# Patient Record
Sex: FEMALE | Race: WHITE | Employment: PART TIME | URBAN - METROPOLITAN AREA
[De-identification: names, ages, dates, MRNs, and addresses within clinical notes are randomized per-mention and may not be internally consistent; named-entity substitution may affect disease eponyms.]

---

## 2017-01-05 ENCOUNTER — OFFICE VISIT (OUTPATIENT)
Dept: FAMILY MEDICINE CLINIC | Age: 62
End: 2017-01-05

## 2017-01-05 VITALS
RESPIRATION RATE: 18 BRPM | HEART RATE: 71 BPM | SYSTOLIC BLOOD PRESSURE: 136 MMHG | TEMPERATURE: 98.2 F | BODY MASS INDEX: 43.41 KG/M2 | WEIGHT: 245 LBS | DIASTOLIC BLOOD PRESSURE: 82 MMHG | OXYGEN SATURATION: 97 % | HEIGHT: 63 IN

## 2017-01-05 DIAGNOSIS — J01.00 ACUTE NON-RECURRENT MAXILLARY SINUSITIS: Primary | ICD-10-CM

## 2017-01-05 DIAGNOSIS — M77.12 LATERAL EPICONDYLITIS OF LEFT ELBOW: ICD-10-CM

## 2017-01-05 PROCEDURE — 20605 DRAIN/INJ JOINT/BURSA W/O US: CPT | Performed by: FAMILY MEDICINE

## 2017-01-05 PROCEDURE — 99213 OFFICE O/P EST LOW 20 MIN: CPT | Performed by: FAMILY MEDICINE

## 2017-01-05 RX ORDER — METHYLPREDNISOLONE ACETATE 40 MG/ML
40 INJECTION, SUSPENSION INTRA-ARTICULAR; INTRALESIONAL; INTRAMUSCULAR; SOFT TISSUE ONCE
Status: COMPLETED | OUTPATIENT
Start: 2017-01-05 | End: 2017-01-05

## 2017-01-05 RX ORDER — CEFDINIR 300 MG/1
300 CAPSULE ORAL 2 TIMES DAILY
Qty: 20 CAPSULE | Refills: 0 | Status: SHIPPED | OUTPATIENT
Start: 2017-01-05 | End: 2017-01-15

## 2017-01-05 RX ADMIN — METHYLPREDNISOLONE ACETATE 40 MG: 40 INJECTION, SUSPENSION INTRA-ARTICULAR; INTRALESIONAL; INTRAMUSCULAR; SOFT TISSUE at 16:15

## 2017-01-05 ASSESSMENT — ENCOUNTER SYMPTOMS
GASTROINTESTINAL NEGATIVE: 1
RESPIRATORY NEGATIVE: 1
CHEST TIGHTNESS: 0
RHINORRHEA: 0
EYES NEGATIVE: 1
COUGH: 0

## 2017-01-16 DIAGNOSIS — N32.81 OVERACTIVE BLADDER: ICD-10-CM

## 2017-01-16 RX ORDER — SOLIFENACIN SUCCINATE 10 MG/1
TABLET, FILM COATED ORAL
Qty: 30 TABLET | Refills: 0 | Status: SHIPPED | OUTPATIENT
Start: 2017-01-16 | End: 2018-04-11 | Stop reason: SDUPTHER

## 2017-02-08 ENCOUNTER — OFFICE VISIT (OUTPATIENT)
Dept: FAMILY MEDICINE CLINIC | Age: 62
End: 2017-02-08

## 2017-02-08 VITALS
DIASTOLIC BLOOD PRESSURE: 80 MMHG | SYSTOLIC BLOOD PRESSURE: 132 MMHG | HEART RATE: 66 BPM | WEIGHT: 247 LBS | OXYGEN SATURATION: 97 % | HEIGHT: 63 IN | BODY MASS INDEX: 43.77 KG/M2 | RESPIRATION RATE: 16 BRPM | TEMPERATURE: 97.6 F

## 2017-02-08 DIAGNOSIS — J01.00 ACUTE NON-RECURRENT MAXILLARY SINUSITIS: Primary | ICD-10-CM

## 2017-02-08 PROCEDURE — 3017F COLORECTAL CA SCREEN DOC REV: CPT | Performed by: FAMILY MEDICINE

## 2017-02-08 PROCEDURE — 1036F TOBACCO NON-USER: CPT | Performed by: FAMILY MEDICINE

## 2017-02-08 PROCEDURE — G8419 CALC BMI OUT NRM PARAM NOF/U: HCPCS | Performed by: FAMILY MEDICINE

## 2017-02-08 PROCEDURE — G8427 DOCREV CUR MEDS BY ELIG CLIN: HCPCS | Performed by: FAMILY MEDICINE

## 2017-02-08 PROCEDURE — 3014F SCREEN MAMMO DOC REV: CPT | Performed by: FAMILY MEDICINE

## 2017-02-08 PROCEDURE — 99213 OFFICE O/P EST LOW 20 MIN: CPT | Performed by: FAMILY MEDICINE

## 2017-02-08 PROCEDURE — G8484 FLU IMMUNIZE NO ADMIN: HCPCS | Performed by: FAMILY MEDICINE

## 2017-02-08 RX ORDER — LEVOFLOXACIN 500 MG/1
500 TABLET, FILM COATED ORAL DAILY
Qty: 14 TABLET | Refills: 0 | Status: SHIPPED | OUTPATIENT
Start: 2017-02-08 | End: 2017-02-22

## 2017-02-08 ASSESSMENT — ENCOUNTER SYMPTOMS
EYES NEGATIVE: 1
SINUS PRESSURE: 1
CHEST TIGHTNESS: 0
RHINORRHEA: 1
SORE THROAT: 1
GASTROINTESTINAL NEGATIVE: 1
COUGH: 1

## 2017-03-15 RX ORDER — TRAZODONE HYDROCHLORIDE 100 MG/1
100 TABLET ORAL NIGHTLY
Qty: 30 TABLET | Refills: 3 | Status: SHIPPED | OUTPATIENT
Start: 2017-03-15 | End: 2017-09-05 | Stop reason: SDUPTHER

## 2017-04-19 RX ORDER — ESCITALOPRAM OXALATE 20 MG/1
TABLET ORAL
Qty: 30 TABLET | Refills: 2 | Status: SHIPPED | OUTPATIENT
Start: 2017-04-19 | End: 2017-04-27

## 2017-04-25 ENCOUNTER — TELEPHONE (OUTPATIENT)
Dept: FAMILY MEDICINE CLINIC | Age: 62
End: 2017-04-25

## 2017-04-26 ENCOUNTER — TELEPHONE (OUTPATIENT)
Dept: FAMILY MEDICINE CLINIC | Age: 62
End: 2017-04-26

## 2017-04-27 RX ORDER — ESCITALOPRAM OXALATE 20 MG/1
20 TABLET ORAL DAILY
Qty: 30 TABLET | Refills: 0 | Status: SHIPPED | OUTPATIENT
Start: 2017-04-27 | End: 2017-06-27 | Stop reason: SDUPTHER

## 2017-04-27 RX ORDER — ESCITALOPRAM OXALATE 10 MG/1
10 TABLET ORAL DAILY
Qty: 30 TABLET | Refills: 3 | Status: SHIPPED | OUTPATIENT
Start: 2017-04-27 | End: 2017-06-27 | Stop reason: DRUGHIGH

## 2017-05-08 ENCOUNTER — OFFICE VISIT (OUTPATIENT)
Dept: FAMILY MEDICINE CLINIC | Age: 62
End: 2017-05-08

## 2017-05-08 VITALS
DIASTOLIC BLOOD PRESSURE: 78 MMHG | TEMPERATURE: 98.1 F | RESPIRATION RATE: 18 BRPM | HEIGHT: 63 IN | BODY MASS INDEX: 43.59 KG/M2 | OXYGEN SATURATION: 98 % | SYSTOLIC BLOOD PRESSURE: 132 MMHG | WEIGHT: 246 LBS | HEART RATE: 76 BPM

## 2017-05-08 DIAGNOSIS — M77.12 LATERAL EPICONDYLITIS OF LEFT ELBOW: Primary | ICD-10-CM

## 2017-05-08 PROCEDURE — 3014F SCREEN MAMMO DOC REV: CPT | Performed by: FAMILY MEDICINE

## 2017-05-08 PROCEDURE — 20605 DRAIN/INJ JOINT/BURSA W/O US: CPT | Performed by: FAMILY MEDICINE

## 2017-05-08 PROCEDURE — 3017F COLORECTAL CA SCREEN DOC REV: CPT | Performed by: FAMILY MEDICINE

## 2017-05-08 PROCEDURE — G8427 DOCREV CUR MEDS BY ELIG CLIN: HCPCS | Performed by: FAMILY MEDICINE

## 2017-05-08 PROCEDURE — 99213 OFFICE O/P EST LOW 20 MIN: CPT | Performed by: FAMILY MEDICINE

## 2017-05-08 PROCEDURE — 1036F TOBACCO NON-USER: CPT | Performed by: FAMILY MEDICINE

## 2017-05-08 PROCEDURE — G8417 CALC BMI ABV UP PARAM F/U: HCPCS | Performed by: FAMILY MEDICINE

## 2017-05-08 RX ORDER — METHYLPREDNISOLONE ACETATE 40 MG/ML
40 INJECTION, SUSPENSION INTRA-ARTICULAR; INTRALESIONAL; INTRAMUSCULAR; SOFT TISSUE ONCE
Status: COMPLETED | OUTPATIENT
Start: 2017-05-08 | End: 2017-05-08

## 2017-05-08 RX ADMIN — METHYLPREDNISOLONE ACETATE 40 MG: 40 INJECTION, SUSPENSION INTRA-ARTICULAR; INTRALESIONAL; INTRAMUSCULAR; SOFT TISSUE at 17:04

## 2017-05-08 ASSESSMENT — ENCOUNTER SYMPTOMS
EYES NEGATIVE: 1
RHINORRHEA: 0
COUGH: 0
RESPIRATORY NEGATIVE: 1
CHEST TIGHTNESS: 0
GASTROINTESTINAL NEGATIVE: 1

## 2017-06-27 RX ORDER — ESCITALOPRAM OXALATE 20 MG/1
20 TABLET ORAL DAILY
Qty: 30 TABLET | Refills: 3 | Status: SHIPPED | OUTPATIENT
Start: 2017-06-27 | End: 2017-12-20 | Stop reason: SDUPTHER

## 2017-08-22 DIAGNOSIS — R30.9 PAIN WITH URINATION: ICD-10-CM

## 2017-08-24 LAB
ORGANISM: ABNORMAL
URINE CULTURE, ROUTINE: ABNORMAL

## 2017-09-05 RX ORDER — TRAZODONE HYDROCHLORIDE 100 MG/1
100 TABLET ORAL NIGHTLY
Qty: 30 TABLET | Refills: 3 | Status: SHIPPED | OUTPATIENT
Start: 2017-09-05 | End: 2018-12-03 | Stop reason: SDUPTHER

## 2017-12-21 RX ORDER — ESCITALOPRAM OXALATE 20 MG/1
TABLET ORAL
Qty: 30 TABLET | Refills: 2 | Status: SHIPPED | OUTPATIENT
Start: 2017-12-21 | End: 2018-06-04 | Stop reason: SDUPTHER

## 2018-04-11 DIAGNOSIS — N32.81 OVERACTIVE BLADDER: ICD-10-CM

## 2018-04-11 RX ORDER — SOLIFENACIN SUCCINATE 10 MG/1
TABLET, FILM COATED ORAL
Qty: 30 TABLET | Refills: 0 | Status: SHIPPED | OUTPATIENT
Start: 2018-04-11 | End: 2019-11-05 | Stop reason: SDUPTHER

## 2018-08-22 RX ORDER — ESCITALOPRAM OXALATE 20 MG/1
TABLET ORAL
Qty: 30 TABLET | Refills: 1 | Status: SHIPPED | OUTPATIENT
Start: 2018-08-22 | End: 2018-12-03 | Stop reason: SDUPTHER

## 2018-11-26 ENCOUNTER — OFFICE VISIT (OUTPATIENT)
Dept: FAMILY MEDICINE CLINIC | Age: 63
End: 2018-11-26
Payer: COMMERCIAL

## 2018-11-26 VITALS
WEIGHT: 231 LBS | TEMPERATURE: 96.9 F | HEART RATE: 59 BPM | HEIGHT: 63 IN | BODY MASS INDEX: 40.93 KG/M2 | DIASTOLIC BLOOD PRESSURE: 72 MMHG | SYSTOLIC BLOOD PRESSURE: 112 MMHG | OXYGEN SATURATION: 96 %

## 2018-11-26 DIAGNOSIS — R51.9 ACUTE NONINTRACTABLE HEADACHE, UNSPECIFIED HEADACHE TYPE: ICD-10-CM

## 2018-11-26 DIAGNOSIS — J01.40 ACUTE NON-RECURRENT PANSINUSITIS: Primary | ICD-10-CM

## 2018-11-26 PROCEDURE — G8484 FLU IMMUNIZE NO ADMIN: HCPCS | Performed by: NURSE PRACTITIONER

## 2018-11-26 PROCEDURE — G8417 CALC BMI ABV UP PARAM F/U: HCPCS | Performed by: NURSE PRACTITIONER

## 2018-11-26 PROCEDURE — 99213 OFFICE O/P EST LOW 20 MIN: CPT | Performed by: NURSE PRACTITIONER

## 2018-11-26 PROCEDURE — 3017F COLORECTAL CA SCREEN DOC REV: CPT | Performed by: NURSE PRACTITIONER

## 2018-11-26 PROCEDURE — 1036F TOBACCO NON-USER: CPT | Performed by: NURSE PRACTITIONER

## 2018-11-26 PROCEDURE — G8427 DOCREV CUR MEDS BY ELIG CLIN: HCPCS | Performed by: NURSE PRACTITIONER

## 2018-11-26 PROCEDURE — 96372 THER/PROPH/DIAG INJ SC/IM: CPT | Performed by: NURSE PRACTITIONER

## 2018-11-26 RX ORDER — KETOROLAC TROMETHAMINE 30 MG/ML
60 INJECTION, SOLUTION INTRAMUSCULAR; INTRAVENOUS ONCE
Status: COMPLETED | OUTPATIENT
Start: 2018-11-26 | End: 2018-11-26

## 2018-11-26 RX ORDER — CEFDINIR 300 MG/1
600 CAPSULE ORAL DAILY
Qty: 20 CAPSULE | Refills: 0 | Status: SHIPPED | OUTPATIENT
Start: 2018-11-26 | End: 2018-12-06

## 2018-11-26 RX ADMIN — KETOROLAC TROMETHAMINE 60 MG: 30 INJECTION, SOLUTION INTRAMUSCULAR; INTRAVENOUS at 16:18

## 2018-11-26 ASSESSMENT — ENCOUNTER SYMPTOMS
PHOTOPHOBIA: 0
EYE REDNESS: 0
RHINORRHEA: 1
EYE ITCHING: 0
CHEST TIGHTNESS: 0
DIARRHEA: 0
COUGH: 1
EYE PAIN: 0
NAUSEA: 0
VOMITING: 0
STRIDOR: 0
WHEEZING: 0
TROUBLE SWALLOWING: 0
FACIAL SWELLING: 0
SORE THROAT: 0
SINUS PAIN: 1
SINUS PRESSURE: 1
SHORTNESS OF BREATH: 0
CHOKING: 0
EYE DISCHARGE: 0
HOARSE VOICE: 0
SWOLLEN GLANDS: 0

## 2018-11-26 ASSESSMENT — PATIENT HEALTH QUESTIONNAIRE - PHQ9
1. LITTLE INTEREST OR PLEASURE IN DOING THINGS: 0
SUM OF ALL RESPONSES TO PHQ9 QUESTIONS 1 & 2: 0
SUM OF ALL RESPONSES TO PHQ QUESTIONS 1-9: 0
2. FEELING DOWN, DEPRESSED OR HOPELESS: 0
SUM OF ALL RESPONSES TO PHQ QUESTIONS 1-9: 0

## 2018-11-26 NOTE — PROGRESS NOTES
Expresses understanding and desires to proceed with treatment plan. Discussed signs and symptoms which require immediate follow-up in ED/call to 911. Understanding verbalized. I have reviewed and updated the electronic medical record.     VANESA Fatima NP

## 2018-12-03 ENCOUNTER — OFFICE VISIT (OUTPATIENT)
Dept: FAMILY MEDICINE CLINIC | Age: 63
End: 2018-12-03
Payer: COMMERCIAL

## 2018-12-03 VITALS
RESPIRATION RATE: 15 BRPM | HEIGHT: 63 IN | SYSTOLIC BLOOD PRESSURE: 116 MMHG | HEART RATE: 65 BPM | TEMPERATURE: 97 F | DIASTOLIC BLOOD PRESSURE: 76 MMHG | WEIGHT: 227 LBS | OXYGEN SATURATION: 97 % | BODY MASS INDEX: 40.22 KG/M2

## 2018-12-03 DIAGNOSIS — R07.9 CHEST PAIN, UNSPECIFIED TYPE: Primary | ICD-10-CM

## 2018-12-03 DIAGNOSIS — J40 BRONCHITIS: ICD-10-CM

## 2018-12-03 DIAGNOSIS — R06.02 SHORTNESS OF BREATH: ICD-10-CM

## 2018-12-03 PROCEDURE — G8484 FLU IMMUNIZE NO ADMIN: HCPCS | Performed by: INTERNAL MEDICINE

## 2018-12-03 PROCEDURE — 99215 OFFICE O/P EST HI 40 MIN: CPT | Performed by: INTERNAL MEDICINE

## 2018-12-03 PROCEDURE — G8427 DOCREV CUR MEDS BY ELIG CLIN: HCPCS | Performed by: INTERNAL MEDICINE

## 2018-12-03 PROCEDURE — G8417 CALC BMI ABV UP PARAM F/U: HCPCS | Performed by: INTERNAL MEDICINE

## 2018-12-03 PROCEDURE — 93000 ELECTROCARDIOGRAM COMPLETE: CPT | Performed by: INTERNAL MEDICINE

## 2018-12-03 PROCEDURE — 1036F TOBACCO NON-USER: CPT | Performed by: INTERNAL MEDICINE

## 2018-12-03 PROCEDURE — 3017F COLORECTAL CA SCREEN DOC REV: CPT | Performed by: INTERNAL MEDICINE

## 2018-12-03 RX ORDER — TRAZODONE HYDROCHLORIDE 100 MG/1
100 TABLET ORAL NIGHTLY
Qty: 30 TABLET | Refills: 3 | Status: CANCELLED | OUTPATIENT
Start: 2018-12-03

## 2018-12-03 RX ORDER — TRAZODONE HYDROCHLORIDE 100 MG/1
100 TABLET ORAL NIGHTLY
Qty: 30 TABLET | Refills: 0 | Status: SHIPPED | OUTPATIENT
Start: 2018-12-03 | End: 2019-02-26 | Stop reason: SDUPTHER

## 2018-12-03 RX ORDER — ESCITALOPRAM OXALATE 20 MG/1
TABLET ORAL
Qty: 30 TABLET | Refills: 1 | Status: CANCELLED | OUTPATIENT
Start: 2018-12-03

## 2018-12-03 RX ORDER — BENZONATATE 100 MG/1
100 CAPSULE ORAL 3 TIMES DAILY PRN
Qty: 21 CAPSULE | Refills: 0 | Status: SHIPPED | OUTPATIENT
Start: 2018-12-03 | End: 2018-12-10

## 2018-12-03 RX ORDER — PREDNISONE 10 MG/1
TABLET ORAL
Qty: 21 TABLET | Refills: 0 | Status: SHIPPED | OUTPATIENT
Start: 2018-12-03 | End: 2020-02-20 | Stop reason: CLARIF

## 2018-12-03 RX ORDER — MELOXICAM 15 MG/1
15 TABLET ORAL DAILY
Refills: 1 | COMMUNITY
Start: 2018-11-23 | End: 2020-09-06 | Stop reason: SDUPTHER

## 2018-12-03 RX ORDER — AZITHROMYCIN 250 MG/1
TABLET, FILM COATED ORAL
Qty: 1 PACKET | Refills: 0 | Status: SHIPPED | OUTPATIENT
Start: 2018-12-03 | End: 2020-02-20 | Stop reason: CLARIF

## 2018-12-03 RX ORDER — ESCITALOPRAM OXALATE 20 MG/1
TABLET ORAL
Qty: 30 TABLET | Refills: 0 | Status: SHIPPED | OUTPATIENT
Start: 2018-12-03 | End: 2018-12-26 | Stop reason: SDUPTHER

## 2018-12-03 ASSESSMENT — ENCOUNTER SYMPTOMS
BACK PAIN: 0
COUGH: 1
ABDOMINAL PAIN: 0
SHORTNESS OF BREATH: 0
EYE PAIN: 0

## 2018-12-03 NOTE — PROGRESS NOTES
Encounter   Procedures    XR CHEST STANDARD (2 VW)     Standing Status:   Future     Standing Expiration Date:   12/3/2019     Order Specific Question:   Reason for exam:     Answer:   chest pain and shortness of breath    CBC with Differential     Standing Status:   Future     Standing Expiration Date:   12/3/2019    Comprehensive Metabolic Panel     Standing Status:   Future     Standing Expiration Date:   12/3/2019    (Gxt) Stress Test Exercise W Out Myoview     Standing Status:   Future     Standing Expiration Date:   12/3/2019     Order Specific Question:   Reason for Exam?     Answer:   Chest pain    EKG 12 lead     Order Specific Question:   Reason for Exam?     Answer:   Chest pain     Orders Placed This Encounter   Medications    predniSONE (DELTASONE) 10 MG tablet     Sig: Take 4 tabs po qd x 2 days, then 3 tabs po qd x 2 days, then 2 tabs po qd x 2 days, then 1 tab po qd x 2 days, then 1/2 tab po qd x 2 days. Dispense:  21 tablet     Refill:  0    azithromycin (ZITHROMAX) 250 MG tablet     Sig: Take 2 tabs (500 mg) on Day 1, and take 1 tab (250 mg) on days 2 through 5. Dispense:  1 packet     Refill:  0    benzonatate (TESSALON) 100 MG capsule     Sig: Take 1 capsule by mouth 3 times daily as needed for Cough     Dispense:  21 capsule     Refill:  0       Return for regularly scheduled appointment with PCP, worsening symptoms, call ASAP for appointment.       Ryan Whittington MD

## 2018-12-04 ENCOUNTER — TELEPHONE (OUTPATIENT)
Dept: FAMILY MEDICINE CLINIC | Age: 63
End: 2018-12-04

## 2018-12-04 DIAGNOSIS — J18.9 PNEUMONIA OF RIGHT LOWER LOBE DUE TO INFECTIOUS ORGANISM: Primary | ICD-10-CM

## 2018-12-04 RX ORDER — AMOXICILLIN 875 MG/1
875 TABLET, COATED ORAL 2 TIMES DAILY
Qty: 14 TABLET | Refills: 0 | Status: SHIPPED | OUTPATIENT
Start: 2018-12-04 | End: 2018-12-11

## 2018-12-26 RX ORDER — ESCITALOPRAM OXALATE 20 MG/1
TABLET ORAL
Qty: 30 TABLET | Refills: 3 | Status: SHIPPED | OUTPATIENT
Start: 2018-12-26 | End: 2019-05-07 | Stop reason: SDUPTHER

## 2019-02-26 RX ORDER — TRAZODONE HYDROCHLORIDE 100 MG/1
TABLET ORAL
Qty: 30 TABLET | Refills: 0 | Status: SHIPPED | OUTPATIENT
Start: 2019-02-26 | End: 2020-12-14 | Stop reason: SDUPTHER

## 2019-05-07 RX ORDER — ESCITALOPRAM OXALATE 20 MG/1
TABLET ORAL
Qty: 30 TABLET | Refills: 2 | Status: SHIPPED | OUTPATIENT
Start: 2019-05-07 | End: 2019-08-12 | Stop reason: SDUPTHER

## 2019-08-12 RX ORDER — ESCITALOPRAM OXALATE 20 MG/1
TABLET ORAL
Qty: 30 TABLET | Refills: 1 | Status: SHIPPED | OUTPATIENT
Start: 2019-08-12 | End: 2019-10-12 | Stop reason: SDUPTHER

## 2019-10-14 RX ORDER — ESCITALOPRAM OXALATE 20 MG/1
TABLET ORAL
Qty: 30 TABLET | Refills: 0 | Status: SHIPPED | OUTPATIENT
Start: 2019-10-14 | End: 2019-12-23

## 2019-11-05 ENCOUNTER — OFFICE VISIT (OUTPATIENT)
Dept: FAMILY MEDICINE CLINIC | Age: 64
End: 2019-11-05
Payer: COMMERCIAL

## 2019-11-05 VITALS
RESPIRATION RATE: 13 BRPM | HEIGHT: 63 IN | SYSTOLIC BLOOD PRESSURE: 122 MMHG | DIASTOLIC BLOOD PRESSURE: 70 MMHG | HEART RATE: 85 BPM | BODY MASS INDEX: 40.21 KG/M2 | OXYGEN SATURATION: 98 % | TEMPERATURE: 98.1 F

## 2019-11-05 DIAGNOSIS — L24.89 IRRITANT CONTACT DERMATITIS DUE TO OTHER AGENTS: Primary | ICD-10-CM

## 2019-11-05 DIAGNOSIS — N32.81 OVERACTIVE BLADDER: ICD-10-CM

## 2019-11-05 PROCEDURE — 1036F TOBACCO NON-USER: CPT | Performed by: INTERNAL MEDICINE

## 2019-11-05 PROCEDURE — G8417 CALC BMI ABV UP PARAM F/U: HCPCS | Performed by: INTERNAL MEDICINE

## 2019-11-05 PROCEDURE — G8484 FLU IMMUNIZE NO ADMIN: HCPCS | Performed by: INTERNAL MEDICINE

## 2019-11-05 PROCEDURE — 3017F COLORECTAL CA SCREEN DOC REV: CPT | Performed by: INTERNAL MEDICINE

## 2019-11-05 PROCEDURE — G8427 DOCREV CUR MEDS BY ELIG CLIN: HCPCS | Performed by: INTERNAL MEDICINE

## 2019-11-05 PROCEDURE — 99213 OFFICE O/P EST LOW 20 MIN: CPT | Performed by: INTERNAL MEDICINE

## 2019-11-05 RX ORDER — SOLIFENACIN SUCCINATE 10 MG/1
TABLET, FILM COATED ORAL
Qty: 30 TABLET | Refills: 5 | Status: SHIPPED | OUTPATIENT
Start: 2019-11-05 | End: 2020-07-23 | Stop reason: SDUPTHER

## 2019-11-05 ASSESSMENT — ENCOUNTER SYMPTOMS
FACIAL SWELLING: 0
ABDOMINAL DISTENTION: 0
PHOTOPHOBIA: 0
SINUS PRESSURE: 0
VOICE CHANGE: 0
DIARRHEA: 0
RECTAL PAIN: 0
RHINORRHEA: 0
EYE PAIN: 0
TROUBLE SWALLOWING: 0
BACK PAIN: 0
ABDOMINAL PAIN: 0
COUGH: 0
EYE REDNESS: 0
WHEEZING: 0
NAUSEA: 0
SORE THROAT: 0
EYE ITCHING: 0
VOMITING: 0
CONSTIPATION: 0
APNEA: 0
COLOR CHANGE: 0
BLOOD IN STOOL: 0
SHORTNESS OF BREATH: 0
EYE DISCHARGE: 0
CHEST TIGHTNESS: 0
SINUS PAIN: 0

## 2019-12-23 RX ORDER — ESCITALOPRAM OXALATE 20 MG/1
TABLET ORAL
Qty: 30 TABLET | Refills: 0 | Status: SHIPPED | OUTPATIENT
Start: 2019-12-23 | End: 2020-02-20 | Stop reason: SDUPTHER

## 2020-02-20 ENCOUNTER — OFFICE VISIT (OUTPATIENT)
Dept: FAMILY MEDICINE CLINIC | Age: 65
End: 2020-02-20
Payer: COMMERCIAL

## 2020-02-20 VITALS
WEIGHT: 243 LBS | SYSTOLIC BLOOD PRESSURE: 130 MMHG | HEIGHT: 63 IN | DIASTOLIC BLOOD PRESSURE: 80 MMHG | HEART RATE: 80 BPM | RESPIRATION RATE: 14 BRPM | BODY MASS INDEX: 43.05 KG/M2

## 2020-02-20 DIAGNOSIS — E78.00 PURE HYPERCHOLESTEROLEMIA: ICD-10-CM

## 2020-02-20 DIAGNOSIS — R53.82 CHRONIC FATIGUE: ICD-10-CM

## 2020-02-20 LAB
ALBUMIN SERPL-MCNC: 5 G/DL (ref 3.5–4.6)
ALP BLD-CCNC: 103 U/L (ref 40–130)
ALT SERPL-CCNC: 35 U/L (ref 0–33)
ANION GAP SERPL CALCULATED.3IONS-SCNC: 12 MEQ/L (ref 9–15)
AST SERPL-CCNC: 30 U/L (ref 0–35)
BASOPHILS ABSOLUTE: 0 K/UL (ref 0–0.2)
BASOPHILS RELATIVE PERCENT: 0.6 %
BILIRUB SERPL-MCNC: 0.6 MG/DL (ref 0.2–0.7)
BUN BLDV-MCNC: 21 MG/DL (ref 8–23)
CALCIUM SERPL-MCNC: 10.4 MG/DL (ref 8.5–9.9)
CHLORIDE BLD-SCNC: 99 MEQ/L (ref 95–107)
CHOLESTEROL, TOTAL: 228 MG/DL (ref 0–199)
CO2: 27 MEQ/L (ref 20–31)
CREAT SERPL-MCNC: 1 MG/DL (ref 0.5–0.9)
EOSINOPHILS ABSOLUTE: 0.5 K/UL (ref 0–0.7)
EOSINOPHILS RELATIVE PERCENT: 7.6 %
GFR AFRICAN AMERICAN: >60
GFR NON-AFRICAN AMERICAN: 55.9
GLOBULIN: 3.7 G/DL (ref 2.3–3.5)
GLUCOSE BLD-MCNC: 97 MG/DL (ref 70–99)
HCT VFR BLD CALC: 45.8 % (ref 37–47)
HDLC SERPL-MCNC: 58 MG/DL (ref 40–59)
HEMOGLOBIN: 15 G/DL (ref 12–16)
LDL CHOLESTEROL CALCULATED: 148 MG/DL (ref 0–129)
LYMPHOCYTES ABSOLUTE: 1.9 K/UL (ref 1–4.8)
LYMPHOCYTES RELATIVE PERCENT: 28.2 %
MCH RBC QN AUTO: 29.2 PG (ref 27–31.3)
MCHC RBC AUTO-ENTMCNC: 32.7 % (ref 33–37)
MCV RBC AUTO: 89.4 FL (ref 82–100)
MONOCYTES ABSOLUTE: 0.6 K/UL (ref 0.2–0.8)
MONOCYTES RELATIVE PERCENT: 8.1 %
NEUTROPHILS ABSOLUTE: 3.8 K/UL (ref 1.4–6.5)
NEUTROPHILS RELATIVE PERCENT: 55.5 %
PDW BLD-RTO: 14.5 % (ref 11.5–14.5)
PLATELET # BLD: 248 K/UL (ref 130–400)
POTASSIUM SERPL-SCNC: 4.9 MEQ/L (ref 3.4–4.9)
RBC # BLD: 5.12 M/UL (ref 4.2–5.4)
SODIUM BLD-SCNC: 138 MEQ/L (ref 135–144)
T4 FREE: 1.08 NG/DL (ref 0.84–1.68)
TOTAL PROTEIN: 8.7 G/DL (ref 6.3–8)
TRIGL SERPL-MCNC: 108 MG/DL (ref 0–150)
TSH SERPL DL<=0.05 MIU/L-ACNC: 1.5 UIU/ML (ref 0.44–3.86)
VITAMIN B-12: 291 PG/ML (ref 232–1245)
VITAMIN D 25-HYDROXY: 22.3 NG/ML (ref 30–100)
WBC # BLD: 6.8 K/UL (ref 4.8–10.8)

## 2020-02-20 PROCEDURE — 3017F COLORECTAL CA SCREEN DOC REV: CPT | Performed by: NURSE PRACTITIONER

## 2020-02-20 PROCEDURE — 1036F TOBACCO NON-USER: CPT | Performed by: NURSE PRACTITIONER

## 2020-02-20 PROCEDURE — G8417 CALC BMI ABV UP PARAM F/U: HCPCS | Performed by: NURSE PRACTITIONER

## 2020-02-20 PROCEDURE — G8484 FLU IMMUNIZE NO ADMIN: HCPCS | Performed by: NURSE PRACTITIONER

## 2020-02-20 PROCEDURE — 99214 OFFICE O/P EST MOD 30 MIN: CPT | Performed by: NURSE PRACTITIONER

## 2020-02-20 PROCEDURE — G8427 DOCREV CUR MEDS BY ELIG CLIN: HCPCS | Performed by: NURSE PRACTITIONER

## 2020-02-20 RX ORDER — ALBUTEROL SULFATE 90 UG/1
2 AEROSOL, METERED RESPIRATORY (INHALATION) EVERY 6 HOURS PRN
Qty: 1 INHALER | Refills: 1 | Status: CANCELLED | OUTPATIENT
Start: 2020-02-20

## 2020-02-20 RX ORDER — ESCITALOPRAM OXALATE 20 MG/1
TABLET ORAL
Qty: 30 TABLET | Refills: 11 | Status: SHIPPED | OUTPATIENT
Start: 2020-02-20 | End: 2021-03-02

## 2020-02-24 ASSESSMENT — ENCOUNTER SYMPTOMS
BACK PAIN: 0
COUGH: 1
EYE PAIN: 0
SHORTNESS OF BREATH: 0
ABDOMINAL PAIN: 0

## 2020-02-24 NOTE — PROGRESS NOTES
Days per week: Not on file     Minutes per session: Not on file    Stress: Not on file   Relationships    Social connections:     Talks on phone: Not on file     Gets together: Not on file     Attends Bahai service: Not on file     Active member of club or organization: Not on file     Attends meetings of clubs or organizations: Not on file     Relationship status: Not on file    Intimate partner violence:     Fear of current or ex partner: Not on file     Emotionally abused: Not on file     Physically abused: Not on file     Forced sexual activity: Not on file   Other Topics Concern    Not on file   Social History Narrative    Not on file     Allergies   Allergen Reactions    Augmentin [Amoxicillin-Pot Clavulanate] Diarrhea     Current Outpatient Medications on File Prior to Visit   Medication Sig Dispense Refill    solifenacin (VESICARE) 10 MG tablet TAKE ONE TABLET BY MOUTH EVERY DAY 30 tablet 5    traZODone (DESYREL) 100 MG tablet TAKE ONE TABLET BY MOUTH NIGHTLY 30 tablet 0    meloxicam (MOBIC) 15 MG tablet Take 15 mg by mouth daily  1    albuterol sulfate HFA (PROAIR HFA) 108 (90 BASE) MCG/ACT inhaler Inhale 2 puffs into the lungs every 6 hours as needed for Wheezing (Patient taking differently: Inhale 2 puffs into the lungs every 6 hours as needed for Wheezing) 1 Inhaler 1    hydrochlorothiazide (HYDRODIURIL) 25 MG tablet Take 1 tablet by mouth 2 times daily 60 tablet 11    valACYclovir (VALTREX) 500 MG tablet Take 1 tablet by mouth 2 times daily as needed 20 tablet 0     No current facility-administered medications on file prior to visit. I have personally reviewed the ROS, PMH, PFH, and social history     Review of Systems   Constitutional: Positive for fatigue. Negative for chills and fever. HENT: Negative for congestion. Eyes: Negative for pain. Respiratory: Positive for cough. Negative for shortness of breath. Cardiovascular: Positive for chest pain. Gastrointestinal: Negative for abdominal pain. Genitourinary: Negative for hematuria. Musculoskeletal: Negative for back pain. Allergic/Immunologic: Negative for immunocompromised state. Neurological: Negative for headaches. Psychiatric/Behavioral: Negative for hallucinations. Objective:   /80   Pulse 80   Resp 14   Ht 5' 3\" (1.6 m)   Wt 243 lb (110.2 kg)   BMI 43.05 kg/m²     Physical Exam  Constitutional:       Appearance: She is well-developed. HENT:      Head: Normocephalic. Eyes:      Pupils: Pupils are equal, round, and reactive to light. Neck:      Trachea: No tracheal deviation. Cardiovascular:      Rate and Rhythm: Normal rate and regular rhythm. Heart sounds: Normal heart sounds. No murmur. No friction rub. No gallop. Pulmonary:      Effort: No respiratory distress. Abdominal:      General: Bowel sounds are normal. There is no distension. Palpations: Abdomen is soft. Tenderness: There is no rebound. Musculoskeletal:      Comments: 2+ radial pulse bl    Skin:     General: Skin is warm and dry. Neurological:      Mental Status: She is oriented to person, place, and time. Assessment & Plan   Michael Bell was seen today for established new doctor, check-up, weight gain, arm pain, fatigue and depression. Diagnoses and all orders for this visit:    Intermittent chest pain-  left arm  -     Keanu Lee MD, Cardiology, [place]    Post-menopausal  -     DEXA BONE DENSITY AXIAL SKELETON; Future    Pure hypercholesterolemia  -     Lipid Panel; Future  -     Comprehensive Metabolic Panel; Future  -     CBC With Auto Differential; Future    Fatigue due to depression    Chronic fatigue  -     TSH Without Reflex; Future  -     T4, Free; Future  -     Vitamin D 25 Hydroxy;  Future  -     Vitamin B12; Future    Moderate persistent asthma without complication  -     albuterol sulfate (PROAIR RESPICLICK) 110 (90 Base) MCG/ACT aerosol powder inhalation; Inhale 2 puffs into the lungs every 6 hours as needed for Wheezing or Shortness of Breath  -     mometasone-formoterol (DULERA) 100-5 MCG/ACT inhaler; Inhale 1 puff into the lungs 2 times daily    Encounter for screening mammogram for malignant neoplasm of breast  -     FRANTZ DIGITAL SCREEN W CAD BILATERAL;  Future    Other orders  -     escitalopram (LEXAPRO) 20 MG tablet; TAKE ONE TABLET BY MOUTH EVERY DAY      Orders Placed This Encounter   Procedures    FRANTZ DIGITAL SCREEN W CAD BILATERAL     Standing Status:   Future     Standing Expiration Date:   2/19/2021     Order Specific Question:   Reason for exam:     Answer:   v76.12    DEXA BONE DENSITY AXIAL SKELETON     Standing Status:   Future     Standing Expiration Date:   2/20/2021     Order Specific Question:   Reason for exam:     Answer:   post menopausal    Lipid Panel     Standing Status:   Future     Number of Occurrences:   1     Standing Expiration Date:   2/20/2021     Order Specific Question:   Is Patient Fasting?/# of Hours     Answer:   9    Comprehensive Metabolic Panel     Standing Status:   Future     Number of Occurrences:   1     Standing Expiration Date:   2/20/2021    CBC With Auto Differential     Standing Status:   Future     Number of Occurrences:   1     Standing Expiration Date:   2/20/2021    TSH Without Reflex     Standing Status:   Future     Number of Occurrences:   1     Standing Expiration Date:   2/20/2021    T4, Free     Standing Status:   Future     Number of Occurrences:   1     Standing Expiration Date:   2/20/2021    Vitamin D 25 Hydroxy     Standing Status:   Future     Number of Occurrences:   1     Standing Expiration Date:   2/20/2021    Vitamin B12     Standing Status:   Future     Number of Occurrences:   1     Standing Expiration Date:   2/20/2021   Muna Jackson MD, Cardiology, [place]     Referral Priority:   Routine     Referral Type:   Eval and Treat     Referral Reason: Specialty Services Required     Referred to Provider:   Caryn Schaumann, MD     Requested Specialty:   Cardiology     Number of Visits Requested:   1     Orders Placed This Encounter   Medications    escitalopram (LEXAPRO) 20 MG tablet     Sig: TAKE ONE TABLET BY MOUTH EVERY DAY     Dispense:  30 tablet     Refill:  11    albuterol sulfate (PROAIR RESPICLICK) 415 (90 Base) MCG/ACT aerosol powder inhalation     Sig: Inhale 2 puffs into the lungs every 6 hours as needed for Wheezing or Shortness of Breath     Dispense:  1 Inhaler     Refill:  5    mometasone-formoterol (DULERA) 100-5 MCG/ACT inhaler     Sig: Inhale 1 puff into the lungs 2 times daily     Dispense:  1 Inhaler     Refill:  5     Medications Discontinued During This Encounter   Medication Reason    azithromycin (ZITHROMAX) 250 MG tablet ERROR    predniSONE (DELTASONE) 10 MG tablet ERROR    sodium chloride (OCEAN NASAL SPRAY) 0.65 % nasal spray ERROR    Fluticasone Furoate-Vilanterol (BREO ELLIPTA IN) ERROR    diclofenac sodium 1 % GEL ERROR    hydrocortisone 2.5 % cream LIST CLEANUP    triamcinolone acetonide (KENALOG) 0.1 % paste LIST CLEANUP    penciclovir (DENAVIR) 1 % cream LIST CLEANUP    escitalopram (LEXAPRO) 20 MG tablet REORDER     Return in about 4 weeks (around 3/19/2020). Reviewed with the patient: current clinical status, medications, activities and diet. Side effects, adverse effects of the medication prescribed today, as well as treatment plan/ rationale and result expectations have been discussed with the patient who expresses understanding and desires to proceed. Close follow up to evaluate treatment results and for coordination of care. I have reviewed the patient's medical history in detail and updated the computerized patient record.     Marie Mackenzie, APRN - CNP

## 2020-02-27 ENCOUNTER — HOSPITAL ENCOUNTER (OUTPATIENT)
Dept: WOMENS IMAGING | Age: 65
Discharge: HOME | End: 2020-02-29
Payer: COMMERCIAL

## 2020-02-27 PROCEDURE — 77067 SCR MAMMO BI INCL CAD: CPT

## 2020-02-27 PROCEDURE — 77080 DXA BONE DENSITY AXIAL: CPT

## 2020-03-04 ENCOUNTER — TELEPHONE (OUTPATIENT)
Dept: FAMILY MEDICINE CLINIC | Age: 65
End: 2020-03-04

## 2020-03-09 RX ORDER — ERGOCALCIFEROL 1.25 MG/1
50000 CAPSULE ORAL WEEKLY
Qty: 4 CAPSULE | Refills: 11 | Status: SHIPPED | OUTPATIENT
Start: 2020-03-09 | End: 2020-07-23 | Stop reason: SDUPTHER

## 2020-03-09 RX ORDER — ROSUVASTATIN CALCIUM 5 MG/1
5 TABLET, COATED ORAL DAILY
Qty: 30 TABLET | Refills: 11 | Status: SHIPPED | OUTPATIENT
Start: 2020-03-09 | End: 2020-07-23 | Stop reason: SDUPTHER

## 2020-03-09 NOTE — TELEPHONE ENCOUNTER
Please let call and let her know Patrick bedoya,  thank her for calling back, not sure who she talked to    sent Crestor for cholesterol. And orderd blood work she can come in and getting done in 6 weeks   Sent vit d weekly. Her bone density looked great.   Just a little osteopenia on the right thigh bone-  Take calcium 1200mg daily she can buy over the counter

## 2020-03-25 ENCOUNTER — VIRTUAL VISIT (OUTPATIENT)
Dept: CARDIOLOGY CLINIC | Age: 65
End: 2020-03-25
Payer: COMMERCIAL

## 2020-03-25 PROCEDURE — 99244 OFF/OP CNSLTJ NEW/EST MOD 40: CPT | Performed by: INTERNAL MEDICINE

## 2020-03-25 PROCEDURE — G8428 CUR MEDS NOT DOCUMENT: HCPCS | Performed by: INTERNAL MEDICINE

## 2020-03-25 ASSESSMENT — ENCOUNTER SYMPTOMS
ABDOMINAL DISTENTION: 0
CHEST TIGHTNESS: 1
STRIDOR: 0
CHOKING: 0
COLOR CHANGE: 0
ABDOMINAL PAIN: 0
BACK PAIN: 0
SHORTNESS OF BREATH: 0
WHEEZING: 0
COUGH: 0
APNEA: 0

## 2020-03-25 NOTE — PROGRESS NOTES
3/25/2020    TELEHEALTH EVALUATION -- Audio/Visual (During YIRIP-59 public health emergency)    Due to [place] outbreak, patient's office visit was converted to a virtual visit. Patient was contacted and agreed to proceed with a virtual visit via [email or web address]. me  The risks and benefits of converting to a virtual visit were discussed in light of the current infectious disease epidemic. Patient also understood that insurance coverage and co-pays are up to their individual insurance plans. HPI:    Simone Schneider (:  1955) has requested an audio/video evaluation for the following concern(s):    Paitent having palpitations, recently for past month left arm and chest stinging with discomfort. Short of breath but has asthma. /85. No sweats, no nausea. No prior cardiac problems. Dad had pacemaker. Mom had mitral valve prolapse. The patient is complaining of left  chest pain stinging that is instantaneous. radiation to left arm lasts 3 minutes. nonexertional, mild-mod severity, lasting for 1 minutes with no aggravating and no alleviating factors. Associated with no dyspnea. Symptoms have been present for 30 days. Chronic swelling in ankles. Review of Systems   Constitutional: Negative for activity change and appetite change. HENT: Negative for congestion. Respiratory: Positive for chest tightness. Negative for apnea, cough, choking, shortness of breath, wheezing and stridor. Cardiovascular: Positive for chest pain and leg swelling. Negative for palpitations. Gastrointestinal: Negative for abdominal distention and abdominal pain. Endocrine: Negative for cold intolerance and heat intolerance. Genitourinary: Negative for dysuria and enuresis. Musculoskeletal: Negative for arthralgias and back pain. Skin: Negative for color change. Neurological: Negative for dizziness, seizures, syncope and light-headedness.    Psychiatric/Behavioral: Negative for agitation, behavioral problems and confusion. Prior to Visit Medications    Medication Sig Taking?  Authorizing Provider   rosuvastatin (CRESTOR) 5 MG tablet Take 1 tablet by mouth daily  VANESA Mendes CNP   vitamin D (ERGOCALCIFEROL) 1.25 MG (74743 UT) CAPS capsule Take 1 capsule by mouth once a week  VANESA Mercer CNP   escitalopram (LEXAPRO) 20 MG tablet TAKE ONE TABLET BY MOUTH EVERY DAY  VANESA Mercer CNP   albuterol sulfate (PROAIR RESPICLICK) 039 (90 Base) MCG/ACT aerosol powder inhalation Inhale 2 puffs into the lungs every 6 hours as needed for Wheezing or Shortness of Breath  VANESA Mercer CNP   mometasone-formoterol (DULERA) 100-5 MCG/ACT inhaler Inhale 1 puff into the lungs 2 times daily  VANESA Mendes CNP   solifenacin (VESICARE) 10 MG tablet TAKE ONE TABLET BY MOUTH EVERY DAY  Brandon Graham MD   traZODone (DESYREL) 100 MG tablet TAKE ONE TABLET BY MOUTH NIGHTLY  Carlos Reyes MD   meloxicam (MOBIC) 15 MG tablet Take 15 mg by mouth daily  Historical Provider, MD   albuterol sulfate HFA (PROAIR HFA) 108 (90 BASE) MCG/ACT inhaler Inhale 2 puffs into the lungs every 6 hours as needed for Wheezing  Patient taking differently: Inhale 2 puffs into the lungs every 6 hours as needed for Wheezing  Carlos Reyes MD   hydrochlorothiazide (HYDRODIURIL) 25 MG tablet Take 1 tablet by mouth 2 times daily  Carlos Reyes MD   valACYclovir (VALTREX) 500 MG tablet Take 1 tablet by mouth 2 times daily as needed  Carlos Reyes MD       Social History     Tobacco Use    Smoking status: Former Smoker    Smokeless tobacco: Never Used   Substance Use Topics    Alcohol use: No    Drug use: No        Allergies   Allergen Reactions    Augmentin [Amoxicillin-Pot Clavulanate] Diarrhea   ,   Past Medical History:   Diagnosis Date    Acne     Herniated disc     L4-L5    Hip pain, left     Hyperlipidemia     TMJ (temporomandibular joint disorder)    ,   Past Surgical History:   Procedure pain, unspecified type  Atypical.  Probably musculoskeletal.  EKG from 2018 was normal.  Will recheck with patient in a couple of weeks to see if any changes. Will eventually schedule for outpatient stress test and ECHO after shelter-in-place lifted. 2. Pure hypercholesterolemia  The patient has hyperlipidemia without statin intolerance. The patient will be continued on high intensity statin. The labs are managed by PCP. As per recent guidelines, moderate dose high intensity statin is indicated. 3. Essential hypertension  Patient has essential hypertension on BP medications. The guideline-directed target for BP in this patient is <130/80. In order to reach our target BP we will continue current BP medications, increasing the dose of current meds or adding new to reach target. Return in about 2 weeks (around 4/8/2020) for followup cv disease. An  electronic signature was used to authenticate this note. --Kit Haskins MD on 3/25/2020 at 11:39 AM        Pursuant to the emergency declaration under the [facility] waiver authority and the [employer] Act, this Virtual  Visit was conducted, with patient's consent, to reduce the patient's risk of exposure to COVID-19 and provide continuity of care for an established patient. Services were provided through a video synchronous discussion virtually to substitute for in-person clinic visit.

## 2020-05-30 DIAGNOSIS — E78.00 PURE HYPERCHOLESTEROLEMIA: ICD-10-CM

## 2020-05-30 LAB
ALBUMIN SERPL-MCNC: 4.4 G/DL (ref 3.5–4.6)
ALP BLD-CCNC: 81 U/L (ref 40–130)
ALT SERPL-CCNC: 25 U/L (ref 0–33)
AST SERPL-CCNC: 21 U/L (ref 0–35)
BILIRUB SERPL-MCNC: 0.6 MG/DL (ref 0.2–0.7)
BILIRUBIN DIRECT: <0.2 MG/DL (ref 0–0.4)
BILIRUBIN, INDIRECT: NORMAL MG/DL (ref 0–0.6)
CHOLESTEROL, TOTAL: 159 MG/DL (ref 0–199)
HDLC SERPL-MCNC: 47 MG/DL (ref 40–59)
LDL CHOLESTEROL CALCULATED: 92 MG/DL (ref 0–129)
TOTAL PROTEIN: 7.8 G/DL (ref 6.3–8)
TRIGL SERPL-MCNC: 99 MG/DL (ref 0–150)

## 2020-06-05 ENCOUNTER — VIRTUAL VISIT (OUTPATIENT)
Dept: FAMILY MEDICINE CLINIC | Age: 65
End: 2020-06-05
Payer: COMMERCIAL

## 2020-06-05 VITALS — DIASTOLIC BLOOD PRESSURE: 80 MMHG | SYSTOLIC BLOOD PRESSURE: 128 MMHG | HEART RATE: 88 BPM | RESPIRATION RATE: 18 BRPM

## 2020-06-05 PROBLEM — J45.909 ASTHMA: Status: ACTIVE | Noted: 2018-02-22

## 2020-06-05 PROCEDURE — 3017F COLORECTAL CA SCREEN DOC REV: CPT | Performed by: NURSE PRACTITIONER

## 2020-06-05 PROCEDURE — G8428 CUR MEDS NOT DOCUMENT: HCPCS | Performed by: NURSE PRACTITIONER

## 2020-06-05 PROCEDURE — 99214 OFFICE O/P EST MOD 30 MIN: CPT | Performed by: NURSE PRACTITIONER

## 2020-06-05 ASSESSMENT — ENCOUNTER SYMPTOMS
GASTROINTESTINAL NEGATIVE: 1
ALLERGIC/IMMUNOLOGIC NEGATIVE: 1
ANAL BLEEDING: 0
RECTAL PAIN: 0
CONSTIPATION: 0
VOICE CHANGE: 0
SHORTNESS OF BREATH: 0
DIARRHEA: 0
COLOR CHANGE: 0
EYES NEGATIVE: 1
ABDOMINAL PAIN: 0
RESPIRATORY NEGATIVE: 1
TROUBLE SWALLOWING: 0
BLOOD IN STOOL: 0

## 2020-06-05 NOTE — PROGRESS NOTES
TELEHEALTH EVALUATION -- Audio/Visual (During BVEDL-35 public health emergency)    -   Megan Williamson is a 62 y.o. female being evaluated by a Virtual Visit (video visit) encounter to address concerns as mentioned above. A caregiver was present when appropriate. Due to this being a TeleHealth encounter (During FARXT-16 public health emergency), evaluation of the following organ systems was limited: Vitals/Constitutional/EENT/Resp/CV/GI//MS/Neuro/Skin/Heme-Lymph-Imm. Pursuant to the emergency declaration under the [facility] [address] [place] [address] authority and the [employer] Act, this Virtual Visit was conducted with patient's (and/or legal guardian's) consent, to reduce the patient's risk of exposure to COVID-19 and provide necessary medical care. The patient (and/or legal guardian) has also been advised to contact this office for worsening conditions or problems, and seek emergency medical treatment and/or call 911 if deemed necessary. Patient was contacted and agreed to proceed with a virtual visit via [email or web address]. me  The risks and benefits of converting to a virtual visit were discussed in light of the current infectious disease epidemic. Patient also understood that insurance coverage and co-pays are up to their individual insurance plans. Patient was located at their home. Provider was located at their office. 2020  Megan Williamson (:  1955) has requested an audio/video evaluation for the following concern(s):    Hyperlipidemia   This is a chronic problem. The problem is controlled. Recent lipid tests were reviewed and are normal. Exacerbating diseases include obesity. She has no history of chronic renal disease, diabetes, hypothyroidism, liver disease or nephrotic syndrome. Pertinent negatives include no chest pain or shortness of breath. Current antihyperlipidemic treatment includes statins.  The current mometasone-formoterol (DULERA) 100-5 MCG/ACT inhaler Inhale 1 puff into the lungs 2 times daily  VANESA Mercer - CNP   solifenacin (VESICARE) 10 MG tablet TAKE ONE TABLET BY MOUTH EVERY DAY  Cary Mello MD   traZODone (DESYREL) 100 MG tablet TAKE ONE TABLET BY MOUTH NIGHTLY  Ez Kimball MD   meloxicam (MOBIC) 15 MG tablet Take 15 mg by mouth daily  Historical Provider, MD   hydrochlorothiazide (HYDRODIURIL) 25 MG tablet Take 1 tablet by mouth 2 times daily  Ez Kimball MD   valACYclovir (VALTREX) 500 MG tablet Take 1 tablet by mouth 2 times daily as needed  Ez Kimball MD       Past Medical History:   Diagnosis Date    Acne     Herniated disc     L4-L5    Hip pain, left     Hyperlipidemia     TMJ (temporomandibular joint disorder)      Past Surgical History:   Procedure Laterality Date    BUNIONECTOMY Right 09/2019    HYSTERECTOMY  2000    THYROID SURGERY  1968     Social History     Socioeconomic History    Marital status:      Spouse name: Not on file    Number of children: Not on file    Years of education: Not on file    Highest education level: Not on file   Occupational History    Not on file   Social Needs    Financial resource strain: Not on file    Food insecurity     Worry: Not on file     Inability: Not on file    Transportation needs     Medical: Not on file     Non-medical: Not on file   Tobacco Use    Smoking status: Former Smoker    Smokeless tobacco: Never Used   Substance and Sexual Activity    Alcohol use: No    Drug use: No    Sexual activity: Yes   Lifestyle    Physical activity     Days per week: Not on file     Minutes per session: Not on file    Stress: Not on file   Relationships    Social connections     Talks on phone: Not on file     Gets together: Not on file     Attends Baptism service: Not on file     Active member of club or organization: Not on file     Attends meetings of clubs or organizations: Not on file     Relationship status: Not on file    Intimate partner violence     Fear of current or ex partner: Not on file     Emotionally abused: Not on file     Physically abused: Not on file     Forced sexual activity: Not on file   Other Topics Concern    Not on file   Social History Narrative    Not on file     Family History   Problem Relation Age of Onset    Heart Disease Mother     High Blood Pressure Father     Heart Disease Father      Allergies   Allergen Reactions    Augmentin [Amoxicillin-Pot Clavulanate] Diarrhea       PMH, Surgical Hx, Family Hx, and Social Hx reviewed and updated. Health Maintenance reviewed. PHYSICAL EXAMINATION:  \"[x]\" Indicates a positive item  \"[]\" Indicates a negative item    Vital Signs: (As obtained by patient/caregiver or practitioner observation)    Blood pressure-  Heart rate-    Respiratory rate-    Temperature-  Pulse oximetry-     Constitutional: [x] Appears well-developed and well-nourished [x] No apparent distress      [] Abnormal-   Mental status  [x] Alert and awake  [x] Oriented to person/place/time [x]Able to follow commands      Eyes:  EOM    []  Normal  [] Abnormal-  Sclera  [x]  Normal  [] Abnormal -         Discharge [x]  None visible  [] Abnormal -    HENT:   [x] Normocephalic, atraumatic.   [] Abnormal   [x] Mouth/Throat: Mucous membranes are moist.     External Ears [x] Normal  [] Abnormal-     Neck: [x] No visualized mass     Pulmonary/Chest: [x] Respiratory effort normal.  [x] No visualized signs of difficulty breathing or respiratory distress        [] Abnormal-      Musculoskeletal:   [x] Normal gait with no signs of ataxia         [x] Normal range of motion of neck        [] Abnormal-       Neurological:       [x] No Facial Asymmetry (Cranial nerve 7 motor function) (limited exam to video visit)          [x] No gaze palsy        [] Abnormal-         Skin:        [x] No significant exanthematous lesions or discoloration noted on facial skin         [] Abnormal-

## 2020-07-23 ENCOUNTER — OFFICE VISIT (OUTPATIENT)
Dept: FAMILY MEDICINE CLINIC | Age: 65
End: 2020-07-23
Payer: COMMERCIAL

## 2020-07-23 VITALS
OXYGEN SATURATION: 98 % | HEIGHT: 63 IN | TEMPERATURE: 97.8 F | RESPIRATION RATE: 18 BRPM | HEART RATE: 65 BPM | BODY MASS INDEX: 42.35 KG/M2 | SYSTOLIC BLOOD PRESSURE: 132 MMHG | WEIGHT: 239 LBS | DIASTOLIC BLOOD PRESSURE: 76 MMHG

## 2020-07-23 PROBLEM — F41.9 ANXIETY AND DEPRESSION: Status: ACTIVE | Noted: 2020-07-23

## 2020-07-23 PROBLEM — E55.9 VITAMIN D DEFICIENCY: Status: ACTIVE | Noted: 2020-07-23

## 2020-07-23 PROBLEM — L28.2 PRURITIC RASH: Status: ACTIVE | Noted: 2020-07-23

## 2020-07-23 PROBLEM — M85.851 OSTEOPENIA OF RIGHT HIP: Status: ACTIVE | Noted: 2020-07-23

## 2020-07-23 PROBLEM — J45.20 MILD INTERMITTENT ASTHMA WITHOUT COMPLICATION: Status: ACTIVE | Noted: 2020-07-23

## 2020-07-23 PROBLEM — N32.81 OVERACTIVE BLADDER: Status: ACTIVE | Noted: 2020-07-23

## 2020-07-23 PROBLEM — L50.9 URTICARIA: Status: ACTIVE | Noted: 2020-07-23

## 2020-07-23 PROBLEM — F32.A ANXIETY AND DEPRESSION: Status: ACTIVE | Noted: 2020-07-23

## 2020-07-23 PROCEDURE — G8417 CALC BMI ABV UP PARAM F/U: HCPCS | Performed by: PHYSICIAN ASSISTANT

## 2020-07-23 PROCEDURE — 99214 OFFICE O/P EST MOD 30 MIN: CPT | Performed by: PHYSICIAN ASSISTANT

## 2020-07-23 PROCEDURE — G8427 DOCREV CUR MEDS BY ELIG CLIN: HCPCS | Performed by: PHYSICIAN ASSISTANT

## 2020-07-23 PROCEDURE — 1036F TOBACCO NON-USER: CPT | Performed by: PHYSICIAN ASSISTANT

## 2020-07-23 PROCEDURE — 3017F COLORECTAL CA SCREEN DOC REV: CPT | Performed by: PHYSICIAN ASSISTANT

## 2020-07-23 RX ORDER — HYDROXYZINE 50 MG/1
50 TABLET, FILM COATED ORAL EVERY 8 HOURS PRN
Qty: 30 TABLET | Refills: 0 | Status: SHIPPED | OUTPATIENT
Start: 2020-07-23 | End: 2020-08-02

## 2020-07-23 RX ORDER — SOLIFENACIN SUCCINATE 10 MG/1
TABLET, FILM COATED ORAL
Qty: 30 TABLET | Refills: 5 | Status: SHIPPED | OUTPATIENT
Start: 2020-07-23 | End: 2021-03-06

## 2020-07-23 RX ORDER — CETIRIZINE HYDROCHLORIDE 10 MG/1
10 TABLET ORAL DAILY
Qty: 90 TABLET | Refills: 1 | Status: SHIPPED | OUTPATIENT
Start: 2020-07-23 | End: 2020-11-16

## 2020-07-23 RX ORDER — PREDNISONE 10 MG/1
TABLET ORAL
Qty: 51 TABLET | Refills: 0 | Status: SHIPPED | OUTPATIENT
Start: 2020-07-23 | End: 2020-08-02

## 2020-07-23 RX ORDER — ROSUVASTATIN CALCIUM 5 MG/1
5 TABLET, COATED ORAL DAILY
Qty: 30 TABLET | Refills: 11 | Status: SHIPPED | OUTPATIENT
Start: 2020-07-23 | End: 2021-03-30

## 2020-07-23 RX ORDER — ERGOCALCIFEROL 1.25 MG/1
50000 CAPSULE ORAL WEEKLY
Qty: 4 CAPSULE | Refills: 11 | Status: SHIPPED | OUTPATIENT
Start: 2020-07-23 | End: 2021-04-26 | Stop reason: SDUPTHER

## 2020-07-23 ASSESSMENT — PATIENT HEALTH QUESTIONNAIRE - PHQ9
1. LITTLE INTEREST OR PLEASURE IN DOING THINGS: 0
SUM OF ALL RESPONSES TO PHQ QUESTIONS 1-9: 0
SUM OF ALL RESPONSES TO PHQ9 QUESTIONS 1 & 2: 0
2. FEELING DOWN, DEPRESSED OR HOPELESS: 0
SUM OF ALL RESPONSES TO PHQ QUESTIONS 1-9: 0

## 2020-07-23 ASSESSMENT — ENCOUNTER SYMPTOMS
COUGH: 0
ABDOMINAL DISTENTION: 0
CHEST TIGHTNESS: 0
WHEEZING: 0
SHORTNESS OF BREATH: 0
ABDOMINAL PAIN: 0
COLOR CHANGE: 0

## 2020-07-23 NOTE — PROGRESS NOTES
Subjective  Rip Leeann, 62 y.o. female presents today with:  Chief Complaint   Patient presents with   Alex Becerra Doctor     Former pt of [address] Rash     recurrent      HPI  Niall Esparza is in the office today to establish care. Previous PCP: Eugenio Martinez NP     Health maintenance. UTD with labs. UTD with mammogram and DEXA. Rash. Recurrent. Has been ongoing since last year. Initially thought rash was due to irritant contact dermatitis. Has been tried on numerous creams/steroids/ointments with no relief. She saw Dr. Carter Toth x 2, had punch biopsy. Was told that she \"doesn't have cancer\". Recently weaned herself off all of her medications. After stopping lexapro--rash resolved. She recently restarted lexapro as she started having episodes of depressed mood/anxiety. Rash since returned on LLE and now on RUE. Patient has tried benadryl for the itching. Has yet to try zyrtec. Reactive Depression. Has been on lexapro for a number of years. Medication makes her feel stable. Stopped medication several weeks ago to see if the rash on her LLE would resolve. Felt unstable, labile mood, depressed/tearful. Weighed risks vs. Benefits and resumed medication. She feels emotional stable. Asthma. Exercise induced. Used albuterol inhaler for exercise/weather change. Denies cough/chest pain/sob. Review of Systems   Constitutional: Negative for activity change, appetite change, chills, diaphoresis, fatigue, fever and unexpected weight change. Respiratory: Negative for cough, chest tightness, shortness of breath and wheezing. Cardiovascular: Negative for chest pain, palpitations and leg swelling. Gastrointestinal: Negative for abdominal distention and abdominal pain. Skin: Positive for rash. Negative for color change. Neurological: Negative for dizziness, weakness, light-headedness, numbness and headaches.    Psychiatric/Behavioral: Negative for dysphoric mood and sleep disturbance. The patient is not nervous/anxious.       Past Medical History:   Diagnosis Date    Acne     Herniated disc     L4-L5    Hip pain, left     Hyperlipidemia     TMJ (temporomandibular joint disorder)      Past Surgical History:   Procedure Laterality Date    BUNIONECTOMY Right 2019    HYSTERECTOMY  2000    THYROID SURGERY  1968     Social History     Socioeconomic History    Marital status:      Spouse name: Not on file    Number of children: Not on file    Years of education: Not on file    Highest education level: Not on file   Occupational History    Not on file   Social Needs    Financial resource strain: Not on file    Food insecurity     Worry: Not on file     Inability: Not on file    Transportation needs     Medical: Not on file     Non-medical: Not on file   Tobacco Use    Smoking status: Former Smoker     Packs/day: 0.25     Years: 20.00     Pack years: 5.00     Types: Cigarettes     Last attempt to quit:      Years since quittin.5    Smokeless tobacco: Never Used   Substance and Sexual Activity    Alcohol use: No    Drug use: No    Sexual activity: Yes   Lifestyle    Physical activity     Days per week: Not on file     Minutes per session: Not on file    Stress: Not on file   Relationships    Social connections     Talks on phone: Not on file     Gets together: Not on file     Attends [employer] service: Not on file     Active member of club or organization: Not on file     Attends meetings of clubs or organizations: Not on file     Relationship status: Not on file    Intimate partner violence     Fear of current or ex partner: Not on file     Emotionally abused: Not on file     Physically abused: Not on file     Forced sexual activity: Not on file   Other Topics Concern    Not on file   Social History Narrative    Not on file     Family History   Problem Relation Age of Onset    Heart Disease Mother     High Blood Pressure Father     Heart Disease Father      Allergies   Allergen Reactions    Augmentin [Amoxicillin-Pot Clavulanate] Diarrhea     Current Outpatient Medications   Medication Sig Dispense Refill    rosuvastatin (CRESTOR) 5 MG tablet Take 1 tablet by mouth daily 30 tablet 11    vitamin D (ERGOCALCIFEROL) 1.25 MG (57595 UT) CAPS capsule Take 1 capsule by mouth once a week 4 capsule 11    solifenacin (VESICARE) 10 MG tablet TAKE ONE TABLET BY MOUTH EVERY DAY 30 tablet 5    predniSONE (DELTASONE) 10 MG tablet Take 6 tabs x 6 days, then 5 x 1, 4 x 1, 3 x 1, 2 x 1, 1 x 1 51 tablet 0    cetirizine (ZYRTEC) 10 MG tablet Take 1 tablet by mouth daily 90 tablet 1    hydrOXYzine (ATARAX) 50 MG tablet Take 1 tablet by mouth every 8 hours as needed for Itching 30 tablet 0    escitalopram (LEXAPRO) 20 MG tablet TAKE ONE TABLET BY MOUTH EVERY DAY 30 tablet 11    albuterol sulfate (PROAIR RESPICLICK) 324 (90 Base) MCG/ACT aerosol powder inhalation Inhale 2 puffs into the lungs every 6 hours as needed for Wheezing or Shortness of Breath 1 Inhaler 5    traZODone (DESYREL) 100 MG tablet TAKE ONE TABLET BY MOUTH NIGHTLY 30 tablet 0    meloxicam (MOBIC) 15 MG tablet Take 15 mg by mouth daily  1    hydrochlorothiazide (HYDRODIURIL) 25 MG tablet Take 1 tablet by mouth 2 times daily 60 tablet 11    valACYclovir (VALTREX) 500 MG tablet Take 1 tablet by mouth 2 times daily as needed 20 tablet 0     No current facility-administered medications for this visit. PMH, Surgical Hx, Family Hx, and Social Hx reviewed and updated. Health Maintenance reviewed.     Objective  Vitals:    07/23/20 0856   BP: 132/76   Site: Left Upper Arm   Position: Sitting   Cuff Size: Large Adult   Pulse: 65   Resp: 18   Temp: 97.8 °F (36.6 °C)   TempSrc: Tympanic   SpO2: 98%   Weight: 239 lb (108.4 kg)   Height: 5' 3\" (1.6 m)     BP Readings from Last 3 Encounters:   07/23/20 132/76   06/05/20 128/80   02/20/20 130/80     Wt Readings from Last 3 Encounters: 07/23/20 239 lb (108.4 kg)   02/20/20 243 lb (110.2 kg)   12/03/18 227 lb (103 kg)     Physical Exam  Vitals signs reviewed. Constitutional:       General: She is not in acute distress. Appearance: She is obese. She is not ill-appearing, toxic-appearing or diaphoretic. HENT:      Head: Normocephalic and atraumatic. Right Ear: External ear normal.      Left Ear: External ear normal.      Nose: Nose normal.   Cardiovascular:      Rate and Rhythm: Normal rate and regular rhythm. Pulmonary:      Effort: Pulmonary effort is normal.   Skin:     General: Skin is warm and dry. Findings: Erythema and rash present. Neurological:      Mental Status: She is alert. Assessment & Plan   Walter Washington was seen today for established new doctor and rash. Diagnoses and all orders for this visit:    BMI 40.0-44.9, adult (HCC)    Pruritic rash  -     predniSONE (DELTASONE) 10 MG tablet; Take 6 tabs x 6 days, then 5 x 1, 4 x 1, 3 x 1, 2 x 1, 1 x 1  -     cetirizine (ZYRTEC) 10 MG tablet; Take 1 tablet by mouth daily  -     hydrOXYzine (ATARAX) 50 MG tablet; Take 1 tablet by mouth every 8 hours as needed for Itching    Vitamin D deficiency  -     vitamin D (ERGOCALCIFEROL) 1.25 MG (61916 UT) CAPS capsule; Take 1 capsule by mouth once a week    Pure hypercholesterolemia  -     rosuvastatin (CRESTOR) 5 MG tablet; Take 1 tablet by mouth daily    Overactive bladder  -     solifenacin (VESICARE) 10 MG tablet; TAKE ONE TABLET BY MOUTH EVERY DAY    Urticaria  -     predniSONE (DELTASONE) 10 MG tablet; Take 6 tabs x 6 days, then 5 x 1, 4 x 1, 3 x 1, 2 x 1, 1 x 1  -     cetirizine (ZYRTEC) 10 MG tablet; Take 1 tablet by mouth daily  -     hydrOXYzine (ATARAX) 50 MG tablet; Take 1 tablet by mouth every 8 hours as needed for Itching    Osteopenia of right hip    Anxiety and depression    Mild intermittent asthma without complication    2 week follow up with me.       Orders Placed This Encounter   Medications    rosuvastatin (CRESTOR) 5 MG tablet     Sig: Take 1 tablet by mouth daily     Dispense:  30 tablet     Refill:  11    vitamin D (ERGOCALCIFEROL) 1.25 MG (65440 UT) CAPS capsule     Sig: Take 1 capsule by mouth once a week     Dispense:  4 capsule     Refill:  11    solifenacin (VESICARE) 10 MG tablet     Sig: TAKE ONE TABLET BY MOUTH EVERY DAY     Dispense:  30 tablet     Refill:  5    predniSONE (DELTASONE) 10 MG tablet     Sig: Take 6 tabs x 6 days, then 5 x 1, 4 x 1, 3 x 1, 2 x 1, 1 x 1     Dispense:  51 tablet     Refill:  0    cetirizine (ZYRTEC) 10 MG tablet     Sig: Take 1 tablet by mouth daily     Dispense:  90 tablet     Refill:  1    hydrOXYzine (ATARAX) 50 MG tablet     Sig: Take 1 tablet by mouth every 8 hours as needed for Itching     Dispense:  30 tablet     Refill:  0     Medications Discontinued During This Encounter   Medication Reason    mometasone-formoterol (DULERA) 100-5 MCG/ACT inhaler LIST CLEANUP    rosuvastatin (CRESTOR) 5 MG tablet REORDER    vitamin D (ERGOCALCIFEROL) 1.25 MG (18932 UT) CAPS capsule REORDER    solifenacin (VESICARE) 10 MG tablet REORDER     Return in about 2 weeks (around 8/6/2020) for follow up in office. Reviewed with the patient: current clinical status, medications, activities and diet. Side effects, adverse effects of the medication prescribed today, as well as treatment plan/ rationale and result expectations have been discussed with the patient who expresses understanding and desires to proceed. Close follow up to evaluate treatment results and for coordination of care. I have reviewed the patient's medical history in detail and updated the computerized patient record.     Milagros Huang PA-C

## 2020-08-14 ENCOUNTER — OFFICE VISIT (OUTPATIENT)
Dept: FAMILY MEDICINE CLINIC | Age: 65
End: 2020-08-14
Payer: COMMERCIAL

## 2020-08-14 VITALS
SYSTOLIC BLOOD PRESSURE: 112 MMHG | TEMPERATURE: 96.6 F | OXYGEN SATURATION: 99 % | DIASTOLIC BLOOD PRESSURE: 72 MMHG | RESPIRATION RATE: 16 BRPM | HEART RATE: 58 BPM | HEIGHT: 63 IN | BODY MASS INDEX: 43.38 KG/M2 | WEIGHT: 244.8 LBS

## 2020-08-14 PROBLEM — J45.909 ASTHMA: Status: RESOLVED | Noted: 2018-02-22 | Resolved: 2020-08-14

## 2020-08-14 PROCEDURE — G8427 DOCREV CUR MEDS BY ELIG CLIN: HCPCS | Performed by: PHYSICIAN ASSISTANT

## 2020-08-14 PROCEDURE — 3017F COLORECTAL CA SCREEN DOC REV: CPT | Performed by: PHYSICIAN ASSISTANT

## 2020-08-14 PROCEDURE — 1036F TOBACCO NON-USER: CPT | Performed by: PHYSICIAN ASSISTANT

## 2020-08-14 PROCEDURE — 99213 OFFICE O/P EST LOW 20 MIN: CPT | Performed by: PHYSICIAN ASSISTANT

## 2020-08-14 PROCEDURE — G8417 CALC BMI ABV UP PARAM F/U: HCPCS | Performed by: PHYSICIAN ASSISTANT

## 2020-08-14 RX ORDER — TRIAMCINOLONE ACETONIDE 0.25 MG/G
CREAM TOPICAL
Qty: 80 G | Refills: 1 | Status: SHIPPED | OUTPATIENT
Start: 2020-08-14 | End: 2020-12-14

## 2020-08-14 ASSESSMENT — ENCOUNTER SYMPTOMS
CHEST TIGHTNESS: 0
ABDOMINAL PAIN: 0
WHEEZING: 0
SHORTNESS OF BREATH: 0
COUGH: 0
COLOR CHANGE: 0
ABDOMINAL DISTENTION: 0

## 2020-08-14 NOTE — PROGRESS NOTES
Subjective  Christiano Danielle, 62 y.o. female presents today with:  Chief Complaint   Patient presents with    Rash     follow up      HPI  Pat Lopez is in the office today for follow up. Last OV with me: 7/23/2020 as new patient. Rash. Recurrent. Has been ongoing since last year. Initially thought rash was due to irritant contact dermatitis. Has been tried on numerous creams/steroids/ointments with no relief. She saw Dr. Seferino Bird x 2, had punch biopsy. Was told that she \"doesn't have cancer\". Recently weaned herself off all of her medications. After stopping lexapro--rash resolved. She recently restarted lexapro as she started having episodes of depressed mood/anxiety. Rash since returned on LLE and now on RUE. Patient has tried benadryl for the itching. I started patient on a 10 day prednisone taper, zyrtec and atarax for pruritis. Reports that rash is almost 100% resolved. Has restarted all medications, no new rash or side effects.      Review of Systems   Constitutional: Negative for activity change, appetite change, chills, diaphoresis, fatigue, fever and unexpected weight change. Respiratory: Negative for cough, chest tightness, shortness of breath and wheezing. Cardiovascular: Negative for chest pain, palpitations and leg swelling. Gastrointestinal: Negative for abdominal distention and abdominal pain. Skin: Positive for rash (almost completely resolved). Negative for color change. Neurological: Negative for dizziness, weakness, light-headedness, numbness and headaches. Psychiatric/Behavioral: Negative for dysphoric mood and sleep disturbance. The patient is not nervous/anxious.         Past Medical History:   Diagnosis Date    Acne     Herniated disc     L4-L5    Hip pain, left     Hyperlipidemia     TMJ (temporomandibular joint disorder)      Past Surgical History:   Procedure Laterality Date    BUNIONECTOMY Right 09/2019    HYSTERECTOMY  2000    THYROID D (ERGOCALCIFEROL) 1.25 MG (48151 UT) CAPS capsule Take 1 capsule by mouth once a week 4 capsule 11    solifenacin (VESICARE) 10 MG tablet TAKE ONE TABLET BY MOUTH EVERY DAY 30 tablet 5    cetirizine (ZYRTEC) 10 MG tablet Take 1 tablet by mouth daily 90 tablet 1    escitalopram (LEXAPRO) 20 MG tablet TAKE ONE TABLET BY MOUTH EVERY DAY 30 tablet 11    albuterol sulfate (PROAIR RESPICLICK) 246 (90 Base) MCG/ACT aerosol powder inhalation Inhale 2 puffs into the lungs every 6 hours as needed for Wheezing or Shortness of Breath 1 Inhaler 5    traZODone (DESYREL) 100 MG tablet TAKE ONE TABLET BY MOUTH NIGHTLY 30 tablet 0    meloxicam (MOBIC) 15 MG tablet Take 15 mg by mouth daily  1    hydrochlorothiazide (HYDRODIURIL) 25 MG tablet Take 1 tablet by mouth 2 times daily 60 tablet 11    valACYclovir (VALTREX) 500 MG tablet Take 1 tablet by mouth 2 times daily as needed 20 tablet 0     No current facility-administered medications for this visit. PMH, Surgical Hx, Family Hx, and Social Hx reviewed and updated. Health Maintenance reviewed. Objective  Vitals:    08/14/20 0909   BP: 112/72   Site: Left Upper Arm   Position: Sitting   Cuff Size: Large Adult   Pulse: 58   Resp: 16   Temp: 96.6 °F (35.9 °C)   TempSrc: Temporal   SpO2: 99%   Weight: 244 lb 12.8 oz (111 kg)   Height: 5' 3\" (1.6 m)     BP Readings from Last 3 Encounters:   08/14/20 112/72   07/23/20 132/76   06/05/20 128/80     Wt Readings from Last 3 Encounters:   08/14/20 244 lb 12.8 oz (111 kg)   07/23/20 239 lb (108.4 kg)   02/20/20 243 lb (110.2 kg)     Physical Exam  Vitals signs reviewed. Constitutional:       General: She is not in acute distress. Appearance: She is obese. She is not ill-appearing, toxic-appearing or diaphoretic. HENT:      Head: Normocephalic and atraumatic.       Right Ear: External ear normal.      Left Ear: External ear normal.      Nose: Nose normal.   Cardiovascular:      Rate and Rhythm: Normal rate and regular rhythm. Pulmonary:      Effort: Pulmonary effort is normal.      Breath sounds: Normal breath sounds. Skin:     General: Skin is warm and dry. Findings: No erythema or rash. Neurological:      General: No focal deficit present. Mental Status: She is alert and oriented to person, place, and time. Psychiatric:         Mood and Affect: Mood normal.         Behavior: Behavior normal.         Thought Content: Thought content normal.         Judgment: Judgment normal.       Assessment & Plan   Abdirahman Diaz was seen today for rash. Diagnoses and all orders for this visit:    Pruritic rash  -     triamcinolone (KENALOG) 0.025 % cream; Apply topically 2 times daily. Urticaria  -     triamcinolone (KENALOG) 0.025 % cream; Apply topically 2 times daily. Colon cancer screening  -     Cologuard (For External Results Only); Future    PRN kenalog cream if needed. 4 month follow up with me. Orders Placed This Encounter   Procedures    Cologuard (For External Results Only)     This test is performed by an external laboratory and is used for result attachment only. It is required that this order requisition be faxed to: [facility] @ [phone]. See [email or web address] for further information. Standing Status:   Future     Standing Expiration Date:   8/14/2021     Orders Placed This Encounter   Medications    triamcinolone (KENALOG) 0.025 % cream     Sig: Apply topically 2 times daily. Dispense:  80 g     Refill:  1     There are no discontinued medications. Return in about 4 months (around 12/14/2020) for follow up in office. Reviewed with the patient: current clinical status, medications, activities and diet. Side effects, adverse effects of the medication prescribed today, as well as treatment plan/ rationale and result expectations have been discussed with the patient who expresses understanding and desires to proceed.     Close follow up to evaluate treatment results and for coordination of care. I have reviewed the patient's medical history in detail and updated the computerized patient record.     Milagros Huang PA-C

## 2020-08-25 ENCOUNTER — TELEPHONE (OUTPATIENT)
Dept: FAMILY MEDICINE CLINIC | Age: 65
End: 2020-08-25

## 2020-09-06 RX ORDER — MELOXICAM 15 MG/1
15 TABLET ORAL DAILY
Qty: 30 TABLET | Refills: 1 | Status: SHIPPED | OUTPATIENT
Start: 2020-09-06 | End: 2020-11-09

## 2020-11-16 ENCOUNTER — HOSPITAL ENCOUNTER (OUTPATIENT)
Age: 65
Setting detail: SPECIMEN
Discharge: HOME | End: 2020-11-16
Payer: COMMERCIAL

## 2020-11-16 ENCOUNTER — TELEPHONE (OUTPATIENT)
Dept: FAMILY MEDICINE CLINIC | Age: 65
End: 2020-11-16

## 2020-11-16 ENCOUNTER — VIRTUAL VISIT (OUTPATIENT)
Dept: FAMILY MEDICINE CLINIC | Age: 65
End: 2020-11-16
Payer: COMMERCIAL

## 2020-11-16 PROCEDURE — 99441 PR PHYS/QHP TELEPHONE EVALUATION 5-10 MIN: CPT | Performed by: NURSE PRACTITIONER

## 2020-11-16 ASSESSMENT — ENCOUNTER SYMPTOMS
SINUS PAIN: 0
NAUSEA: 1
RHINORRHEA: 1
SINUS PRESSURE: 0
VOMITING: 0
WHEEZING: 0
SORE THROAT: 1
ABDOMINAL PAIN: 0
DIARRHEA: 0
CHEST TIGHTNESS: 1
SHORTNESS OF BREATH: 1
COUGH: 1

## 2020-11-16 NOTE — PROGRESS NOTES
Micky Garcia, 60 y.o. female presents today with:  Chief Complaint   Patient presents with    Shortness of Breath     started yesterday SOB; sore throat; fatigue fever during the night; night chills; cough, chest congetion; headache, runny nose; body aches (hurts to try to close your hand); yesterday was a little nausea yesterday but is a little better today; has been drinking fluids and not eating; has been taking tynenol but it is not helping    Cough    Headache    Congestion       Micky Garcia is a 60 y.o. female evaluated via telephone on 11/16/2020. Consent:  She and/or health care decision maker is aware that that she may receive a bill for this telephone service, depending on her insurance coverage, and has provided verbal consent to proceed: Yes    Patient requests Covid-19 test.  Declines additional testing. URI    This is a new problem. The current episode started yesterday. The problem has been gradually worsening. Maximum temperature: subjective. The fever has been present for less than 1 day. Associated symptoms include congestion, coughing, headaches, nausea, rhinorrhea and a sore throat. Pertinent negatives include no abdominal pain, chest pain, diarrhea, ear pain, plugged ear sensation, rash, sinus pain, vomiting or wheezing. She has tried acetaminophen for the symptoms. The treatment provided mild relief. Patient reports:    [x] Fever [x] Shortness of breath [] Diarrhea    [x] Cough [x] Chest tightness [] Working in healthcare    [] Loss of sense of smell [] Loss of sense of taste    [] History of travel to COVID-19 infested area    [] Close contact to known COVID-19 person         There were no vitals filed for this visit. Review of Systems   Constitutional: Positive for chills and fatigue. Negative for fever. HENT: Positive for congestion, rhinorrhea and sore throat. Negative for ear pain, postnasal drip, sinus pressure and sinus pain.     Respiratory: Positive for cough, chest tightness and shortness of breath (when coughing). Negative for wheezing. Cardiovascular: Negative for chest pain. Gastrointestinal: Positive for nausea. Negative for abdominal pain, diarrhea and vomiting. Musculoskeletal: Positive for myalgias. Negative for arthralgias. Skin: Negative for rash. Neurological: Positive for headaches. Physical Exam   Due to the current efforts to prevent transmission of COVID-19 and also the need to preserve PPE for other caregivers, a face-to-face encounter with the patient was not performed. That being said, all relevant records and diagnostic tests were reviewed, including laboratory results and imaging. Please reference any relevant documentation elsewhere. Care will be coordinated with the primary service. Assessment/Plan:  1. Encounter by telehealth for suspected COVID-19  - COVID-19 Ambulatory; Future    Reviewed usual course of illness, preventing the spread to others/self isolation, and supportive measures for symptom management. Continue conservative management for now. Will follow up with results. Treatment plan/rationale, follow-up care, and result expectations have been discussed with the patient. Expresses understanding and desires to proceed with the treatment plan. Discussed signs and symptoms which require immediate follow-up in ED/call to 911. Understanding verbalized. I have reviewed and updated the electronic medical record. I affirm this is a Patient Initiated Episode with a Patient who has not had a related appointment within my department in the past 7 days or scheduled within the next 24 hours. Patient identification was verified at the start of the visit: Yes    Total Time: minutes: 5-10 minutes     VANESA Fuentes NP  11/16/20     This visit was provided as a focused evaluation during the COVID -19 pandemic/national emergency.   A comprehensive review of all previous patient history and testing was not conducted. Pertinent findings were elicited during the visit.

## 2020-11-16 NOTE — PATIENT INSTRUCTIONS
Patient Education        Learning About Coronavirus [phone])  Coronavirus [phone]): Overview  What is coronavirus ([id number])? The coronavirus disease (COVID-19) is caused by a virus. It is an illness that was first found in December 2019. It has since spread worldwide. The virus can cause fever, cough, and trouble breathing. In severe cases, it can cause pneumonia and make it hard to breathe without help. It can cause death. This virus spreads person-to-person through droplets from coughing and sneezing. It can also spread when you are close to someone who is infected. And it can spread when you touch something that has the virus on it, such as a doorknob or a tabletop. Coronaviruses are a large group of viruses. They cause the common cold. They also cause more serious illnesses like [place] East respiratory syndrome (MERS) and severe acute respiratory syndrome (SARS). COVID-19 is caused by a novel coronavirus. That means it's a new type that has not been seen in people before. How is COVID-19 treated? Mild illness can be treated at home, but more serious illness needs to be treated in the hospital. Treatment may include medicines to reduce symptoms, plus breathing support such as oxygen therapy or a ventilator. Other treatments, such as antiviral medicines, may help people who have COVID-19. What can you do to protect yourself from COVID-19? The best way to protect yourself from getting sick is to:  · Avoid areas where there is an outbreak. · Avoid contact with people who may be infected. · Avoid crowds and try to stay at least 6 feet away from other people. · Wash your hands often, especially after you cough or sneeze. Use soap and water, and scrub for at least 20 seconds. If soap and water aren't available, use an alcohol-based hand . · Avoid touching your mouth, nose, and eyes. What can you do to avoid spreading the virus to others?   To help avoid spreading the virus to others:  · [employer] your hands often with soap or alcohol-based hand sanitizers. · Cover your mouth with a tissue when you cough or sneeze. Then throw the tissue in the trash. · Use a disinfectant to clean things that you touch often. These include doorknobs, remote controls, phones, and handles on your refrigerator and microwave. And don't forget countertops, tabletops, bathrooms, and computer keyboards. · Wear a cloth face cover if you have to go to public areas. If you know or suspect that you have COVID-19:  · Stay home. Don't go to school, work, or public areas. And don't use public transportation, ride-shares, or taxis unless you have no choice. · Leave your home only if you need to get medical care or testing. But call the doctor's office first so they know you're coming. And wear a face cover. · Limit contact with people in your home. If possible, stay in a separate bedroom and use a separate bathroom. · Wear a face cover whenever you're around other people. It can help stop the spread of the virus when you cough or sneeze. · Clean and disinfect your home every day. Use household  and disinfectant wipes or sprays. Take special care to clean things that you grab with your hands. · Self-isolate until it's safe to be around others again. ? If you have symptoms, it's safe when you haven't had a fever for 3 days and your symptoms have improved and it's been at least 10 days since your symptoms started. ? If you were exposed to the virus but don't have symptoms, it's safe to be around others 14 days after exposure. ? Talk to your doctor about whether you also need testing, especially if you have a weakened immune system. When to call for help  Call 911 anytime you think you may need emergency care. For example, call if:  · You have severe trouble breathing. (You can't talk at all.)  · You have constant chest pain or pressure. · You are severely dizzy or lightheaded.   · You are confused or can't think clearly. · Your face and lips have a blue color. · You passed out (lost consciousness) or are very hard to wake up. Call your doctor now if you develop symptoms such as:  · Shortness of breath. · Fever. · Cough. If you need to get care, call ahead to the doctor's office for instructions before you go. Make sure you wear a face cover to prevent exposing other people to the virus. Where can you get the latest information? The following health organizations are tracking and studying this virus. Their websites contain the most up-to-date information. Ron Perez also learn what to do if you think you may have been exposed to the virus. · U.S. Centers for Disease Control and Prevention (CDC): The CDC provides updated news about the disease and travel advice. The website also tells you how to prevent the spread of infection. www.cdc.gov  · World Health Organization [facility]): WHO offers information about the virus outbreaks. WHO also has travel advice. www.who.int  Current as of: July 10, 2020               Content Version: 12.6  © 2006-2020 [employer]. Care instructions adapted under license by [facility] ([facility]). If you have questions about a medical condition or this instruction, always ask your healthcare professional. [address] any warranty or liability for your use of this information.

## 2020-11-16 NOTE — TELEPHONE ENCOUNTER
Patient is calling wondering what is the next step because she is having Covid-19 symptoms: headaches, fever, sore throat, runny nose, body aches, chest congestion. Please advise and thanks!

## 2020-11-17 DIAGNOSIS — Z20.822 ENCOUNTER BY TELEHEALTH FOR SUSPECTED COVID-19: ICD-10-CM

## 2020-11-19 LAB
SARS-COV-2: DETECTED
SOURCE: ABNORMAL

## 2020-12-14 ENCOUNTER — OFFICE VISIT (OUTPATIENT)
Dept: FAMILY MEDICINE CLINIC | Age: 65
End: 2020-12-14
Payer: COMMERCIAL

## 2020-12-14 ENCOUNTER — HOSPITAL ENCOUNTER (OUTPATIENT)
Dept: GENERAL RADIOLOGY | Age: 65
Discharge: HOME | End: 2020-12-16
Payer: COMMERCIAL

## 2020-12-14 ENCOUNTER — TELEPHONE (OUTPATIENT)
Dept: FAMILY MEDICINE CLINIC | Age: 65
End: 2020-12-14

## 2020-12-14 VITALS
HEIGHT: 63 IN | SYSTOLIC BLOOD PRESSURE: 112 MMHG | RESPIRATION RATE: 16 BRPM | BODY MASS INDEX: 44.16 KG/M2 | WEIGHT: 249.2 LBS | HEART RATE: 65 BPM | OXYGEN SATURATION: 98 % | DIASTOLIC BLOOD PRESSURE: 72 MMHG | TEMPERATURE: 96.8 F

## 2020-12-14 DIAGNOSIS — R06.02 SOB (SHORTNESS OF BREATH): ICD-10-CM

## 2020-12-14 DIAGNOSIS — Z86.16 HISTORY OF 2019 NOVEL CORONAVIRUS DISEASE (COVID-19): ICD-10-CM

## 2020-12-14 PROBLEM — B36.0 TINEA VERSICOLOR: Status: ACTIVE | Noted: 2020-12-14

## 2020-12-14 PROBLEM — L50.9 URTICARIA: Status: RESOLVED | Noted: 2020-07-23 | Resolved: 2020-12-14

## 2020-12-14 LAB
ALBUMIN SERPL-MCNC: 3.9 G/DL (ref 3.5–4.6)
ALP BLD-CCNC: 72 U/L (ref 40–130)
ALT SERPL-CCNC: 19 U/L (ref 0–33)
ANION GAP SERPL CALCULATED.3IONS-SCNC: 11 MEQ/L (ref 9–15)
AST SERPL-CCNC: 19 U/L (ref 0–35)
BILIRUB SERPL-MCNC: 0.5 MG/DL (ref 0.2–0.7)
BUN BLDV-MCNC: 13 MG/DL (ref 8–23)
CALCIUM SERPL-MCNC: 9.3 MG/DL (ref 8.5–9.9)
CHLORIDE BLD-SCNC: 103 MEQ/L (ref 95–107)
CO2: 28 MEQ/L (ref 20–31)
CREAT SERPL-MCNC: 0.81 MG/DL (ref 0.5–0.9)
GFR AFRICAN AMERICAN: >60
GFR NON-AFRICAN AMERICAN: >60
GLOBULIN: 3.5 G/DL (ref 2.3–3.5)
GLUCOSE BLD-MCNC: 86 MG/DL (ref 70–99)
POTASSIUM SERPL-SCNC: 4.4 MEQ/L (ref 3.4–4.9)
SODIUM BLD-SCNC: 142 MEQ/L (ref 135–144)
TOTAL PROTEIN: 7.4 G/DL (ref 6.3–8)

## 2020-12-14 PROCEDURE — 3017F COLORECTAL CA SCREEN DOC REV: CPT | Performed by: PHYSICIAN ASSISTANT

## 2020-12-14 PROCEDURE — 1036F TOBACCO NON-USER: CPT | Performed by: PHYSICIAN ASSISTANT

## 2020-12-14 PROCEDURE — G8482 FLU IMMUNIZE ORDER/ADMIN: HCPCS | Performed by: PHYSICIAN ASSISTANT

## 2020-12-14 PROCEDURE — G8417 CALC BMI ABV UP PARAM F/U: HCPCS | Performed by: PHYSICIAN ASSISTANT

## 2020-12-14 PROCEDURE — G8428 CUR MEDS NOT DOCUMENT: HCPCS | Performed by: PHYSICIAN ASSISTANT

## 2020-12-14 PROCEDURE — 99214 OFFICE O/P EST MOD 30 MIN: CPT | Performed by: PHYSICIAN ASSISTANT

## 2020-12-14 PROCEDURE — 71046 X-RAY EXAM CHEST 2 VIEWS: CPT

## 2020-12-14 RX ORDER — CLOBETASOL PROPIONATE 0.5 MG/G
CREAM TOPICAL
Qty: 45 G | Refills: 1 | Status: ON HOLD | OUTPATIENT
Start: 2020-12-14 | End: 2021-04-20

## 2020-12-14 RX ORDER — CLOBETASOL PROPIONATE 0.5 MG/G
CREAM TOPICAL
Qty: 60 G | Refills: 1 | Status: SHIPPED | OUTPATIENT
Start: 2020-12-14 | End: 2020-12-14 | Stop reason: DRUGHIGH

## 2020-12-14 RX ORDER — DEXAMETHASONE 6 MG/1
6 TABLET ORAL
Qty: 5 TABLET | Refills: 0 | Status: SHIPPED | OUTPATIENT
Start: 2020-12-14 | End: 2020-12-19

## 2020-12-14 RX ORDER — TRAZODONE HYDROCHLORIDE 100 MG/1
TABLET ORAL
Qty: 30 TABLET | Refills: 5 | Status: SHIPPED | OUTPATIENT
Start: 2020-12-14

## 2020-12-14 RX ORDER — SELENIUM SULFIDE 2.5 MG/100ML
LOTION TOPICAL
Qty: 118 ML | Refills: 1 | Status: SHIPPED | OUTPATIENT
Start: 2020-12-14 | End: 2021-01-13

## 2020-12-14 ASSESSMENT — PATIENT HEALTH QUESTIONNAIRE - PHQ9
SUM OF ALL RESPONSES TO PHQ9 QUESTIONS 1 & 2: 2
1. LITTLE INTEREST OR PLEASURE IN DOING THINGS: 1
SUM OF ALL RESPONSES TO PHQ QUESTIONS 1-9: 2
2. FEELING DOWN, DEPRESSED OR HOPELESS: 1
SUM OF ALL RESPONSES TO PHQ QUESTIONS 1-9: 2
SUM OF ALL RESPONSES TO PHQ QUESTIONS 1-9: 2

## 2020-12-14 ASSESSMENT — ENCOUNTER SYMPTOMS
ABDOMINAL PAIN: 0
ABDOMINAL DISTENTION: 0
WHEEZING: 0
COLOR CHANGE: 0
COUGH: 0
CHEST TIGHTNESS: 1
SHORTNESS OF BREATH: 1

## 2020-12-14 NOTE — PROGRESS NOTES
Subjective  Cricket Aschoff, 60 y.o. female presents today with:  Chief Complaint   Patient presents with    Rash     on leg, itching     Shortness of Breath     s/p covid     HPI  Austin Mercedes is in the office today for follow up. Last OV with me: 8/14/2020    COVID-19. Positive with COVID 19 on 11/19/2020. Had SOB, chest tightness, HA, sinus congestion. Overall, feeling better. She is having residual SOB, chest tightness. Feels like she constantly has to take \"deep breaths\". History of asthma--not using albuterol inhaler. Feels like she will wheeze at night. Denies leg swelling. Rash/dermatitis. Recurrent. Has been seeing dermatologist, Dr. Barbara Baron, at [facility]. He ordered patch test for January due to recurrent outbreak of rash. Asking for refill of clobetasol as this topical steroid works well.  +pruritis, erythema, dry skin. Anxiety and depression. Asking for refill of trazodone for trouble sleeping. Stable on lexapro. Denies worsening depressed mood. Anxiety around covid. Review of Systems   Constitutional: Negative for activity change, appetite change, chills, diaphoresis, fatigue, fever and unexpected weight change. HENT: Negative for congestion. Respiratory: Positive for chest tightness and shortness of breath. Negative for cough and wheezing. Cardiovascular: Negative for chest pain, palpitations and leg swelling. Gastrointestinal: Negative for abdominal distention and abdominal pain. Genitourinary: Negative for dysuria. Skin: Positive for rash. Negative for color change and wound. Neurological: Negative for dizziness, tremors, weakness, light-headedness, numbness and headaches. Psychiatric/Behavioral: Negative for dysphoric mood and sleep disturbance. The patient is nervous/anxious (mild).       Past Medical History:   Diagnosis Date    Acne     Herniated disc     L4-L5    Hip pain, left     Hyperlipidemia     TMJ (temporomandibular joint disorder) Past Surgical History:   Procedure Laterality Date    BUNIONECTOMY Right 2019    HYSTERECTOMY  2000    THYROID SURGERY  1968     Social History     Socioeconomic History    Marital status:      Spouse name: Not on file    Number of children: Not on file    Years of education: Not on file    Highest education level: Not on file   Occupational History    Not on file   Social Needs    Financial resource strain: Not on file    Food insecurity     Worry: Not on file     Inability: Not on file    Transportation needs     Medical: Not on file     Non-medical: Not on file   Tobacco Use    Smoking status: Former Smoker     Packs/day: 0.25     Years: 20.00     Pack years: 5.00     Types: Cigarettes     Quit date:      Years since quittin.9    Smokeless tobacco: Never Used   Substance and Sexual Activity    Alcohol use: No    Drug use: No    Sexual activity: Yes   Lifestyle    Physical activity     Days per week: Not on file     Minutes per session: Not on file    Stress: Not on file   Relationships    Social connections     Talks on phone: Not on file     Gets together: Not on file     Attends Restorationist service: Not on file     Active member of club or organization: Not on file     Attends meetings of clubs or organizations: Not on file     Relationship status: Not on file    Intimate partner violence     Fear of current or ex partner: Not on file     Emotionally abused: Not on file     Physically abused: Not on file     Forced sexual activity: Not on file   Other Topics Concern    Not on file   Social History Narrative    Not on file     Family History   Problem Relation Age of Onset    Heart Disease Mother     High Blood Pressure Father     Heart Disease Father      Allergies   Allergen Reactions    Augmentin [Amoxicillin-Pot Clavulanate] Diarrhea     Current Outpatient Medications   Medication Sig Dispense Refill    traZODone (DESYREL) 100 MG tablet TAKE ONE TABLET BY MOUTH NIGHTLY 30 tablet 5    dexamethasone (DECADRON) 6 MG tablet Take 1 tablet by mouth daily (with breakfast) for 5 days 5 tablet 0    clobetasol (TEMOVATE) 0.05 % cream Apply topically 2 times daily. 60 g 1    selenium sulfide (SELSUN) 2.5 % lotion Apply topically to back nightly, rinse off in morning. 118 mL 1    meloxicam (MOBIC) 15 MG tablet TAKE ONE TABLET BY MOUTH EVERY DAY 30 tablet 0    rosuvastatin (CRESTOR) 5 MG tablet Take 1 tablet by mouth daily 30 tablet 11    vitamin D (ERGOCALCIFEROL) 1.25 MG (12572 UT) CAPS capsule Take 1 capsule by mouth once a week 4 capsule 11    solifenacin (VESICARE) 10 MG tablet TAKE ONE TABLET BY MOUTH EVERY DAY 30 tablet 5    escitalopram (LEXAPRO) 20 MG tablet TAKE ONE TABLET BY MOUTH EVERY DAY 30 tablet 11    albuterol sulfate (PROAIR RESPICLICK) 049 (90 Base) MCG/ACT aerosol powder inhalation Inhale 2 puffs into the lungs every 6 hours as needed for Wheezing or Shortness of Breath 1 Inhaler 5    hydrochlorothiazide (HYDRODIURIL) 25 MG tablet Take 1 tablet by mouth 2 times daily 60 tablet 11    valACYclovir (VALTREX) 500 MG tablet Take 1 tablet by mouth 2 times daily as needed 20 tablet 0     No current facility-administered medications for this visit. PMH, Surgical Hx, Family Hx, and Social Hx reviewed and updated. Health Maintenance reviewed. Objective  Vitals:    12/14/20 0854   BP: 112/72   Site: Left Upper Arm   Position: Sitting   Cuff Size: Large Adult   Pulse: 65   Resp: 16   Temp: 96.8 °F (36 °C)   TempSrc: Temporal   SpO2: 98%   Weight: 249 lb 3.2 oz (113 kg)   Height: 5' 3\" (1.6 m)     BP Readings from Last 3 Encounters:   12/14/20 112/72   08/14/20 112/72   07/23/20 132/76     Wt Readings from Last 3 Encounters:   12/14/20 249 lb 3.2 oz (113 kg)   08/14/20 244 lb 12.8 oz (111 kg)   07/23/20 239 lb (108.4 kg)     Physical Exam  Vitals signs reviewed. Constitutional:       General: She is not in acute distress. Appearance: She is obese.  She is not ill-appearing, toxic-appearing or diaphoretic. HENT:      Head: Normocephalic and atraumatic. Right Ear: External ear normal.      Left Ear: External ear normal.      Nose: Nose normal.   Cardiovascular:      Rate and Rhythm: Normal rate and regular rhythm. Pulmonary:      Effort: Pulmonary effort is normal.   Skin:     General: Skin is warm and dry. Coloration: Skin is not pale. Findings: Rash present. No erythema. Neurological:      Mental Status: She is alert. Assessment & Plan   Nimesh Burk was seen today for rash and shortness of breath. Diagnoses and all orders for this visit:    Mild intermittent asthma without complication    SOB (shortness of breath)  -     XR CHEST (2 VW); Future  -     dexamethasone (DECADRON) 6 MG tablet; Take 1 tablet by mouth daily (with breakfast) for 5 days  -     Comprehensive Metabolic Panel; Future    History of 2019 novel coronavirus disease (COVID-19)  -     XR CHEST (2 VW); Future  -     dexamethasone (DECADRON) 6 MG tablet; Take 1 tablet by mouth daily (with breakfast) for 5 days  -     Comprehensive Metabolic Panel; Future    Pruritic rash  -     clobetasol (TEMOVATE) 0.05 % cream; Apply topically 2 times daily. Anxiety and depression  -     traZODone (DESYREL) 100 MG tablet; TAKE ONE TABLET BY MOUTH NIGHTLY    Tinea versicolor  -     selenium sulfide (SELSUN) 2.5 % lotion; Apply topically to back nightly, rinse off in morning. 6 week follow up with me after patch test.  CXR for residual symptoms. 5 day decadron given continued tightness. Encouraged albuterol inhaler for the next 2-3 days. Orders Placed This Encounter   Procedures    XR CHEST (2 VW)     Standing Status:   Future     Standing Expiration Date:   12/14/2021     Order Specific Question:   Reason for exam:     Answer:   history of covid 19 4 weeks ago; residual SOB.     Comprehensive Metabolic Panel     Standing Status:   Future     Standing Expiration Date:

## 2020-12-15 RX ORDER — DOXYCYCLINE 100 MG/1
100 CAPSULE ORAL 2 TIMES DAILY
Qty: 20 CAPSULE | Refills: 0 | Status: SHIPPED | OUTPATIENT
Start: 2020-12-15 | End: 2021-04-14

## 2021-01-26 ENCOUNTER — OFFICE VISIT (OUTPATIENT)
Dept: FAMILY MEDICINE CLINIC | Age: 66
End: 2021-01-26
Payer: COMMERCIAL

## 2021-01-26 VITALS
TEMPERATURE: 98 F | WEIGHT: 250 LBS | DIASTOLIC BLOOD PRESSURE: 80 MMHG | RESPIRATION RATE: 18 BRPM | OXYGEN SATURATION: 98 % | HEIGHT: 63 IN | SYSTOLIC BLOOD PRESSURE: 110 MMHG | HEART RATE: 72 BPM | BODY MASS INDEX: 44.3 KG/M2

## 2021-01-26 DIAGNOSIS — L24.3 IRRITANT CONTACT DERMATITIS DUE TO COSMETICS: ICD-10-CM

## 2021-01-26 DIAGNOSIS — R10.11 RUQ PAIN: Primary | ICD-10-CM

## 2021-01-26 PROBLEM — R06.02 SOB (SHORTNESS OF BREATH): Status: RESOLVED | Noted: 2020-12-14 | Resolved: 2021-01-26

## 2021-01-26 PROBLEM — L28.2 PRURITIC RASH: Status: RESOLVED | Noted: 2020-07-23 | Resolved: 2021-01-26

## 2021-01-26 PROCEDURE — G8417 CALC BMI ABV UP PARAM F/U: HCPCS | Performed by: PHYSICIAN ASSISTANT

## 2021-01-26 PROCEDURE — 99214 OFFICE O/P EST MOD 30 MIN: CPT | Performed by: PHYSICIAN ASSISTANT

## 2021-01-26 PROCEDURE — G8427 DOCREV CUR MEDS BY ELIG CLIN: HCPCS | Performed by: PHYSICIAN ASSISTANT

## 2021-01-26 PROCEDURE — G8482 FLU IMMUNIZE ORDER/ADMIN: HCPCS | Performed by: PHYSICIAN ASSISTANT

## 2021-01-26 PROCEDURE — 3017F COLORECTAL CA SCREEN DOC REV: CPT | Performed by: PHYSICIAN ASSISTANT

## 2021-01-26 PROCEDURE — 1036F TOBACCO NON-USER: CPT | Performed by: PHYSICIAN ASSISTANT

## 2021-01-26 RX ORDER — CETIRIZINE HYDROCHLORIDE 10 MG/1
10 TABLET ORAL DAILY
Qty: 90 TABLET | Refills: 1 | Status: SHIPPED | OUTPATIENT
Start: 2021-01-26 | End: 2021-04-14 | Stop reason: ALTCHOICE

## 2021-01-26 ASSESSMENT — ENCOUNTER SYMPTOMS
CHEST TIGHTNESS: 0
WHEEZING: 0
SHORTNESS OF BREATH: 0
ABDOMINAL DISTENTION: 0
VOMITING: 0
COUGH: 0
ABDOMINAL PAIN: 1
RECTAL PAIN: 0
COLOR CHANGE: 0
NAUSEA: 0
DIARRHEA: 0
CONSTIPATION: 0

## 2021-01-26 NOTE — PROGRESS NOTES
Subjective  Megha Paceahsan, 60 y.o. female presents today with:  Chief Complaint   Patient presents with    Rash     Left leg rash     Abdominal Pain     RUQ     HPI  Ev Room is in the office today for follow up. Last OV with me: 12/14/2020    LLE rash. Under the care of Dr. Beti Salguero. Was taken off of certain products due to allergy/positive patch testing. She has been using new products with no relief of rash. When patient was taking decadron for COVID, rash was completely resolved. She is continuing to use clobetasol ointment. RUQ pain. C/o RUQ pain/discomfort after eating. No known prior GBD. Pain is \"up, under rib cage\" with radiation of pain into R upper back/shoulder region. Denies N/V/D, changes to stool. Has taken pepcid OTC with mild response/relief. Obesity. Would like to lose weight. No exercise regimen/routine at this time. Has been successful in the past with weight watchers program.      Review of Systems   Constitutional: Negative for activity change, appetite change, chills, diaphoresis, fatigue, fever and unexpected weight change. HENT: Negative for congestion. Respiratory: Negative for cough, chest tightness, shortness of breath and wheezing. Cardiovascular: Negative for chest pain, palpitations and leg swelling. Gastrointestinal: Positive for abdominal pain. Negative for abdominal distention, constipation, diarrhea, nausea, rectal pain and vomiting. Genitourinary: Negative for dysuria. Skin: Positive for rash. Negative for color change and wound. Neurological: Negative for dizziness, tremors, weakness, light-headedness, numbness and headaches. Psychiatric/Behavioral: Negative for dysphoric mood and sleep disturbance. The patient is not nervous/anxious.       Past Medical History:   Diagnosis Date    Acne     Herniated disc     L4-L5    Hip pain, left     Hyperlipidemia     TMJ (temporomandibular joint disorder)      Past Surgical History:   Procedure Laterality Date    BUNIONECTOMY Right 2019    HYSTERECTOMY  2000    THYROID SURGERY  1968     Social History     Socioeconomic History    Marital status:      Spouse name: Not on file    Number of children: Not on file    Years of education: Not on file    Highest education level: Not on file   Occupational History    Not on file   Social Needs    Financial resource strain: Not on file    Food insecurity     Worry: Not on file     Inability: Not on file    Transportation needs     Medical: Not on file     Non-medical: Not on file   Tobacco Use    Smoking status: Former Smoker     Packs/day: 0.25     Years: 20.00     Pack years: 5.00     Types: Cigarettes     Quit date:      Years since quittin.0    Smokeless tobacco: Never Used   Substance and Sexual Activity    Alcohol use: No    Drug use: No    Sexual activity: Yes   Lifestyle    Physical activity     Days per week: Not on file     Minutes per session: Not on file    Stress: Not on file   Relationships    Social connections     Talks on phone: Not on file     Gets together: Not on file     Attends Church service: Not on file     Active member of club or organization: Not on file     Attends meetings of clubs or organizations: Not on file     Relationship status: Not on file    Intimate partner violence     Fear of current or ex partner: Not on file     Emotionally abused: Not on file     Physically abused: Not on file     Forced sexual activity: Not on file   Other Topics Concern    Not on file   Social History Narrative    Not on file     Family History   Problem Relation Age of Onset    Heart Disease Mother     High Blood Pressure Father     Heart Disease Father      Allergies   Allergen Reactions    Augmentin [Amoxicillin-Pot Clavulanate] Diarrhea    Cinnamic Acide     Balsam Rash     equivocal patch test     Linalool Rash     Positive patch test    Methylisothiazolinone Rash     Positive patch test    Other Rash     Positive patch test     Current Outpatient Medications   Medication Sig Dispense Refill    cetirizine (ZYRTEC) 10 MG tablet Take 1 tablet by mouth daily 90 tablet 1    doxycycline monohydrate (MONODOX) 100 MG capsule Take 1 capsule by mouth 2 times daily 20 capsule 0    traZODone (DESYREL) 100 MG tablet TAKE ONE TABLET BY MOUTH NIGHTLY 30 tablet 5    clobetasol (TEMOVATE) 0.05 % cream Apply topically 2 times daily. 45 g 1    meloxicam (MOBIC) 15 MG tablet TAKE ONE TABLET BY MOUTH EVERY DAY 30 tablet 0    rosuvastatin (CRESTOR) 5 MG tablet Take 1 tablet by mouth daily 30 tablet 11    vitamin D (ERGOCALCIFEROL) 1.25 MG (03224 UT) CAPS capsule Take 1 capsule by mouth once a week 4 capsule 11    solifenacin (VESICARE) 10 MG tablet TAKE ONE TABLET BY MOUTH EVERY DAY 30 tablet 5    escitalopram (LEXAPRO) 20 MG tablet TAKE ONE TABLET BY MOUTH EVERY DAY 30 tablet 11    albuterol sulfate (PROAIR RESPICLICK) 052 (90 Base) MCG/ACT aerosol powder inhalation Inhale 2 puffs into the lungs every 6 hours as needed for Wheezing or Shortness of Breath 1 Inhaler 5    hydrochlorothiazide (HYDRODIURIL) 25 MG tablet Take 1 tablet by mouth 2 times daily 60 tablet 11    valACYclovir (VALTREX) 500 MG tablet Take 1 tablet by mouth 2 times daily as needed 20 tablet 0     No current facility-administered medications for this visit. PMH, Surgical Hx, Family Hx, and Social Hx reviewed and updated. Health Maintenance reviewed. Objective  Vitals:    01/26/21 0905   BP: 110/80   Pulse: 72   Resp: 18   Temp: 98 °F (36.7 °C)   TempSrc: Temporal   SpO2: 98%   Weight: 250 lb (113.4 kg)   Height: 5' 3\" (1.6 m)     BP Readings from Last 3 Encounters:   01/26/21 110/80   12/14/20 112/72   08/14/20 112/72     Wt Readings from Last 3 Encounters:   01/26/21 250 lb (113.4 kg)   12/14/20 249 lb 3.2 oz (113 kg)   08/14/20 244 lb 12.8 oz (111 kg)     Physical Exam  Vitals signs reviewed.    Constitutional: General: She is not in acute distress. Appearance: She is obese. She is not ill-appearing, toxic-appearing or diaphoretic. HENT:      Head: Normocephalic and atraumatic. Right Ear: External ear normal.      Left Ear: External ear normal.      Nose: Nose normal.   Cardiovascular:      Rate and Rhythm: Normal rate and regular rhythm. Pulmonary:      Effort: Pulmonary effort is normal.   Abdominal:      General: Bowel sounds are normal.      Palpations: Abdomen is soft. Tenderness: There is no abdominal tenderness. Skin:     General: Skin is warm and dry. Coloration: Skin is not pale. Findings: Rash present. No erythema. Neurological:      Mental Status: She is alert. Assessment & Plan   Concepcion Kc was seen today for rash and abdominal pain. Diagnoses and all orders for this visit:    RUQ pain  -     US GALLBLADDER RUQ; Future  -     NM HEPATOBILIARY SCAN W EJECTION FRACTION; Future    Irritant contact dermatitis due to cosmetics  -     cetirizine (ZYRTEC) 10 MG tablet; Take 1 tablet by mouth daily    BMI 40.0-44.9, adult ([address]    Rule out GBD with imaging. Trial zyrtec for recurrent/chronic rash. Discussed weight loss options with adding exercise. 3 month follow up with me. Orders Placed This Encounter   Procedures    US GALLBLADDER RUQ     Standing Status:   Future     Standing Expiration Date:   1/26/2022     Order Specific Question:   Reason for exam:     Answer:   RUQ pain, post-prandial, rule out GBD    NM HEPATOBILIARY SCAN W EJECTION FRACTION     Standing Status:   Future     Standing Expiration Date:   1/26/2022     Order Specific Question:   Reason for exam:     Answer:   RUQ pain, post-prandial.     Orders Placed This Encounter   Medications    cetirizine (ZYRTEC) 10 MG tablet     Sig: Take 1 tablet by mouth daily     Dispense:  90 tablet     Refill:  1     There are no discontinued medications. No follow-ups on file.       Reviewed with the patient: current clinical status, medications, activities and diet. Side effects, adverse effects of the medication prescribed today, as well as treatment plan/ rationale and result expectations have been discussed with the patient who expresses understanding and desires to proceed. Close follow up to evaluate treatment results and for coordination of care. I have reviewed the patient's medical history in detail and updated the computerized patient record.     Milagros Huang PA-C

## 2021-02-26 ENCOUNTER — HOSPITAL ENCOUNTER (OUTPATIENT)
Dept: NUCLEAR MEDICINE | Age: 66
Discharge: HOME | End: 2021-02-28
Payer: COMMERCIAL

## 2021-02-26 ENCOUNTER — HOSPITAL ENCOUNTER (OUTPATIENT)
Dept: ULTRASOUND IMAGING | Age: 66
Discharge: HOME | End: 2021-02-28
Payer: COMMERCIAL

## 2021-02-26 DIAGNOSIS — R10.11 RUQ PAIN: ICD-10-CM

## 2021-02-26 PROCEDURE — 3430000000 HC RX DIAGNOSTIC RADIOPHARMACEUTICAL

## 2021-02-26 PROCEDURE — 76705 ECHO EXAM OF ABDOMEN: CPT

## 2021-02-26 PROCEDURE — A9537 TC99M MEBROFENIN: HCPCS | Performed by: PHYSICIAN ASSISTANT

## 2021-02-26 PROCEDURE — A9537 TC99M MEBROFENIN: HCPCS

## 2021-02-26 PROCEDURE — 3430000000 HC RX DIAGNOSTIC RADIOPHARMACEUTICAL: Performed by: PHYSICIAN ASSISTANT

## 2021-02-26 PROCEDURE — 78227 HEPATOBIL SYST IMAGE W/DRUG: CPT

## 2021-02-26 RX ADMIN — Medication 7.4 MILLICURIE: at 08:15

## 2021-03-01 ENCOUNTER — PATIENT MESSAGE (OUTPATIENT)
Dept: FAMILY MEDICINE CLINIC | Age: 66
End: 2021-03-01

## 2021-03-01 DIAGNOSIS — R10.11 RUQ PAIN: ICD-10-CM

## 2021-03-01 DIAGNOSIS — L24.3 IRRITANT CONTACT DERMATITIS DUE TO COSMETICS: ICD-10-CM

## 2021-03-01 DIAGNOSIS — L28.2 PRURITIC RASH: Primary | ICD-10-CM

## 2021-03-01 RX ORDER — PANTOPRAZOLE SODIUM 40 MG/1
40 TABLET, DELAYED RELEASE ORAL
Qty: 90 TABLET | Refills: 1 | Status: SHIPPED | OUTPATIENT
Start: 2021-03-01 | End: 2021-06-21

## 2021-03-01 RX ORDER — LEVOCETIRIZINE DIHYDROCHLORIDE 5 MG/1
5 TABLET, FILM COATED ORAL NIGHTLY
Qty: 90 TABLET | Refills: 1 | Status: SHIPPED | OUTPATIENT
Start: 2021-03-01 | End: 2022-01-21

## 2021-03-01 NOTE — TELEPHONE ENCOUNTER
From: Radha Bruce  To: Marv Martin PA-C  Sent: 3/1/2021 11:13 AM EST  Subject: Test Results Question    Hi Milagros  I have a few questions regarding my test results on my gallbladder. It looks like something is going on with it-stones? ?? Before we decide the next step to deal with it, I need something for the pain. I had an old script for Protonix which really helped, unfortunately Im out of it. Could I get another script for that? I do have an appointment with you in April. Also, the last time I saw you, my leg rash was in full bloom and you said to continue with the Zyrtec daily, which I have done. Im also taking xyzal at hs. Xyzal helps at night to sleep w/o itching, but my rash is worse!!! Could I also get a refill on that? Just wondering if this could be some kind of arthritic rash? Grasping at straws!   [employer] [employer]

## 2021-03-29 ENCOUNTER — TELEPHONE (OUTPATIENT)
Dept: SURGERY | Age: 66
End: 2021-03-29

## 2021-03-29 ENCOUNTER — PREP FOR PROCEDURE (OUTPATIENT)
Dept: SURGERY | Age: 66
End: 2021-03-29

## 2021-03-29 ENCOUNTER — OFFICE VISIT (OUTPATIENT)
Dept: SURGERY | Age: 66
End: 2021-03-29
Payer: COMMERCIAL

## 2021-03-29 VITALS
BODY MASS INDEX: 45.89 KG/M2 | HEIGHT: 63 IN | DIASTOLIC BLOOD PRESSURE: 82 MMHG | SYSTOLIC BLOOD PRESSURE: 134 MMHG | WEIGHT: 259 LBS | TEMPERATURE: 97.3 F

## 2021-03-29 DIAGNOSIS — R10.11 RIGHT UPPER QUADRANT ABDOMINAL PAIN: Primary | ICD-10-CM

## 2021-03-29 DIAGNOSIS — Z01.818 PRE-OP TESTING: Primary | ICD-10-CM

## 2021-03-29 PROCEDURE — 3017F COLORECTAL CA SCREEN DOC REV: CPT | Performed by: SURGERY

## 2021-03-29 PROCEDURE — G8482 FLU IMMUNIZE ORDER/ADMIN: HCPCS | Performed by: SURGERY

## 2021-03-29 PROCEDURE — 99204 OFFICE O/P NEW MOD 45 MIN: CPT | Performed by: SURGERY

## 2021-03-29 PROCEDURE — G8427 DOCREV CUR MEDS BY ELIG CLIN: HCPCS | Performed by: SURGERY

## 2021-03-29 PROCEDURE — G8417 CALC BMI ABV UP PARAM F/U: HCPCS | Performed by: SURGERY

## 2021-03-29 PROCEDURE — 1036F TOBACCO NON-USER: CPT | Performed by: SURGERY

## 2021-03-29 ASSESSMENT — ENCOUNTER SYMPTOMS
RECTAL PAIN: 0
VOMITING: 1
ABDOMINAL PAIN: 1
RHINORRHEA: 0
COLOR CHANGE: 0
BLOOD IN STOOL: 0
CHEST TIGHTNESS: 0
ABDOMINAL DISTENTION: 0
NAUSEA: 1
SHORTNESS OF BREATH: 0
ALLERGIC/IMMUNOLOGIC NEGATIVE: 1

## 2021-03-29 NOTE — PROGRESS NOTES
Vi Moses (:  1955) is a 60 y.o. female, New patient, here for evaluation of the following chief complaint(s):  New Patient (np, gall bladder sludge)      ASSESSMENT/PLAN:    Right upper quadrant abdominal pain related to eating and GB ultrasound with stones or sludge      Cholecystectomy with Cholangiogram    The patient was given the options of therapy. I went over in great detail with her her x-ray results and the options of therapy. I have given her the options of GI consult versus observation versus surgical intervention. Her symptoms sound like gallbladder symptoms but I cannot guarantee her that removing her gallbladder will relieve her of all of her symptoms. The procedure of laparascopic as well as the open procedure were discussed. I explained that a cholangiogram with dye would be attempted but not always done. The risks and complications including but not exclusive to include infection, blood loss, bile ductal damage, bile leakage, retained stones and non resolution of pain . If any of these were to occur it may require an additional surgery to correct. The patient understands and agrees to the procedure. The expected convalescence was discussed. The patient was counseled at length about the risks of chaparrita Covid-19 in the perioperative period and any recovery window from their procedure. The patient was made aware that chaparrita Covid-19  may worsen their prognosis for recovering from their procedure  and lend to a higher morbidity and/or mortality risk. The patient was given the options of postponing their procedure. All material risks, benefits, and alternatives were discussed. The patient does wish to proceed with the procedure at this time.       Please note this report has been partially produced using speech recognition software and may cause contain errors related to that system including grammar, punctuation and spelling as well as words and phrases that may seem inappropriate. If there are questions or concerns please feel free to contact me to clarify      SUBJECTIVE/OBJECTIVE:  NABOR Ramirez is a 60 y.o. female who is being seen at the request of [facility]  for possible gallbladder disease. The symptoms include intermittent right upper quadrant pain, nausea and vomiting. The patient denies juandice and/or dark urine. The symptoms were first noticed 6 months ago. The pain is rated as a 8 in intensity. The symptoms are increasing with time. The patient does have a family history of gallbladder disease. Ultrasound showed gallstones vs sludge  Hida Scan was done and normal with normal EF. CAT scan was not done. She is not on any anticoagulants. Review of Systems   Constitutional: Negative for activity change, appetite change and unexpected weight change. HENT: Negative for congestion, nosebleeds, rhinorrhea and sneezing. Eyes: Negative for visual disturbance. Respiratory: Negative for chest tightness and shortness of breath. Cardiovascular: Negative for chest pain and leg swelling. Gastrointestinal: Positive for abdominal pain, nausea and vomiting. Negative for abdominal distention, blood in stool and rectal pain. Endocrine: Negative. Genitourinary: Negative for difficulty urinating. Musculoskeletal: Negative. Skin: Negative for color change. Allergic/Immunologic: Negative. Neurological: Negative for seizures, light-headedness, numbness and headaches. Hematological: Does not bruise/bleed easily. Psychiatric/Behavioral: Negative for sleep disturbance.      Past Medical History:   Diagnosis Date    Acne     Herniated disc     L4-L5    Hip pain, left     Hyperlipidemia     TMJ (temporomandibular joint disorder)      Past Surgical History:   Procedure Laterality Date    BUNIONECTOMY Right 09/2019    HYSTERECTOMY  2000    THYROID SURGERY  1968     Social History     Tobacco Use    Smoking status: Former Smoker     Packs/day: 0.25     Years: 20.00     Pack years: 5.00     Types: Cigarettes     Quit date: 0     Years since quittin.2    Smokeless tobacco: Never Used   Substance Use Topics    Alcohol use: No    Drug use: No     Family History   Problem Relation Age of Onset    Heart Disease Mother     High Blood Pressure Father     Heart Disease Father      Augmentin [amoxicillin-pot clavulanate], Cinnamic acide, Balsam, Linalool, Methylisothiazolinone, and Other  Allergies   Allergen Reactions    Augmentin [Amoxicillin-Pot Clavulanate] Diarrhea    Cinnamic Acide     Balsam Rash     equivocal patch test     Linalool Rash     Positive patch test    Methylisothiazolinone Rash     Positive patch test    Other Rash     Positive patch test     Current Outpatient Medications   Medication Sig Dispense Refill    solifenacin (VESICARE) 10 MG tablet TAKE ONE TABLET BY MOUTH EVERY DAY 30 tablet 5    escitalopram (LEXAPRO) 20 MG tablet TAKE ONE TABLET BY MOUTH EVERY DAY 30 tablet 11    pantoprazole (PROTONIX) 40 MG tablet Take 1 tablet by mouth every morning (before breakfast) 90 tablet 1    levocetirizine (XYZAL) 5 MG tablet Take 1 tablet by mouth nightly 90 tablet 1    cetirizine (ZYRTEC) 10 MG tablet Take 1 tablet by mouth daily 90 tablet 1    doxycycline monohydrate (MONODOX) 100 MG capsule Take 1 capsule by mouth 2 times daily 20 capsule 0    traZODone (DESYREL) 100 MG tablet TAKE ONE TABLET BY MOUTH NIGHTLY 30 tablet 5    clobetasol (TEMOVATE) 0.05 % cream Apply topically 2 times daily.  45 g 1    meloxicam (MOBIC) 15 MG tablet TAKE ONE TABLET BY MOUTH EVERY DAY 30 tablet 0    rosuvastatin (CRESTOR) 5 MG tablet Take 1 tablet by mouth daily 30 tablet 11    vitamin D (ERGOCALCIFEROL) 1.25 MG (01412 UT) CAPS capsule Take 1 capsule by mouth once a week 4 capsule 11    albuterol sulfate (PROAIR RESPICLICK) 998 (90 Base) MCG/ACT aerosol powder inhalation Inhale 2 puffs into the lungs every 6 hours as needed for Wheezing or Shortness of Breath 1 Inhaler 5    hydrochlorothiazide (HYDRODIURIL) 25 MG tablet Take 1 tablet by mouth 2 times daily 60 tablet 11    valACYclovir (VALTREX) 500 MG tablet Take 1 tablet by mouth 2 times daily as needed 20 tablet 0     No current facility-administered medications for this visit. /82   Temp 97.3 °F (36.3 °C) (Temporal)   Ht 5' 3\" (1.6 m)   Wt 259 lb (117.5 kg)   BMI 45.88 kg/m²     Physical Exam  Constitutional:       General: She is not in acute distress. Appearance: Normal appearance. HENT:      Mouth/Throat:      Mouth: Mucous membranes are moist.      Pharynx: Oropharynx is clear. Eyes:      Pupils: Pupils are equal, round, and reactive to light. Neck:      Comments: Neck is supple with out masses, no thyromegaly, trachea midline  Cardiovascular:      Rate and Rhythm: Normal rate. Heart sounds: No murmur. Pulmonary:      Effort: Pulmonary effort is normal. No respiratory distress. Breath sounds: Normal breath sounds. Abdominal:      Palpations: Abdomen is soft. There is no hepatomegaly or splenomegaly. Tenderness: There is abdominal tenderness in the right upper quadrant and epigastric area. Hernia: No hernia is present. Musculoskeletal:      Comments: Normal gait   Skin:     Findings: No bruising, lesion or rash. Neurological:      Mental Status: She is alert and oriented to person, place, and time. Psychiatric:         Mood and Affect: Mood normal.         Judgment: Judgment normal.       An electronic signature was used to authenticate this note.     --Herb Khan MD

## 2021-03-30 NOTE — H&P
Toby Marshall (:  1955) is a 60 y.o. female, New patient, here for evaluation of the following chief complaint(s):  New Patient (np, gall bladder sludge)      ASSESSMENT/PLAN:    Right upper quadrant abdominal pain with x-ray suggestive of stones or sludge. Cholecystectomy with Cholangiogram    The patient was given the options of therapy. I went over in great detail with her her x-ray results and the options of therapy. I have given her the options of GI consult versus observation versus surgical intervention. Her symptoms sound like gallbladder symptoms but I cannot guarantee her that removing her gallbladder will relieve her of all of her symptoms. The procedure of laparascopic as well as the open procedure were discussed. I explained that a cholangiogram with dye would be attempted but not always done. The risks and complications including but not exclusive to include infection, blood loss, bile ductal damage, bile leakage, retained stones and non resolution of pain . If any of these were to occur it may require an additional surgery to correct. The patient understands and agrees to the procedure. The expected convalescence was discussed. The patient was counseled at length about the risks of chaparrita Covid-19 in the perioperative period and any recovery window from their procedure. The patient was made aware that chaparrita Covid-19  may worsen their prognosis for recovering from their procedure  and lend to a higher morbidity and/or mortality risk. The patient was given the options of postponing their procedure. All material risks, benefits, and alternatives were discussed. The patient does wish to proceed with the procedure at this time. Please note this report has been partially produced using speech recognition software and may cause contain errors related to that system including grammar, punctuation and spelling as well as words and phrases that may seem inappropriate.  If there are questions or concerns please feel free to contact me to clarify      SUBJECTIVE/OBJECTIVE:  NABOR Toth is a 60 y.o. female who is being seen at the request of [facility]  for possible gallbladder disease. The symptoms include intermittent right upper quadrant pain, nausea and vomiting. The patient denies juandice and/or dark urine. The symptoms were first noticed 6 months ago. The pain is rated as a 8 in intensity. The symptoms are increasing with time. The patient does have a family history of gallbladder disease. Ultrasound showed gallstones vs sludge  Hida Scan was done and normal with normal EF. CAT scan was not done. She is not on any anticoagulants. Review of Systems   Constitutional: Negative for activity change, appetite change and unexpected weight change. HENT: Negative for congestion, nosebleeds, rhinorrhea and sneezing. Eyes: Negative for visual disturbance. Respiratory: Negative for chest tightness and shortness of breath. Cardiovascular: Negative for chest pain and leg swelling. Gastrointestinal: Positive for abdominal pain, nausea and vomiting. Negative for abdominal distention, blood in stool and rectal pain. Endocrine: Negative. Genitourinary: Negative for difficulty urinating. Musculoskeletal: Negative. Skin: Negative for color change. Allergic/Immunologic: Negative. Neurological: Negative for seizures, light-headedness, numbness and headaches. Hematological: Does not bruise/bleed easily. Psychiatric/Behavioral: Negative for sleep disturbance.      Past Medical History:   Diagnosis Date    Acne     Herniated disc     L4-L5    Hip pain, left     Hyperlipidemia     TMJ (temporomandibular joint disorder)      Past Surgical History:   Procedure Laterality Date    BUNIONECTOMY Right 09/2019    HYSTERECTOMY  2000    THYROID SURGERY  1968     Social History     Tobacco Use    Smoking status: Former Smoker     Packs/day: 0.25     Years: 20.00     Pack years: 5.00     Types: Cigarettes     Quit date: 0     Years since quittin.2    Smokeless tobacco: Never Used   Substance Use Topics    Alcohol use: No    Drug use: No     Family History   Problem Relation Age of Onset    Heart Disease Mother     High Blood Pressure Father     Heart Disease Father      Augmentin [amoxicillin-pot clavulanate], Cinnamic acide, Balsam, Linalool, Methylisothiazolinone, and Other  Allergies   Allergen Reactions    Augmentin [Amoxicillin-Pot Clavulanate] Diarrhea    Cinnamic Acide     Balsam Rash     equivocal patch test     Linalool Rash     Positive patch test    Methylisothiazolinone Rash     Positive patch test    Other Rash     Positive patch test     Current Outpatient Medications   Medication Sig Dispense Refill    solifenacin (VESICARE) 10 MG tablet TAKE ONE TABLET BY MOUTH EVERY DAY 30 tablet 5    escitalopram (LEXAPRO) 20 MG tablet TAKE ONE TABLET BY MOUTH EVERY DAY 30 tablet 11    pantoprazole (PROTONIX) 40 MG tablet Take 1 tablet by mouth every morning (before breakfast) 90 tablet 1    levocetirizine (XYZAL) 5 MG tablet Take 1 tablet by mouth nightly 90 tablet 1    cetirizine (ZYRTEC) 10 MG tablet Take 1 tablet by mouth daily 90 tablet 1    doxycycline monohydrate (MONODOX) 100 MG capsule Take 1 capsule by mouth 2 times daily 20 capsule 0    traZODone (DESYREL) 100 MG tablet TAKE ONE TABLET BY MOUTH NIGHTLY 30 tablet 5    clobetasol (TEMOVATE) 0.05 % cream Apply topically 2 times daily.  45 g 1    meloxicam (MOBIC) 15 MG tablet TAKE ONE TABLET BY MOUTH EVERY DAY 30 tablet 0    rosuvastatin (CRESTOR) 5 MG tablet Take 1 tablet by mouth daily 30 tablet 11    vitamin D (ERGOCALCIFEROL) 1.25 MG (93470 UT) CAPS capsule Take 1 capsule by mouth once a week 4 capsule 11    albuterol sulfate (PROAIR RESPICLICK) 590 (90 Base) MCG/ACT aerosol powder inhalation Inhale 2 puffs into the lungs every 6 hours as needed for Wheezing or Shortness of Breath 1 Inhaler 5    hydrochlorothiazide (HYDRODIURIL) 25 MG tablet Take 1 tablet by mouth 2 times daily 60 tablet 11    valACYclovir (VALTREX) 500 MG tablet Take 1 tablet by mouth 2 times daily as needed 20 tablet 0     No current facility-administered medications for this visit. /82   Temp 97.3 °F (36.3 °C) (Temporal)   Ht 5' 3\" (1.6 m)   Wt 259 lb (117.5 kg)   BMI 45.88 kg/m²     Physical Exam  Constitutional:       General: She is not in acute distress. Appearance: Normal appearance. HENT:      Mouth/Throat:      Mouth: Mucous membranes are moist.      Pharynx: Oropharynx is clear. Eyes:      Pupils: Pupils are equal, round, and reactive to light. Neck:      Comments: Neck is supple with out masses, no thyromegaly, trachea midline  Cardiovascular:      Rate and Rhythm: Normal rate. Heart sounds: No murmur. Pulmonary:      Effort: Pulmonary effort is normal. No respiratory distress. Breath sounds: Normal breath sounds. Abdominal:      Palpations: Abdomen is soft. There is no hepatomegaly or splenomegaly. Tenderness: There is abdominal tenderness in the right upper quadrant and epigastric area. Hernia: No hernia is present. Musculoskeletal:      Comments: Normal gait   Skin:     Findings: No bruising, lesion or rash. Neurological:      Mental Status: She is alert and oriented to person, place, and time. Psychiatric:         Mood and Affect: Mood normal.         Judgment: Judgment normal.       An electronic signature was used to authenticate this note.     --Ray Siegel MD

## 2021-03-30 NOTE — H&P (VIEW-ONLY)
Jameel Fallon (:  1955) is a 60 y.o. female, New patient, here for evaluation of the following chief complaint(s):  New Patient (np, gall bladder sludge)      ASSESSMENT/PLAN:    Right upper quadrant abdominal pain with x-ray suggestive of stones or sludge. Cholecystectomy with Cholangiogram    The patient was given the options of therapy. I went over in great detail with her her x-ray results and the options of therapy. I have given her the options of GI consult versus observation versus surgical intervention. Her symptoms sound like gallbladder symptoms but I cannot guarantee her that removing her gallbladder will relieve her of all of her symptoms. The procedure of laparascopic as well as the open procedure were discussed. I explained that a cholangiogram with dye would be attempted but not always done. The risks and complications including but not exclusive to include infection, blood loss, bile ductal damage, bile leakage, retained stones and non resolution of pain . If any of these were to occur it may require an additional surgery to correct. The patient understands and agrees to the procedure. The expected convalescence was discussed. The patient was counseled at length about the risks of chaparrita Covid-19 in the perioperative period and any recovery window from their procedure. The patient was made aware that chaparrita Covid-19  may worsen their prognosis for recovering from their procedure  and lend to a higher morbidity and/or mortality risk. The patient was given the options of postponing their procedure. All material risks, benefits, and alternatives were discussed. The patient does wish to proceed with the procedure at this time. Please note this report has been partially produced using speech recognition software and may cause contain errors related to that system including grammar, punctuation and spelling as well as words and phrases that may seem inappropriate.  If there are questions or concerns please feel free to contact me to clarify      SUBJECTIVE/OBJECTIVE:  NABOR Bailey is a 60 y.o. female who is being seen at the request of [facility]  for possible gallbladder disease. The symptoms include intermittent right upper quadrant pain, nausea and vomiting. The patient denies juandice and/or dark urine. The symptoms were first noticed 6 months ago. The pain is rated as a 8 in intensity. The symptoms are increasing with time. The patient does have a family history of gallbladder disease. Ultrasound showed gallstones vs sludge  Hida Scan was done and normal with normal EF. CAT scan was not done. She is not on any anticoagulants. Review of Systems   Constitutional: Negative for activity change, appetite change and unexpected weight change. HENT: Negative for congestion, nosebleeds, rhinorrhea and sneezing. Eyes: Negative for visual disturbance. Respiratory: Negative for chest tightness and shortness of breath. Cardiovascular: Negative for chest pain and leg swelling. Gastrointestinal: Positive for abdominal pain, nausea and vomiting. Negative for abdominal distention, blood in stool and rectal pain. Endocrine: Negative. Genitourinary: Negative for difficulty urinating. Musculoskeletal: Negative. Skin: Negative for color change. Allergic/Immunologic: Negative. Neurological: Negative for seizures, light-headedness, numbness and headaches. Hematological: Does not bruise/bleed easily. Psychiatric/Behavioral: Negative for sleep disturbance.      Past Medical History:   Diagnosis Date    Acne     Herniated disc     L4-L5    Hip pain, left     Hyperlipidemia     TMJ (temporomandibular joint disorder)      Past Surgical History:   Procedure Laterality Date    BUNIONECTOMY Right 09/2019    HYSTERECTOMY  2000    THYROID SURGERY  1968     Social History     Tobacco Use    Smoking status: Former Smoker     Packs/day: 0.25     Years: 20.00     Pack years: 5.00     Types: Cigarettes     Quit date: 0     Years since quittin.2    Smokeless tobacco: Never Used   Substance Use Topics    Alcohol use: No    Drug use: No     Family History   Problem Relation Age of Onset    Heart Disease Mother     High Blood Pressure Father     Heart Disease Father      Augmentin [amoxicillin-pot clavulanate], Cinnamic acide, Balsam, Linalool, Methylisothiazolinone, and Other  Allergies   Allergen Reactions    Augmentin [Amoxicillin-Pot Clavulanate] Diarrhea    Cinnamic Acide     Balsam Rash     equivocal patch test     Linalool Rash     Positive patch test    Methylisothiazolinone Rash     Positive patch test    Other Rash     Positive patch test     Current Outpatient Medications   Medication Sig Dispense Refill    solifenacin (VESICARE) 10 MG tablet TAKE ONE TABLET BY MOUTH EVERY DAY 30 tablet 5    escitalopram (LEXAPRO) 20 MG tablet TAKE ONE TABLET BY MOUTH EVERY DAY 30 tablet 11    pantoprazole (PROTONIX) 40 MG tablet Take 1 tablet by mouth every morning (before breakfast) 90 tablet 1    levocetirizine (XYZAL) 5 MG tablet Take 1 tablet by mouth nightly 90 tablet 1    cetirizine (ZYRTEC) 10 MG tablet Take 1 tablet by mouth daily 90 tablet 1    doxycycline monohydrate (MONODOX) 100 MG capsule Take 1 capsule by mouth 2 times daily 20 capsule 0    traZODone (DESYREL) 100 MG tablet TAKE ONE TABLET BY MOUTH NIGHTLY 30 tablet 5    clobetasol (TEMOVATE) 0.05 % cream Apply topically 2 times daily.  45 g 1    meloxicam (MOBIC) 15 MG tablet TAKE ONE TABLET BY MOUTH EVERY DAY 30 tablet 0    rosuvastatin (CRESTOR) 5 MG tablet Take 1 tablet by mouth daily 30 tablet 11    vitamin D (ERGOCALCIFEROL) 1.25 MG (09693 UT) CAPS capsule Take 1 capsule by mouth once a week 4 capsule 11    albuterol sulfate (PROAIR RESPICLICK) 946 (90 Base) MCG/ACT aerosol powder inhalation Inhale 2 puffs into the lungs every 6 hours as needed for Wheezing or Shortness of Breath 1 Inhaler 5    hydrochlorothiazide (HYDRODIURIL) 25 MG tablet Take 1 tablet by mouth 2 times daily 60 tablet 11    valACYclovir (VALTREX) 500 MG tablet Take 1 tablet by mouth 2 times daily as needed 20 tablet 0     No current facility-administered medications for this visit. /82   Temp 97.3 °F (36.3 °C) (Temporal)   Ht 5' 3\" (1.6 m)   Wt 259 lb (117.5 kg)   BMI 45.88 kg/m²     Physical Exam  Constitutional:       General: She is not in acute distress. Appearance: Normal appearance. HENT:      Mouth/Throat:      Mouth: Mucous membranes are moist.      Pharynx: Oropharynx is clear. Eyes:      Pupils: Pupils are equal, round, and reactive to light. Neck:      Comments: Neck is supple with out masses, no thyromegaly, trachea midline  Cardiovascular:      Rate and Rhythm: Normal rate. Heart sounds: No murmur. Pulmonary:      Effort: Pulmonary effort is normal. No respiratory distress. Breath sounds: Normal breath sounds. Abdominal:      Palpations: Abdomen is soft. There is no hepatomegaly or splenomegaly. Tenderness: There is abdominal tenderness in the right upper quadrant and epigastric area. Hernia: No hernia is present. Musculoskeletal:      Comments: Normal gait   Skin:     Findings: No bruising, lesion or rash. Neurological:      Mental Status: She is alert and oriented to person, place, and time. Psychiatric:         Mood and Affect: Mood normal.         Judgment: Judgment normal.       An electronic signature was used to authenticate this note.     --Jing Acevedo MD

## 2021-03-31 ENCOUNTER — PATIENT MESSAGE (OUTPATIENT)
Dept: FAMILY MEDICINE CLINIC | Age: 66
End: 2021-03-31

## 2021-03-31 DIAGNOSIS — F41.9 ANXIETY AND DEPRESSION: Primary | ICD-10-CM

## 2021-03-31 DIAGNOSIS — F32.A ANXIETY AND DEPRESSION: Primary | ICD-10-CM

## 2021-04-07 RX ORDER — CITALOPRAM 10 MG/1
10 TABLET ORAL DAILY
Qty: 30 TABLET | Refills: 3 | Status: SHIPPED | OUTPATIENT
Start: 2021-04-07 | End: 2021-05-18 | Stop reason: SDUPTHER

## 2021-04-14 ENCOUNTER — NURSE ONLY (OUTPATIENT)
Dept: PRIMARY CARE CLINIC | Age: 66
End: 2021-04-14

## 2021-04-14 ENCOUNTER — HOSPITAL ENCOUNTER (OUTPATIENT)
Dept: PREADMISSION TESTING | Age: 66
Discharge: HOME | End: 2021-04-18
Payer: COMMERCIAL

## 2021-04-14 VITALS
BODY MASS INDEX: 45.82 KG/M2 | HEART RATE: 64 BPM | TEMPERATURE: 96.4 F | OXYGEN SATURATION: 98 % | HEIGHT: 63 IN | DIASTOLIC BLOOD PRESSURE: 69 MMHG | WEIGHT: 258.6 LBS | SYSTOLIC BLOOD PRESSURE: 154 MMHG | RESPIRATION RATE: 16 BRPM

## 2021-04-14 DIAGNOSIS — Z41.9 ELECTIVE SURGERY: ICD-10-CM

## 2021-04-14 DIAGNOSIS — Z41.9 ELECTIVE SURGERY: Primary | ICD-10-CM

## 2021-04-14 LAB
EKG ATRIAL RATE: 58 BPM
EKG P AXIS: 45 DEGREES
EKG P-R INTERVAL: 124 MS
EKG Q-T INTERVAL: 440 MS
EKG QRS DURATION: 86 MS
EKG QTC CALCULATION (BAZETT): 431 MS
EKG R AXIS: 50 DEGREES
EKG T AXIS: 53 DEGREES
EKG VENTRICULAR RATE: 58 BPM
HCT VFR BLD CALC: 42.6 % (ref 37–47)
HEMOGLOBIN: 13.8 G/DL (ref 12–16)
MCH RBC QN AUTO: 28.6 PG (ref 27–31.3)
MCHC RBC AUTO-ENTMCNC: 32.4 % (ref 33–37)
MCV RBC AUTO: 88.3 FL (ref 82–100)
PDW BLD-RTO: 14.8 % (ref 11.5–14.5)
PLATELET # BLD: 213 K/UL (ref 130–400)
RBC # BLD: 4.83 M/UL (ref 4.2–5.4)
WBC # BLD: 11.6 K/UL (ref 4.8–10.8)

## 2021-04-14 PROCEDURE — 85027 COMPLETE CBC AUTOMATED: CPT

## 2021-04-14 PROCEDURE — 93005 ELECTROCARDIOGRAM TRACING: CPT | Performed by: SURGERY

## 2021-04-14 PROCEDURE — 93010 ELECTROCARDIOGRAM REPORT: CPT | Performed by: INTERNAL MEDICINE

## 2021-04-14 RX ORDER — KETOROLAC TROMETHAMINE 30 MG/ML
30 INJECTION, SOLUTION INTRAMUSCULAR; INTRAVENOUS ONCE
Status: CANCELLED | OUTPATIENT
Start: 2021-04-20 | End: 2021-04-24

## 2021-04-14 RX ORDER — FEXOFENADINE HCL 180 MG/1
180 TABLET ORAL 2 TIMES DAILY
COMMUNITY
End: 2021-06-21

## 2021-04-14 RX ORDER — TRAMADOL HYDROCHLORIDE 100 MG/1
100 TABLET, COATED ORAL EVERY 6 HOURS PRN
COMMUNITY

## 2021-04-14 RX ORDER — PREDNISONE 20 MG/1
TABLET ORAL
COMMUNITY
Start: 2021-03-31 | End: 2021-04-14

## 2021-04-14 RX ORDER — ASPIRIN 325 MG
325 TABLET ORAL DAILY
COMMUNITY
End: 2022-01-21

## 2021-04-14 RX ORDER — CEFAZOLIN SODIUM 2 G/50ML
2000 SOLUTION INTRAVENOUS ONCE
Status: CANCELLED | OUTPATIENT
Start: 2021-04-20

## 2021-04-14 NOTE — PROGRESS NOTES
PAT for COVID-19  Provider: [facility]  Surgery Date: 4/20/21

## 2021-04-15 LAB
SARS-COV-2: NOT DETECTED
SOURCE: NORMAL

## 2021-04-15 RX ORDER — SODIUM CHLORIDE 0.9 % (FLUSH) 0.9 %
10 SYRINGE (ML) INJECTION PRN
Status: CANCELLED | OUTPATIENT
Start: 2021-04-20

## 2021-04-15 RX ORDER — LIDOCAINE HYDROCHLORIDE 10 MG/ML
1 INJECTION, SOLUTION EPIDURAL; INFILTRATION; INTRACAUDAL; PERINEURAL
Status: CANCELLED | OUTPATIENT
Start: 2021-04-20 | End: 2021-04-20

## 2021-04-15 RX ORDER — SODIUM CHLORIDE 0.9 % (FLUSH) 0.9 %
10 SYRINGE (ML) INJECTION EVERY 12 HOURS SCHEDULED
Status: CANCELLED | OUTPATIENT
Start: 2021-04-20

## 2021-04-15 RX ORDER — SODIUM CHLORIDE, SODIUM LACTATE, POTASSIUM CHLORIDE, CALCIUM CHLORIDE 600; 310; 30; 20 MG/100ML; MG/100ML; MG/100ML; MG/100ML
INJECTION, SOLUTION INTRAVENOUS CONTINUOUS
Status: CANCELLED | OUTPATIENT
Start: 2021-04-20

## 2021-04-15 RX ORDER — SODIUM CHLORIDE 9 MG/ML
25 INJECTION, SOLUTION INTRAVENOUS PRN
Status: CANCELLED | OUTPATIENT
Start: 2021-04-20

## 2021-04-20 ENCOUNTER — HOSPITAL ENCOUNTER (OUTPATIENT)
Age: 66
Setting detail: OUTPATIENT SURGERY
Discharge: HOME | End: 2021-04-20
Attending: SURGERY | Admitting: SURGERY
Payer: COMMERCIAL

## 2021-04-20 ENCOUNTER — ANESTHESIA EVENT (OUTPATIENT)
Dept: OPERATING ROOM | Age: 66
End: 2021-04-20
Payer: COMMERCIAL

## 2021-04-20 ENCOUNTER — ANESTHESIA (OUTPATIENT)
Dept: OPERATING ROOM | Age: 66
End: 2021-04-20
Payer: COMMERCIAL

## 2021-04-20 ENCOUNTER — APPOINTMENT (OUTPATIENT)
Dept: GENERAL RADIOLOGY | Age: 66
End: 2021-04-20
Attending: SURGERY
Payer: COMMERCIAL

## 2021-04-20 VITALS
HEART RATE: 70 BPM | HEIGHT: 63 IN | SYSTOLIC BLOOD PRESSURE: 128 MMHG | TEMPERATURE: 98.1 F | RESPIRATION RATE: 12 BRPM | BODY MASS INDEX: 45.81 KG/M2 | DIASTOLIC BLOOD PRESSURE: 61 MMHG | WEIGHT: 258.56 LBS | OXYGEN SATURATION: 96 %

## 2021-04-20 VITALS
OXYGEN SATURATION: 80 % | TEMPERATURE: 97 F | DIASTOLIC BLOOD PRESSURE: 70 MMHG | SYSTOLIC BLOOD PRESSURE: 105 MMHG | RESPIRATION RATE: 12 BRPM

## 2021-04-20 DIAGNOSIS — R10.11 RUQ PAIN: Primary | ICD-10-CM

## 2021-04-20 PROCEDURE — C1729 CATH, DRAINAGE: HCPCS | Performed by: SURGERY

## 2021-04-20 PROCEDURE — 3600000014 HC SURGERY LEVEL 4 ADDTL 15MIN: Performed by: SURGERY

## 2021-04-20 PROCEDURE — 6360000002 HC RX W HCPCS: Performed by: ANESTHESIOLOGY

## 2021-04-20 PROCEDURE — 6360000002 HC RX W HCPCS: Performed by: SURGERY

## 2021-04-20 PROCEDURE — 7100000001 HC PACU RECOVERY - ADDTL 15 MIN: Performed by: SURGERY

## 2021-04-20 PROCEDURE — 2709999900 HC NON-CHARGEABLE SUPPLY: Performed by: SURGERY

## 2021-04-20 PROCEDURE — 3600000004 HC SURGERY LEVEL 4 BASE: Performed by: SURGERY

## 2021-04-20 PROCEDURE — 7100000010 HC PHASE II RECOVERY - FIRST 15 MIN: Performed by: SURGERY

## 2021-04-20 PROCEDURE — 3700000000 HC ANESTHESIA ATTENDED CARE: Performed by: SURGERY

## 2021-04-20 PROCEDURE — 6370000000 HC RX 637 (ALT 250 FOR IP): Performed by: ANESTHESIOLOGY

## 2021-04-20 PROCEDURE — 2500000003 HC RX 250 WO HCPCS: Performed by: NURSE PRACTITIONER

## 2021-04-20 PROCEDURE — 47563 LAPARO CHOLECYSTECTOMY/GRAPH: CPT | Performed by: SURGERY

## 2021-04-20 PROCEDURE — 76942 ECHO GUIDE FOR BIOPSY: CPT | Performed by: ANESTHESIOLOGY

## 2021-04-20 PROCEDURE — 6360000004 HC RX CONTRAST MEDICATION: Performed by: SURGERY

## 2021-04-20 PROCEDURE — 7100000011 HC PHASE II RECOVERY - ADDTL 15 MIN: Performed by: SURGERY

## 2021-04-20 PROCEDURE — 7100000000 HC PACU RECOVERY - FIRST 15 MIN: Performed by: SURGERY

## 2021-04-20 PROCEDURE — 2580000003 HC RX 258: Performed by: SURGERY

## 2021-04-20 PROCEDURE — 2580000003 HC RX 258: Performed by: NURSE PRACTITIONER

## 2021-04-20 PROCEDURE — 2500000003 HC RX 250 WO HCPCS: Performed by: ANESTHESIOLOGY

## 2021-04-20 PROCEDURE — 74300 X-RAY BILE DUCTS/PANCREAS: CPT

## 2021-04-20 PROCEDURE — 88304 TISSUE EXAM BY PATHOLOGIST: CPT

## 2021-04-20 PROCEDURE — 3700000001 HC ADD 15 MINUTES (ANESTHESIA): Performed by: SURGERY

## 2021-04-20 RX ORDER — PROPOFOL 10 MG/ML
INJECTION, EMULSION INTRAVENOUS PRN
Status: DISCONTINUED | OUTPATIENT
Start: 2021-04-20 | End: 2021-04-20 | Stop reason: SDUPTHER

## 2021-04-20 RX ORDER — SODIUM CHLORIDE 9 MG/ML
25 INJECTION, SOLUTION INTRAVENOUS PRN
Status: DISCONTINUED | OUTPATIENT
Start: 2021-04-20 | End: 2021-04-20 | Stop reason: HOSPADM

## 2021-04-20 RX ORDER — CEFAZOLIN SODIUM 2 G/50ML
2000 SOLUTION INTRAVENOUS ONCE
Status: COMPLETED | OUTPATIENT
Start: 2021-04-20 | End: 2021-04-20

## 2021-04-20 RX ORDER — SODIUM CHLORIDE, SODIUM LACTATE, POTASSIUM CHLORIDE, CALCIUM CHLORIDE 600; 310; 30; 20 MG/100ML; MG/100ML; MG/100ML; MG/100ML
INJECTION, SOLUTION INTRAVENOUS CONTINUOUS
Status: DISCONTINUED | OUTPATIENT
Start: 2021-04-20 | End: 2021-04-20 | Stop reason: HOSPADM

## 2021-04-20 RX ORDER — DEXAMETHASONE SODIUM PHOSPHATE 10 MG/ML
INJECTION INTRAMUSCULAR; INTRAVENOUS PRN
Status: DISCONTINUED | OUTPATIENT
Start: 2021-04-20 | End: 2021-04-20 | Stop reason: SDUPTHER

## 2021-04-20 RX ORDER — HYDROCODONE BITARTRATE AND ACETAMINOPHEN 5; 325 MG/1; MG/1
1 TABLET ORAL PRN
Status: COMPLETED | OUTPATIENT
Start: 2021-04-20 | End: 2021-04-20

## 2021-04-20 RX ORDER — TRAMADOL HYDROCHLORIDE 50 MG/1
50 TABLET ORAL EVERY 6 HOURS PRN
Status: DISCONTINUED | OUTPATIENT
Start: 2021-04-20 | End: 2021-04-20 | Stop reason: HOSPADM

## 2021-04-20 RX ORDER — SODIUM CHLORIDE 0.9 % (FLUSH) 0.9 %
10 SYRINGE (ML) INJECTION EVERY 12 HOURS SCHEDULED
Status: DISCONTINUED | OUTPATIENT
Start: 2021-04-20 | End: 2021-04-20 | Stop reason: HOSPADM

## 2021-04-20 RX ORDER — MORPHINE SULFATE 2 MG/ML
1 INJECTION, SOLUTION INTRAMUSCULAR; INTRAVENOUS
Status: DISCONTINUED | OUTPATIENT
Start: 2021-04-20 | End: 2021-04-20 | Stop reason: HOSPADM

## 2021-04-20 RX ORDER — SODIUM CHLORIDE 0.9 % (FLUSH) 0.9 %
10 SYRINGE (ML) INJECTION PRN
Status: DISCONTINUED | OUTPATIENT
Start: 2021-04-20 | End: 2021-04-20 | Stop reason: HOSPADM

## 2021-04-20 RX ORDER — MIDAZOLAM HYDROCHLORIDE 1 MG/ML
INJECTION INTRAMUSCULAR; INTRAVENOUS PRN
Status: DISCONTINUED | OUTPATIENT
Start: 2021-04-20 | End: 2021-04-20 | Stop reason: SDUPTHER

## 2021-04-20 RX ORDER — HYDROCODONE BITARTRATE AND ACETAMINOPHEN 5; 325 MG/1; MG/1
2 TABLET ORAL PRN
Status: COMPLETED | OUTPATIENT
Start: 2021-04-20 | End: 2021-04-20

## 2021-04-20 RX ORDER — LIDOCAINE HYDROCHLORIDE 10 MG/ML
1 INJECTION, SOLUTION EPIDURAL; INFILTRATION; INTRACAUDAL; PERINEURAL
Status: COMPLETED | OUTPATIENT
Start: 2021-04-20 | End: 2021-04-20

## 2021-04-20 RX ORDER — KETOROLAC TROMETHAMINE 30 MG/ML
30 INJECTION, SOLUTION INTRAMUSCULAR; INTRAVENOUS ONCE
Status: COMPLETED | OUTPATIENT
Start: 2021-04-20 | End: 2021-04-20

## 2021-04-20 RX ORDER — HYDROCODONE BITARTRATE AND ACETAMINOPHEN 5; 325 MG/1; MG/1
1 TABLET ORAL EVERY 6 HOURS PRN
Qty: 20 TABLET | Refills: 0 | Status: SHIPPED | OUTPATIENT
Start: 2021-04-20 | End: 2021-04-25

## 2021-04-20 RX ORDER — ROCURONIUM BROMIDE 10 MG/ML
INJECTION, SOLUTION INTRAVENOUS PRN
Status: DISCONTINUED | OUTPATIENT
Start: 2021-04-20 | End: 2021-04-20 | Stop reason: SDUPTHER

## 2021-04-20 RX ORDER — ONDANSETRON 2 MG/ML
INJECTION INTRAMUSCULAR; INTRAVENOUS PRN
Status: DISCONTINUED | OUTPATIENT
Start: 2021-04-20 | End: 2021-04-20 | Stop reason: SDUPTHER

## 2021-04-20 RX ORDER — ONDANSETRON 2 MG/ML
4 INJECTION INTRAMUSCULAR; INTRAVENOUS
Status: DISCONTINUED | OUTPATIENT
Start: 2021-04-20 | End: 2021-04-20 | Stop reason: HOSPADM

## 2021-04-20 RX ORDER — METOCLOPRAMIDE HYDROCHLORIDE 5 MG/ML
10 INJECTION INTRAMUSCULAR; INTRAVENOUS
Status: DISCONTINUED | OUTPATIENT
Start: 2021-04-20 | End: 2021-04-20 | Stop reason: HOSPADM

## 2021-04-20 RX ORDER — LIDOCAINE HYDROCHLORIDE 20 MG/ML
INJECTION, SOLUTION INTRAVENOUS PRN
Status: DISCONTINUED | OUTPATIENT
Start: 2021-04-20 | End: 2021-04-20 | Stop reason: SDUPTHER

## 2021-04-20 RX ORDER — FENTANYL CITRATE 50 UG/ML
50 INJECTION, SOLUTION INTRAMUSCULAR; INTRAVENOUS EVERY 10 MIN PRN
Status: DISCONTINUED | OUTPATIENT
Start: 2021-04-20 | End: 2021-04-20 | Stop reason: HOSPADM

## 2021-04-20 RX ORDER — DIPHENHYDRAMINE HYDROCHLORIDE 50 MG/ML
12.5 INJECTION INTRAMUSCULAR; INTRAVENOUS
Status: DISCONTINUED | OUTPATIENT
Start: 2021-04-20 | End: 2021-04-20 | Stop reason: HOSPADM

## 2021-04-20 RX ORDER — ROPIVACAINE HYDROCHLORIDE 5 MG/ML
INJECTION, SOLUTION EPIDURAL; INFILTRATION; PERINEURAL PRN
Status: DISCONTINUED | OUTPATIENT
Start: 2021-04-20 | End: 2021-04-20 | Stop reason: SDUPTHER

## 2021-04-20 RX ORDER — MEPERIDINE HYDROCHLORIDE 25 MG/ML
12.5 INJECTION INTRAMUSCULAR; INTRAVENOUS; SUBCUTANEOUS EVERY 5 MIN PRN
Status: DISCONTINUED | OUTPATIENT
Start: 2021-04-20 | End: 2021-04-20 | Stop reason: HOSPADM

## 2021-04-20 RX ORDER — ONDANSETRON 2 MG/ML
4 INJECTION INTRAMUSCULAR; INTRAVENOUS EVERY 6 HOURS PRN
Status: DISCONTINUED | OUTPATIENT
Start: 2021-04-20 | End: 2021-04-20 | Stop reason: HOSPADM

## 2021-04-20 RX ORDER — MAGNESIUM HYDROXIDE 1200 MG/15ML
LIQUID ORAL CONTINUOUS PRN
Status: COMPLETED | OUTPATIENT
Start: 2021-04-20 | End: 2021-04-20

## 2021-04-20 RX ORDER — FENTANYL CITRATE 50 UG/ML
INJECTION, SOLUTION INTRAMUSCULAR; INTRAVENOUS PRN
Status: DISCONTINUED | OUTPATIENT
Start: 2021-04-20 | End: 2021-04-20 | Stop reason: SDUPTHER

## 2021-04-20 RX ORDER — PROMETHAZINE HYDROCHLORIDE 12.5 MG/1
12.5 TABLET ORAL EVERY 6 HOURS PRN
Status: DISCONTINUED | OUTPATIENT
Start: 2021-04-20 | End: 2021-04-20 | Stop reason: HOSPADM

## 2021-04-20 RX ADMIN — ROCURONIUM BROMIDE 50 MG: 10 INJECTION INTRAVENOUS at 07:38

## 2021-04-20 RX ADMIN — HYDROCODONE BITARTRATE AND ACETAMINOPHEN 2 TABLET: 5; 325 TABLET ORAL at 10:24

## 2021-04-20 RX ADMIN — CEFAZOLIN SODIUM 2000 MG: 2 SOLUTION INTRAVENOUS at 07:49

## 2021-04-20 RX ADMIN — SODIUM CHLORIDE, POTASSIUM CHLORIDE, SODIUM LACTATE AND CALCIUM CHLORIDE: 600; 310; 30; 20 INJECTION, SOLUTION INTRAVENOUS at 06:34

## 2021-04-20 RX ADMIN — ROPIVACAINE HYDROCHLORIDE 75 MG: 5 INJECTION, SOLUTION EPIDURAL; INFILTRATION; PERINEURAL at 07:20

## 2021-04-20 RX ADMIN — LIDOCAINE HYDROCHLORIDE 0.2 ML: 10 INJECTION, SOLUTION EPIDURAL; INFILTRATION; INTRACAUDAL; PERINEURAL at 06:34

## 2021-04-20 RX ADMIN — MIDAZOLAM HYDROCHLORIDE 6 MG: 2 INJECTION, SOLUTION INTRAMUSCULAR; INTRAVENOUS at 07:10

## 2021-04-20 RX ADMIN — PROPOFOL 200 MG: 10 INJECTION, EMULSION INTRAVENOUS at 07:38

## 2021-04-20 RX ADMIN — FENTANYL CITRATE 50 MCG: 50 INJECTION, SOLUTION INTRAMUSCULAR; INTRAVENOUS at 09:22

## 2021-04-20 RX ADMIN — FENTANYL CITRATE 50 MCG: 50 INJECTION, SOLUTION INTRAMUSCULAR; INTRAVENOUS at 07:55

## 2021-04-20 RX ADMIN — FENTANYL CITRATE 50 MCG: 50 INJECTION, SOLUTION INTRAMUSCULAR; INTRAVENOUS at 07:38

## 2021-04-20 RX ADMIN — LIDOCAINE HYDROCHLORIDE 40 MG: 20 INJECTION, SOLUTION INTRAVENOUS at 07:38

## 2021-04-20 RX ADMIN — ROPIVACAINE HYDROCHLORIDE 75 MG: 5 INJECTION, SOLUTION EPIDURAL; INFILTRATION; PERINEURAL at 07:15

## 2021-04-20 RX ADMIN — DEXAMETHASONE SODIUM PHOSPHATE 5 MG: 10 INJECTION INTRAMUSCULAR; INTRAVENOUS at 07:20

## 2021-04-20 RX ADMIN — SUGAMMADEX 200 MG: 100 INJECTION, SOLUTION INTRAVENOUS at 08:28

## 2021-04-20 RX ADMIN — FENTANYL CITRATE 50 MCG: 50 INJECTION, SOLUTION INTRAMUSCULAR; INTRAVENOUS at 08:50

## 2021-04-20 RX ADMIN — DEXAMETHASONE SODIUM PHOSPHATE 5 MG: 10 INJECTION INTRAMUSCULAR; INTRAVENOUS at 07:15

## 2021-04-20 RX ADMIN — KETOROLAC TROMETHAMINE 30 MG: 30 INJECTION, SOLUTION INTRAMUSCULAR; INTRAVENOUS at 07:02

## 2021-04-20 RX ADMIN — ONDANSETRON 4 MG: 2 INJECTION INTRAMUSCULAR; INTRAVENOUS at 08:28

## 2021-04-20 ASSESSMENT — PAIN SCALES - GENERAL
PAINLEVEL_OUTOF10: 7
PAINLEVEL_OUTOF10: 8
PAINLEVEL_OUTOF10: 0
PAINLEVEL_OUTOF10: 8
PAINLEVEL_OUTOF10: 5
PAINLEVEL_OUTOF10: 6

## 2021-04-20 ASSESSMENT — PULMONARY FUNCTION TESTS
PIF_VALUE: 22
PIF_VALUE: 21
PIF_VALUE: 20
PIF_VALUE: 21
PIF_VALUE: 27
PIF_VALUE: 21
PIF_VALUE: 23
PIF_VALUE: 26
PIF_VALUE: 23
PIF_VALUE: 25
PIF_VALUE: 20
PIF_VALUE: 21
PIF_VALUE: 27
PIF_VALUE: 25
PIF_VALUE: 24
PIF_VALUE: 23
PIF_VALUE: 25
PIF_VALUE: 3
PIF_VALUE: 20
PIF_VALUE: 21
PIF_VALUE: 25
PIF_VALUE: 26
PIF_VALUE: 26
PIF_VALUE: 28
PIF_VALUE: 21
PIF_VALUE: 5
PIF_VALUE: 26
PIF_VALUE: 26
PIF_VALUE: 3

## 2021-04-20 ASSESSMENT — PAIN DESCRIPTION - LOCATION: LOCATION: ABDOMEN

## 2021-04-20 ASSESSMENT — PAIN DESCRIPTION - PAIN TYPE: TYPE: SURGICAL PAIN

## 2021-04-20 NOTE — PROGRESS NOTES
Abdomen soft, dermabond intact to umbilical incision, three bandaides clean and intact, one to LISA drain, Lisa emptied for 30cc light pink drainage. Pt tolerating po ice chips.  Dozing at intervals, States pain \"is easing a little\"

## 2021-04-20 NOTE — INTERVAL H&P NOTE
Update History & Physical    The patient's History and Physical of March 29, 2021 was reviewed with the patient and I examined the patient. There was no change. The surgical site was confirmed by the patient and me. Plan: The risks, benefits, expected outcome, and alternative to the recommended procedure have been discussed with the patient. Patient understands and wants to proceed with the procedure.      Electronically signed by Devendra العلي MD on 4/20/2021 at 7:27 AM

## 2021-04-20 NOTE — BRIEF OP NOTE
Brief Postoperative Note      Patient: Dinah Ordonez  YOB: 1955  MRN: [id number]    Date of Procedure: 4/20/2021    Pre-Op Diagnosis: RIGHT UPPER QUADRANT ABD PAIN    Post-Op Diagnosis: Same       Procedure(s):  LAPAROSCOPIC CHOLECYSTECTOMY  WITH CHOLANGIOGRAMS    Surgeon(s):  Lj Weathers MD    Assistant:  First Assistant: Tamanna Pierre    Anesthesia: General    Estimated Blood Loss (mL): Minimal    Complications: None    Specimens:   ID Type Source Tests Collected by Time Destination   A :  Tissue Gallbladder SURGICAL PATHOLOGY Lj Weathers MD 4/20/2021 8670        Implants:  * No implants in log *      Drains:   Closed/Suction Drain Right Abdomen Bulb 15 Indonesian (Active)       Findings: Distended gallbladder with normal cholangiogram    Electronically signed by Lj eWathers MD on 4/20/2021 at 8:28 AM

## 2021-04-20 NOTE — ANESTHESIA PROCEDURE NOTES
Peripheral Block    Patient location during procedure: pre-op  Start time: 4/20/2021 7:10 AM  End time: 4/20/2021 7:20 AM  Staffing  Performed: anesthesiologist   Anesthesiologist: Amado Kingsley MD  Preanesthetic Checklist  Completed: patient identified, IV checked, site marked, risks and benefits discussed, surgical consent, monitors and equipment checked, pre-op evaluation, timeout performed, anesthesia consent given, oxygen available and patient being monitored  Peripheral Block  Patient position: supine  Prep: ChloraPrep  Patient monitoring: cardiac monitor, continuous pulse ox, frequent blood pressure checks and IV access  Block type: Erector spinae  Laterality: bilateral  Injection technique: single-shot  Guidance: ultrasound guided and IP approach, probe cover employed  Local infiltration: lidocaine  Provider prep: mask and sterile gloves  Local infiltration: lidocaine  Needle  Needle type: combined needle/nerve stimulator   Needle gauge: 21 G  Needle length: 10 cm  Needle localization: ultrasound guidance (IP approach, probe cover employed, T7 level)  Assessment  Injection assessment: negative aspiration for heme, no paresthesia on injection and local visualized surrounding nerve on ultrasound  Paresthesia pain: none  Slow fractionated injection: yes  Hemodynamics: stable  Additional Notes  30cc 0.25% ropivacaine with decadron injected each side  Reason for block: post-op pain management

## 2021-04-20 NOTE — ANESTHESIA PRE PROCEDURE
Department of Anesthesiology  Preprocedure Note       Name:  Margaret Hargrove   Age:  60 y.o.  :  1955                                          MRN:  [id number]         Date:  2021      Surgeon: Xin Morillo):  Danna Méndez MD    Procedure: Procedure(s):  LAPAROSCOPIC CHOLECYSTECTOMY  WITH CHOLANGIOGRAMS, POSSIBLE OPEN CHOLECYSTECTOMY    Medications prior to admission:   Prior to Admission medications    Medication Sig Start Date End Date Taking?  Authorizing Provider   fexofenadine (ALLEGRA ALLERGY) 180 MG tablet Take 180 mg by mouth 2 times daily   Yes Historical Provider, MD   rosuvastatin (CRESTOR) 5 MG tablet TAKE ONE TABLET BY MOUTH EVERY DAY 3/31/21  Yes Milagros Huang PA-C   solifenacin (VESICARE) 10 MG tablet TAKE ONE TABLET BY MOUTH EVERY DAY 3/6/21  Yes Milagros Huang PA-C   escitalopram (LEXAPRO) 20 MG tablet TAKE ONE TABLET BY MOUTH EVERY DAY 3/2/21  Yes Milagros Huang PA-C   pantoprazole (PROTONIX) 40 MG tablet Take 1 tablet by mouth every morning (before breakfast) 3/1/21  Yes Milagros Huang PA-C   levocetirizine (XYZAL) 5 MG tablet Take 1 tablet by mouth nightly 3/1/21  Yes Milagros Huang PA-C   meloxicam (MOBIC) 15 MG tablet TAKE ONE TABLET BY MOUTH EVERY DAY 20  Yes Milagros Huang PA-C   albuterol sulfate (PROAIR RESPICLICK) 454 (90 Base) MCG/ACT aerosol powder inhalation Inhale 2 puffs into the lungs every 6 hours as needed for Wheezing or Shortness of Breath 20  Yes Monica Miranda, APRN - CNP   traMADol HCl 100 MG TABS Take 100 mg by mouth every 6 hours as needed    Historical Provider, MD   aspirin 325 MG tablet Take 325 mg by mouth daily    Historical Provider, MD   citalopram (CELEXA) 10 MG tablet Take 1 tablet by mouth daily 21   Milagros Huang PA-C   traZODone (DESYREL) 100 MG tablet TAKE ONE TABLET BY MOUTH NIGHTLY 20   Milagros Huang PA-C   vitamin D (ERGOCALCIFEROL) 1.25 MG (02513 UT) CAPS capsule Take 1 capsule by mouth once a week 20   Lidia Huang PA-C hydrochlorothiazide (HYDRODIURIL) 25 MG tablet Take 1 tablet by mouth 2 times daily 6/16/16   Bonita Brennan MD   valACYclovir (VALTREX) 500 MG tablet Take 1 tablet by mouth 2 times daily as needed 6/16/16   Bonita Brennan MD       Current medications:    Current Facility-Administered Medications   Medication Dose Route Frequency Provider Last Rate Last Admin    0.9 % sodium chloride infusion  25 mL Intravenous PRN Zachery Siemens, APRN - CNP        lactated ringers infusion   Intravenous Continuous Zachery Siemens, APRN - CNP        lidocaine PF 1 % injection 1 mL  1 mL Intradermal Once PRN Zachery Siemens, APRN - CNP        sodium chloride flush 0.9 % injection 10 mL  10 mL Intravenous 2 times per day Zachery Siemens, APRN - CNP        sodium chloride flush 0.9 % injection 10 mL  10 mL Intravenous PRN Zachery Siemens, APRN - CNP        ceFAZolin (ANCEF) 2000 mg in dextrose 3 % 50 mL IVPB (duplex)  2,000 mg Intravenous Once Lj Weathers MD        ketorolac (TORADOL) injection 30 mg  30 mg Intravenous Once Lj Weathers MD           Allergies:     Allergies   Allergen Reactions    Augmentin [Amoxicillin-Pot Clavulanate] Diarrhea    Cinnamic Acide     Balsam Rash     equivocal patch test     Linalool Rash     Positive patch test    Methylisothiazolinone Rash     Positive patch test    Other Rash     Positive patch test, fragrance mix       Problem List:    Patient Active Problem List   Diagnosis Code    Hyperlipidemia E78.5    Hip pain, left M25.552    Herniated disc [id number]    BMI 40.0-44.9, adult ([facility]) Z68.41    Osteopenia of right hip M85.851    Vitamin D deficiency E55.9    Overactive bladder N32.81    Anxiety and depression F41.9, F32.9    Mild intermittent asthma without complication Q68.74    History of 2019 novel coronavirus disease (COVID-19) Z86.16    Tinea versicolor B36.0    RUQ pain R10.11    Irritant contact dermatitis due to cosmetics L24.3       Past Medical History:        Diagnosis Date    Acne     Asthma     Deep vein thrombosis (DVT) (HCC)     right leg    Herniated disc     L4-L5    Hip pain, left     Hyperlipidemia     TMJ (temporomandibular joint disorder)        Past Surgical History:        Procedure Laterality Date    BUNIONECTOMY Right 2019    HYSTERECTOMY  2000    THYROID SURGERY  1968       Social History:    Social History     Tobacco Use    Smoking status: Former Smoker     Packs/day: 0.25     Years: 20.00     Pack years: 5.00     Types: Cigarettes     Quit date:      Years since quittin.3    Smokeless tobacco: Never Used   Substance Use Topics    Alcohol use: No                                Counseling given: Not Answered      Vital Signs (Current):   Vitals:    21 0555   BP: (!) 126/59   Pulse: 68   Resp: 16   Temp: 97.5 °F (36.4 °C)   TempSrc: Temporal   SpO2: 96%   Weight: 258 lb 9 oz (117.3 kg)   Height: 5' 2.5\" (1.588 m)                                              BP Readings from Last 3 Encounters:   21 (!) 126/59   21 (!) 154/69   21 134/82       NPO Status: Time of last liquid consumption:                         Time of last solid consumption:                         Date of last liquid consumption: 21                        Date of last solid food consumption: 21    BMI:   Wt Readings from Last 3 Encounters:   21 258 lb 9 oz (117.3 kg)   21 258 lb 9.6 oz (117.3 kg)   21 259 lb (117.5 kg)     Body mass index is 46.54 kg/m².     CBC:   Lab Results   Component Value Date    WBC 11.6 2021    RBC 4.83 2021    RBC 3.77 2012    HGB 13.8 2021    HCT 42.6 2021    MCV 88.3 2021    RDW 14.8 2021     2021       CMP:   Lab Results   Component Value Date     2020    K 4.4 2020     2020    CO2 28 2020    BUN 13 2020    CREATININE 0.81 2020    GFRAA >60.0 12/14/2020    LABGLOM >60.0 12/14/2020    GLUCOSE 86 12/14/2020    PROT 7.4 12/14/2020    CALCIUM 9.3 12/14/2020    BILITOT 0.5 12/14/2020    ALKPHOS 72 12/14/2020    AST 19 12/14/2020    ALT 19 12/14/2020       POC Tests: No results for input(s): POCGLU, POCNA, POCK, POCCL, POCBUN, POCHEMO, POCHCT in the last 72 hours. Coags: No results found for: PROTIME, INR, APTT    HCG (If Applicable): No results found for: PREGTESTUR, PREGSERUM, HCG, HCGQUANT     ABGs: No results found for: PHART, PO2ART, IMV9YGI, QDR2ODQ, BEART, V6WYISTU     Type & Screen (If Applicable):  No results found for: LABABO, LABRH    Drug/Infectious Status (If Applicable):  No results found for: HIV, HEPCAB    COVID-19 Screening (If Applicable):   Lab Results   Component Value Date    COVID19 Not Detected 04/14/2021           Anesthesia Evaluation  Patient summary reviewed and Nursing notes reviewed no history of anesthetic complications:   Airway: Mallampati: II  TM distance: >3 FB   Neck ROM: full  Mouth opening: > = 3 FB Dental: normal exam         Pulmonary:   (+) asthma:                            Cardiovascular:Negative CV ROS    (+) hyperlipidemia      ECG reviewed               Beta Blocker:  Not on Beta Blocker         Neuro/Psych:   (+) psychiatric history:            GI/Hepatic/Renal:   (+) morbid obesity          Endo/Other: Negative Endo/Other ROS                    Abdominal:           Vascular: negative vascular ROS. Anesthesia Plan      general     ASA 3     (ANGELA block)  Induction: intravenous. MIPS: Postoperative opioids intended and Prophylactic antiemetics administered. Anesthetic plan and risks discussed with patient.         Attending anesthesiologist reviewed and agrees with Ambar Banks MD   4/20/2021

## 2021-04-20 NOTE — OP NOTE
Operative Note      Patient: Jameel Fallon  YOB: 1955  MRN: [id number]    Date of Procedure: 4/20/2021    Pre-Op Diagnosis: RIGHT UPPER QUADRANT ABD PAIN    Post-Op Diagnosis: Same       Procedure(s):  LAPAROSCOPIC CHOLECYSTECTOMY  WITH CHOLANGIOGRAMS    Surgeon(s):  Darren Solis MD    Assistant:   First Assistant: Ev Farias    Anesthesia: General    Estimated Blood Loss (mL): Minimal    Complications: None    Specimens:   ID Type Source Tests Collected by Time Destination   A :  Tissue Gallbladder SURGICAL PATHOLOGY Darren Solis MD 4/20/2021 2247        Implants:  * No implants in log *      Drains:   Closed/Suction Drain Right Abdomen Bulb 15 [place] (Active)     Detailed Description of Procedure:        DISCUSSION: Mallory Cardenas is a 60y.o. year old female who was seen in evaluation at request of Pastora Erwin PA-C regarding signs and symptoms, gallbladder disease, radiographic findings of distended gallbladder with no stones. After history and physical examination was performed potential diagnostic and therapeutic modalities discussed with the patient. Operative and non operative management was discussed. Laparoscopic and open approaches reviewed. She was given opportunity to ask questions. Once answered informed consent was obtained. She was brought to operating room on 4/20/2021 for procedure. OPERATIVE FINDINGS:  At time of laparoscopy liver contour was within normal limits. The gallbladder distended. Common bile duct was well visualized. Ductal structures well visualized. The remainder of abdominal viscera was unremarkable. Gallbladder removed as described below. PROCEDURE:  The patient was brought to the operating room and placed in supine position. Placed under continuous cardiac telemetry, blood pressure, pulse oximetry monitoring and placed under general anesthesia by anesthesia department. The anterior abdominal wall was prepped and draped in sterile fashion.  A time out was called and the patient and the procedure were properly identified. 1 cm periumbilical incision made with #11 scalpel blade. A Veres needle was inserted using the drop saline technique. Insufflation was done with 3 liters of CO2. A 5 mm port was inserted into the abdomen. A 5 mm scope was placed through the port. Visual inspection of the abdomen carried out. Please see operative findings above for specifics. Under direct visualization a 12 mm port was placed to right lateral falciform ligament in the epigastric area and two 5 mm ports placed to right lateral abdominal wall with no injury to underlying viscera. The gallbladder then grasped, retracted up towards right shoulder. Pericholecystic adhesions lysed using blunt dissection. Blunt dissection through the triangle of Calot allowed adequate visualization of cystic duct and cystic artery. The common bile duct was seen and free of harm. The cystic artery was clipped in continuity with endo clips and transected using endo juan a. A cystic duct cholangiogram was performed and read as normal by the radiologist.  The cystic duct was then clipped x2 about 1 cm from the common duct. The gallbladder then retracted laterally and dissected free off the gallbladder fossa using combination of blunt dissection and electrocautery. The gallbladder was then placed in a specimen bag and removed out the epigastric 12 mm port site. The fossa was reinspected. Adequate hemostasis appreciated. Clips noted to be in proper position. Common bile duct free of harm. No evidence of bile leak or stone spillage was evident. The right upper quadrant was copiously irrigated. Excess irrigation and fluid was removed via suction. A 15 Turkmen round drain was placed in Goodrich's pouch and brought out  through a 5 mm midclavicular port site. It was sutured to the skin with 3-0 Prolene. All ports and instruments removed from the patient's abdomen. Pneumoperitoneum  was reduced.   The fascial defect of the 12 mm port was approximated using 0 Vicryl suture. The three non drain wounds were closed using 3-0 Vicryl suture for the subcutaneous tissue and 4-0  Monocryl in running fashion for the skin. The wound was then covered with a skin adhesive. Sterile dressings were applied. The patient was brought out of anesthesia, transferred to PACU in stable and satisfactory condition. No immediate complications evident. Postoperative findings were discussed with the patient's  by phone. She was given discharge instructions, prescriptions for analgesics. She will follow up in my office in a 2-week period of time for reevaluation.       Electronically signed by Alyssa Keen MD on 4/20/21 at 8:29 AM EDT

## 2021-04-26 ENCOUNTER — OFFICE VISIT (OUTPATIENT)
Dept: FAMILY MEDICINE CLINIC | Age: 66
End: 2021-04-26
Payer: COMMERCIAL

## 2021-04-26 VITALS
RESPIRATION RATE: 18 BRPM | HEART RATE: 80 BPM | SYSTOLIC BLOOD PRESSURE: 130 MMHG | WEIGHT: 268 LBS | HEIGHT: 63 IN | DIASTOLIC BLOOD PRESSURE: 78 MMHG | TEMPERATURE: 97.7 F | OXYGEN SATURATION: 96 % | BODY MASS INDEX: 47.48 KG/M2

## 2021-04-26 DIAGNOSIS — R53.82 CHRONIC FATIGUE: ICD-10-CM

## 2021-04-26 DIAGNOSIS — Z82.0 FAMILY HISTORY OF PARKINSON DISEASE: ICD-10-CM

## 2021-04-26 DIAGNOSIS — Z12.31 ENCOUNTER FOR SCREENING MAMMOGRAM FOR MALIGNANT NEOPLASM OF BREAST: ICD-10-CM

## 2021-04-26 DIAGNOSIS — E55.9 VITAMIN D DEFICIENCY: ICD-10-CM

## 2021-04-26 DIAGNOSIS — J45.40 MODERATE PERSISTENT ASTHMA WITHOUT COMPLICATION: ICD-10-CM

## 2021-04-26 DIAGNOSIS — Z90.49 S/P CHOLECYSTECTOMY: Primary | ICD-10-CM

## 2021-04-26 DIAGNOSIS — F41.9 ANXIETY AND DEPRESSION: ICD-10-CM

## 2021-04-26 DIAGNOSIS — R30.0 DYSURIA: ICD-10-CM

## 2021-04-26 DIAGNOSIS — F32.A ANXIETY AND DEPRESSION: ICD-10-CM

## 2021-04-26 DIAGNOSIS — R25.1 TREMOR OF RIGHT HAND: ICD-10-CM

## 2021-04-26 DIAGNOSIS — Z09 HOSPITAL DISCHARGE FOLLOW-UP: ICD-10-CM

## 2021-04-26 PROBLEM — L24.3 IRRITANT CONTACT DERMATITIS DUE TO COSMETICS: Status: RESOLVED | Noted: 2021-01-26 | Resolved: 2021-04-26

## 2021-04-26 PROBLEM — B36.0 TINEA VERSICOLOR: Status: RESOLVED | Noted: 2020-12-14 | Resolved: 2021-04-26

## 2021-04-26 PROBLEM — J45.20 MILD INTERMITTENT ASTHMA WITHOUT COMPLICATION: Status: RESOLVED | Noted: 2020-07-23 | Resolved: 2021-04-26

## 2021-04-26 PROBLEM — R10.11 RUQ PAIN: Status: RESOLVED | Noted: 2021-01-26 | Resolved: 2021-04-26

## 2021-04-26 LAB
BILIRUBIN, POC: ABNORMAL
BLOOD URINE, POC: ABNORMAL
CLARITY, POC: ABNORMAL
COLOR, POC: YELLOW
FOLATE: 14.3 NG/ML (ref 7.3–26.1)
GLUCOSE URINE, POC: ABNORMAL
KETONES, POC: ABNORMAL
LEUKOCYTE EST, POC: ABNORMAL
NITRITE, POC: ABNORMAL
PH, POC: 6
PROTEIN, POC: ABNORMAL
REASON FOR REJECTION: NORMAL
REJECTED TEST: NORMAL
SPECIFIC GRAVITY, POC: 1.03
TSH SERPL DL<=0.05 MIU/L-ACNC: 2.39 UIU/ML (ref 0.44–3.86)
UROBILINOGEN, POC: ABNORMAL
VITAMIN B-12: 243 PG/ML (ref 232–1245)
VITAMIN D 25-HYDROXY: 22.6 NG/ML (ref 30–100)

## 2021-04-26 PROCEDURE — 3017F COLORECTAL CA SCREEN DOC REV: CPT | Performed by: PHYSICIAN ASSISTANT

## 2021-04-26 PROCEDURE — G8427 DOCREV CUR MEDS BY ELIG CLIN: HCPCS | Performed by: PHYSICIAN ASSISTANT

## 2021-04-26 PROCEDURE — 1036F TOBACCO NON-USER: CPT | Performed by: PHYSICIAN ASSISTANT

## 2021-04-26 PROCEDURE — 1111F DSCHRG MED/CURRENT MED MERGE: CPT | Performed by: PHYSICIAN ASSISTANT

## 2021-04-26 PROCEDURE — 81002 URINALYSIS NONAUTO W/O SCOPE: CPT | Performed by: PHYSICIAN ASSISTANT

## 2021-04-26 PROCEDURE — G8417 CALC BMI ABV UP PARAM F/U: HCPCS | Performed by: PHYSICIAN ASSISTANT

## 2021-04-26 PROCEDURE — 99215 OFFICE O/P EST HI 40 MIN: CPT | Performed by: PHYSICIAN ASSISTANT

## 2021-04-26 RX ORDER — ERGOCALCIFEROL 1.25 MG/1
50000 CAPSULE ORAL WEEKLY
Qty: 4 CAPSULE | Refills: 11 | Status: SHIPPED | OUTPATIENT
Start: 2021-04-26 | End: 2021-05-06 | Stop reason: DRUGHIGH

## 2021-04-26 RX ORDER — SULFAMETHOXAZOLE AND TRIMETHOPRIM 800; 160 MG/1; MG/1
1 TABLET ORAL 2 TIMES DAILY
Qty: 20 TABLET | Refills: 0 | Status: SHIPPED | OUTPATIENT
Start: 2021-04-26 | End: 2021-05-06

## 2021-04-26 RX ORDER — ALBUTEROL SULFATE 90 UG/1
1 AEROSOL, METERED RESPIRATORY (INHALATION) EVERY 4 HOURS PRN
Qty: 2 INHALER | Refills: 5 | Status: SHIPPED | OUTPATIENT
Start: 2021-04-26

## 2021-04-26 ASSESSMENT — ENCOUNTER SYMPTOMS
RECTAL PAIN: 0
CONSTIPATION: 0
COLOR CHANGE: 0
ABDOMINAL PAIN: 1
NAUSEA: 0
VOMITING: 0
WHEEZING: 0
COUGH: 0
DIARRHEA: 0
ABDOMINAL DISTENTION: 0
SHORTNESS OF BREATH: 0
CHEST TIGHTNESS: 0

## 2021-04-26 NOTE — PROGRESS NOTES
Post-Discharge Transitional Care Management Services or Hospital Follow Up    Lynda To   YOB: 1955    Date of Office Visit:  4/26/2021  Date of Hospital Admission: 4/20/21  Date of Hospital Discharge: 4/20/21  Readmission Risk Score(high >=14%.  Medium >=10%):No data recorded    Care management risk score Rising risk (score 2-5) and Complex Care (Scores >=6): 1     Non face to face  following discharge, date last encounter closed (first attempt may have been earlier): *No documented post hospital discharge outreach found in the last 14 days *No documented post hospital discharge outreach found in the last 14 days    Call initiated 2 business days of discharge: *No response recorded in the last 14 days     Patient Active Problem List   Diagnosis    Rash and nonspecific skin eruption    Hyperlipidemia    Hip pain, left    Herniated disc    BMI 45.0-49.9, adult ([facility] [address]    Osteopenia of right hip    Vitamin D deficiency    Overactive bladder    Anxiety and depression    History of 2019 novel coronavirus disease (COVID-19)    Moderate persistent asthma without complication    Dysuria    Chronic fatigue    Tremor of right hand       Allergies   Allergen Reactions    Augmentin [Amoxicillin-Pot Clavulanate] Diarrhea    Cinnamic Acide     Balsam Rash     equivocal patch test     Linalool Rash     Positive patch test    Methylisothiazolinone Rash     Positive patch test    Other Rash     Positive patch test, fragrance mix       Medications listed as ordered at the time of discharge from [facility] Medication Instructions ABENA:    Printed on:04/26/21 1122   Medication Information                      albuterol sulfate HFA (VENTOLIN HFA) 108 (90 Base) MCG/ACT inhaler  Inhale 1 puff into the lungs every 4 hours as needed for Wheezing or Shortness of Breath             aspirin 325 MG tablet  Take 325 mg by mouth daily             citalopram (CELEXA) 10 MG tablet  Take 1 tablet by mouth daily             fexofenadine (ALLEGRA ALLERGY) 180 MG tablet  Take 180 mg by mouth 2 times daily             fluticasone-salmeterol (ADVAIR) 100-50 MCG/DOSE diskus inhaler  Inhale 1 puff into the lungs every 12 hours RINSE MOUTH OUT AFTER USE.             hydrochlorothiazide (HYDRODIURIL) 25 MG tablet  Take 1 tablet by mouth 2 times daily             levocetirizine (XYZAL) 5 MG tablet  Take 1 tablet by mouth nightly             meloxicam (MOBIC) 15 MG tablet  TAKE ONE TABLET BY MOUTH EVERY DAY             pantoprazole (PROTONIX) 40 MG tablet  Take 1 tablet by mouth every morning (before breakfast)             rosuvastatin (CRESTOR) 5 MG tablet  TAKE ONE TABLET BY MOUTH EVERY DAY             solifenacin (VESICARE) 10 MG tablet  TAKE ONE TABLET BY MOUTH EVERY DAY             sulfamethoxazole-trimethoprim (BACTRIM DS) 800-160 MG per tablet  Take 1 tablet by mouth 2 times daily for 10 days             traMADol HCl 100 MG TABS  Take 100 mg by mouth every 6 hours as needed             traZODone (DESYREL) 100 MG tablet  TAKE ONE TABLET BY MOUTH NIGHTLY             valACYclovir (VALTREX) 500 MG tablet  Take 1 tablet by mouth 2 times daily as needed             vitamin D (ERGOCALCIFEROL) 1.25 MG (40549 UT) CAPS capsule  Take 1 capsule by mouth once a week                 Medications marked \"taking\" at this time  Outpatient Medications Marked as Taking for the 4/26/21 encounter (Office Visit) with Bernabe Rodriguez PA-C   Medication Sig Dispense Refill    vitamin D (ERGOCALCIFEROL) 1.25 MG (62660 UT) CAPS capsule Take 1 capsule by mouth once a week 4 capsule 11    albuterol sulfate HFA (VENTOLIN HFA) 108 (90 Base) MCG/ACT inhaler Inhale 1 puff into the lungs every 4 hours as needed for Wheezing or Shortness of Breath 2 Inhaler 5    sulfamethoxazole-trimethoprim (BACTRIM DS) 800-160 MG per tablet Take 1 tablet by mouth 2 times daily for 10 days 20 tablet 0    fluticasone-salmeterol (ADVAIR) 100-50 MCG/DOSE diskus inhaler Inhale 1 puff into the lungs every 12 hours RINSE MOUTH OUT AFTER USE. 60 each 3    fexofenadine (ALLEGRA ALLERGY) 180 MG tablet Take 180 mg by mouth 2 times daily      traMADol HCl 100 MG TABS Take 100 mg by mouth every 6 hours as needed      aspirin 325 MG tablet Take 325 mg by mouth daily      citalopram (CELEXA) 10 MG tablet Take 1 tablet by mouth daily 30 tablet 3    rosuvastatin (CRESTOR) 5 MG tablet TAKE ONE TABLET BY MOUTH EVERY DAY 30 tablet 11    solifenacin (VESICARE) 10 MG tablet TAKE ONE TABLET BY MOUTH EVERY DAY 30 tablet 5    pantoprazole (PROTONIX) 40 MG tablet Take 1 tablet by mouth every morning (before breakfast) 90 tablet 1    levocetirizine (XYZAL) 5 MG tablet Take 1 tablet by mouth nightly 90 tablet 1    traZODone (DESYREL) 100 MG tablet TAKE ONE TABLET BY MOUTH NIGHTLY 30 tablet 5    meloxicam (MOBIC) 15 MG tablet TAKE ONE TABLET BY MOUTH EVERY DAY 30 tablet 0    hydrochlorothiazide (HYDRODIURIL) 25 MG tablet Take 1 tablet by mouth 2 times daily 60 tablet 11    valACYclovir (VALTREX) 500 MG tablet Take 1 tablet by mouth 2 times daily as needed 20 tablet 0        Medications patient taking as of now reconciled against medications ordered at time of hospital discharge: Yes    Chief Complaint   Patient presents with    Rash     3 month follow up Patient has a rash on the left leg which has spread to right leg and face patient is followed at the [facility] derm Dr. Mary Beth Paul from [address] 4/20/2021       HPI  Inpatient course: Discharge summary reviewed- see chart. Interval history/Current status:   Admitted to [facility] 4/20/2021 for RUQ pain. She had lap cholecystectomy with Dr. Dana Vazquez on 4/20/2021. Discharged same day; no complications from surgery. Doing well post-op. Pain control is fair. Denies worsening constipation. Has post-op on 5/6/21 with Dr. Dana Vazquez. Keep incision sites covered.    Still having some RUQ pain/discomfort. Dysuria. C/o burning and frequency. Symptoms were present prior to surgery on 4/20. Denies hematuria. No vaginal discharge/itching. Recurrent rash. History of recurrent rash of LLE. Has been under the care of [facility] dermatology, Dr. Analisa Cox. Last OV 3/31/2021. Diagnosed with \"dermal hypersensitivity reaction\". Was told that her lexapro medication is causing recurrent rash. We have titrated patient off of lexapro and started her on celexa. Has had no improvement of rash. Rash is now present of BLE. She is taking allegra BID and xyzal nightly. Labs recently ordered for possible evaluation of bullous pemphigoid. They discussed patient started methotrexate therapy--she has not started yet. Chronic fatigue. Positive for covid in November, 2020. Since that time, she has had persistent fatigue/tiredness. Feels like symptoms are worsening. Persistent, moderate asthma. C/o worsening wheezing, SOB. History of PFTs with Dr. Afia Hernandez in 2016. PFTs showed mild obstructive impairment with complete reversibility with bronchodilator therapy. Initially was started on breo, but she did not like medication. Current medication includes albuterol inhaler--using at least 1-2 times daily. Tremor. C/o RUE tremor. Reports that tremor has been present \"for some time\" but has since seemed to worsen. Family history of parkinson's disease. Notes that tremor worsens with activity. Denies new onset/worsening HA/vision changes. Review of Systems   Constitutional: Positive for fatigue and unexpected weight change (gain). Negative for activity change, appetite change, chills, diaphoresis and fever. HENT: Negative for congestion. Respiratory: Negative for cough, chest tightness, shortness of breath and wheezing. Cardiovascular: Negative for chest pain, palpitations and leg swelling. Gastrointestinal: Positive for abdominal pain.  Negative for abdominal distention, constipation, diarrhea, nausea, rectal pain and vomiting. Genitourinary: Negative for dysuria. Skin: Positive for rash and wound. Negative for color change. Neurological: Negative for dizziness, tremors, weakness, light-headedness, numbness and headaches. Psychiatric/Behavioral: Negative for dysphoric mood and sleep disturbance. The patient is not nervous/anxious. Vitals:    04/26/21 0914   BP: 130/78   Pulse: 80   Resp: 18   Temp: 97.7 °F (36.5 °C)   TempSrc: Temporal   SpO2: 96%   Weight: 268 lb (121.6 kg)   Height: 5' 2.5\" (1.588 m)     Body mass index is 48.24 kg/m². Wt Readings from Last 3 Encounters:   04/26/21 268 lb (121.6 kg)   04/20/21 258 lb 9 oz (117.3 kg)   04/14/21 258 lb 9.6 oz (117.3 kg)     BP Readings from Last 3 Encounters:   04/26/21 130/78   04/20/21 128/61   04/20/21 105/70     Physical Exam  Vitals signs reviewed. Constitutional:       General: She is not in acute distress. Appearance: She is obese. She is not ill-appearing, toxic-appearing or diaphoretic. HENT:      Head: Normocephalic and atraumatic. Right Ear: External ear normal.      Left Ear: External ear normal.      Nose: Nose normal.      Mouth/Throat:      Mouth: Mucous membranes are moist.   Eyes:      Conjunctiva/sclera: Conjunctivae normal.   Cardiovascular:      Rate and Rhythm: Normal rate and regular rhythm. Pulmonary:      Effort: Pulmonary effort is normal. No respiratory distress. Breath sounds: Normal breath sounds. No stridor. No wheezing, rhonchi or rales. Abdominal:      General: Bowel sounds are normal.      Palpations: Abdomen is soft. Tenderness: There is abdominal tenderness. Skin:     General: Skin is warm and dry. Coloration: Skin is not pale. Findings: Rash and wound present. No erythema. Neurological:      Mental Status: She is alert. Motor: Tremor (RUE, noted on exam) present.  No weakness, atrophy, abnormal muscle tone or pronator drift. Coordination: Coordination is intact. Coordination normal.      Gait: Gait is intact. Psychiatric:         Attention and Perception: Attention normal.         Mood and Affect: Mood normal.         Speech: Speech normal.         Behavior: Behavior normal.         Thought Content: Thought content normal.         Cognition and Memory: Cognition normal.         Judgment: Judgment normal.      Comments: Reports worsening fatigue/tiredness. Feels like symptoms have worsened post-COVID. She is taking triple antihistamine therapy for chronic rash. Assessment/Plan:  1. S/P cholecystectomy  Has follow up 5/6 with general surgery. Keep stools soft. - [place] DISCHARGE MEDS RECONCILED W/ CURRENT OUTPATIENT MED LIST    2. Hospital discharge follow-up  Reviewed. - [place] DISCHARGE MEDS RECONCILED W/ CURRENT OUTPATIENT MED LIST    3. Vitamin D deficiency    - vitamin D (ERGOCALCIFEROL) 1.25 MG (72626 UT) CAPS capsule; Take 1 capsule by mouth once a week  Dispense: 4 capsule; Refill: 11  - Vitamin D 25 Hydroxy; Future    4. Moderate persistent asthma without complication    - albuterol sulfate HFA (VENTOLIN HFA) 108 (90 Base) MCG/ACT inhaler; Inhale 1 puff into the lungs every 4 hours as needed for Wheezing or Shortness of Breath  Dispense: 2 Inhaler; Refill: 5  - fluticasone-salmeterol (ADVAIR) 100-50 MCG/DOSE diskus inhaler; Inhale 1 puff into the lungs every 12 hours RINSE MOUTH OUT AFTER USE. Dispense: 60 each; Refill: 3    5. Dysuria  Start bactrim ds. Change if needed based on culture. - POCT Urinalysis no Micro  - Culture, Urine; Future    6. Chronic fatigue  Labs today for evaluation.   - TSH Without Reflex; Future  - Vitamin B12 & Folate; Future  - KATHY Screen With Reflex; Future    7. Tremor of right hand  Discussed neurology referral.  Patient would like to wait until MRI is completed. - MRI BRAIN WO CONTRAST; Future    8. Family history of Parkinson disease    - MRI BRAIN WO CONTRAST;  Future 9. Encounter for screening mammogram for malignant neoplasm of breast    - FRANTZ DIGITAL SCREEN W OR WO CAD BILATERAL; Future    10. BMI 45.0-49.9, adult [facility])  Discussed dietary changes/increasing exercise as tolerated as she heals from surgery. 11. Anxiety and depression  Tolerating celexa. 12. Rash and nonspecific skin eruption. On antihistamine. Continues with [facility] dermatology. Discussed second opinion referral.    6 week follow up with me. Medical Decision Making: moderate complexity    Milagros Huang PA-C

## 2021-04-28 LAB
ANA IGG, ELISA: DETECTED
ANA PATTERN: ABNORMAL
ANA TITER: ABNORMAL
ANTINUCLEAR AB INTERPRETIVE COMMENT: ABNORMAL
ANTINUCLEAR ANTIBODY, HEP-2, IGG: DETECTED

## 2021-04-29 LAB
ANTI JO-1 IGG: 0 AU/ML (ref 0–40)
DOUBLE STRANDED DNA AB, IGG: NORMAL
ENA TO SMITH (SM) ANTIBODY: 3 AU/ML (ref 0–40)
ENA TO SSB (LA) ANTIBODY: 0 AU/ML (ref 0–40)
SCLERODERMA (SCL-70) AB: 0 AU/ML (ref 0–40)
SSA 52 (RO) (ENA) AB, IGG: 1 AU/ML (ref 0–40)
SSA 60 (RO) (ENA) AB, IGG: 1 AU/ML (ref 0–40)

## 2021-04-30 LAB — ENA TO RNP ANTIBODY: NORMAL

## 2021-05-05 ENCOUNTER — HOSPITAL ENCOUNTER (OUTPATIENT)
Dept: WOMENS IMAGING | Age: 66
Discharge: HOME | End: 2021-05-07
Payer: COMMERCIAL

## 2021-05-05 DIAGNOSIS — Z12.31 ENCOUNTER FOR SCREENING MAMMOGRAM FOR MALIGNANT NEOPLASM OF BREAST: ICD-10-CM

## 2021-05-05 PROCEDURE — 77067 SCR MAMMO BI INCL CAD: CPT

## 2021-05-06 ENCOUNTER — OFFICE VISIT (OUTPATIENT)
Dept: SURGERY | Age: 66
End: 2021-05-06

## 2021-05-06 VITALS
DIASTOLIC BLOOD PRESSURE: 80 MMHG | SYSTOLIC BLOOD PRESSURE: 116 MMHG | WEIGHT: 261 LBS | BODY MASS INDEX: 46.25 KG/M2 | HEIGHT: 63 IN | TEMPERATURE: 97.5 F

## 2021-05-06 DIAGNOSIS — R10.11 RIGHT UPPER QUADRANT ABDOMINAL PAIN: Primary | ICD-10-CM

## 2021-05-06 PROCEDURE — 99024 POSTOP FOLLOW-UP VISIT: CPT | Performed by: SURGERY

## 2021-05-06 RX ORDER — IRON HEME POLYPEPTIDE/FOLIC AC 12-1MG
5000 TABLET ORAL DAILY
Qty: 90 CAPSULE | Refills: 4 | Status: SHIPPED | OUTPATIENT
Start: 2021-05-06 | End: 2021-05-18 | Stop reason: SDUPTHER

## 2021-05-06 NOTE — PROGRESS NOTES
Javad Paula (:  1955) is a 60 y.o. female,Established patient, here for evaluation of the following chief complaint(s):  Post-Op Check (post op lap seymour with cholangiograms)      ASSESSMENT/PLAN:  Doing well    Return to see me on a as needed basis    SUBJECTIVE/OBJECTIVE:  HPI    Javad Paula returns in follow up of laparoscopic cholecystectomy with cholangiograms performed on 2021. Intra-operative cholangiogram was normal .The pathology report shows chronic acalculous cholecystitis. Pain is rated as a 2. Her appetite is Good. The patient denies juandice, fever, chills or sweats. The patient has returned to normal activities. Review of Systems    Physical Exam  Constitutional:       General: She is not in acute distress. Appearance: Normal appearance. HENT:      Mouth/Throat:      Mouth: Mucous membranes are moist.      Pharynx: Oropharynx is clear. Eyes:      Pupils: Pupils are equal, round, and reactive to light. Neck:      Comments: Neck is supple with out masses, no thyromegaly, trachea midline  Abdominal:      Comments: Incisions are well approximated, erythema is not present, swelling is mild, no evidence of hernia, tenderness is mild, drainage is not present, skin glue/sutures present   Musculoskeletal:      Comments: Normal gait   Skin:     Findings: No bruising, lesion or rash. Neurological:      Mental Status: She is alert and oriented to person, place, and time. Psychiatric:         Mood and Affect: Mood normal.         Judgment: Judgment normal.       /80   Temp 97.5 °F (36.4 °C) (Temporal)   Ht 5' 3\" (1.6 m)   Wt 261 lb (118.4 kg)   BMI 46.23 kg/m²           An electronic signature was used to authenticate this note.     --Karolina Werner MD

## 2021-05-18 ENCOUNTER — TELEPHONE (OUTPATIENT)
Dept: FAMILY MEDICINE CLINIC | Age: 66
End: 2021-05-18

## 2021-05-18 ENCOUNTER — HOSPITAL ENCOUNTER (OUTPATIENT)
Dept: MRI IMAGING | Age: 66
Discharge: HOME | End: 2021-05-20
Payer: COMMERCIAL

## 2021-05-18 DIAGNOSIS — Z82.0 FAMILY HISTORY OF PARKINSON DISEASE: ICD-10-CM

## 2021-05-18 DIAGNOSIS — R25.1 TREMOR OF RIGHT HAND: ICD-10-CM

## 2021-05-18 DIAGNOSIS — N32.81 OVERACTIVE BLADDER: ICD-10-CM

## 2021-05-18 DIAGNOSIS — E78.00 PURE HYPERCHOLESTEROLEMIA: ICD-10-CM

## 2021-05-18 DIAGNOSIS — J45.40 MODERATE PERSISTENT ASTHMA WITHOUT COMPLICATION: ICD-10-CM

## 2021-05-18 DIAGNOSIS — F32.A ANXIETY AND DEPRESSION: ICD-10-CM

## 2021-05-18 DIAGNOSIS — F41.9 ANXIETY AND DEPRESSION: ICD-10-CM

## 2021-05-18 PROCEDURE — 70551 MRI BRAIN STEM W/O DYE: CPT

## 2021-05-18 RX ORDER — IRON HEME POLYPEPTIDE/FOLIC AC 12-1MG
5000 TABLET ORAL DAILY
Qty: 90 CAPSULE | Refills: 4 | Status: SHIPPED | OUTPATIENT
Start: 2021-05-18 | End: 2021-06-24 | Stop reason: SDUPTHER

## 2021-05-18 RX ORDER — SOLIFENACIN SUCCINATE 10 MG/1
TABLET, FILM COATED ORAL
Qty: 90 TABLET | Refills: 4 | Status: SHIPPED | OUTPATIENT
Start: 2021-05-18 | End: 2022-06-27 | Stop reason: SDUPTHER

## 2021-05-18 RX ORDER — CITALOPRAM 10 MG/1
10 TABLET ORAL DAILY
Qty: 90 TABLET | Refills: 4 | Status: SHIPPED | OUTPATIENT
Start: 2021-05-18 | End: 2022-01-21 | Stop reason: ALTCHOICE

## 2021-05-18 RX ORDER — ROSUVASTATIN CALCIUM 5 MG/1
5 TABLET, COATED ORAL DAILY
Qty: 90 TABLET | Refills: 4 | Status: SHIPPED | OUTPATIENT
Start: 2021-05-18 | End: 2022-07-20

## 2021-05-18 NOTE — TELEPHONE ENCOUNTER
Patient called in stating pharmacy has the dialyvite vitamin D on back order and they cannot say when it will be in stock.  She needs [facility]

## 2021-05-20 ENCOUNTER — TELEPHONE (OUTPATIENT)
Dept: FAMILY MEDICINE CLINIC | Age: 66
End: 2021-05-20

## 2021-05-20 DIAGNOSIS — L28.2 PRURITIC RASH: ICD-10-CM

## 2021-05-20 NOTE — TELEPHONE ENCOUNTER
Pt is asking if pcp will send a script over for a rash she has on her legs. The rash is red and raised. She states pcp is aware of what rash this is because she has had it for 2 years. Please advise and thank you.     [facility]

## 2021-05-21 RX ORDER — HYDROXYZINE 50 MG/1
50 TABLET, FILM COATED ORAL EVERY 8 HOURS PRN
Qty: 30 TABLET | Refills: 2 | Status: SHIPPED | OUTPATIENT
Start: 2021-05-21 | End: 2021-09-08 | Stop reason: SDUPTHER

## 2021-05-21 RX ORDER — CLOBETASOL PROPIONATE 0.5 MG/G
CREAM TOPICAL
Qty: 60 G | Refills: 1 | Status: SHIPPED | OUTPATIENT
Start: 2021-05-21 | End: 2022-05-23 | Stop reason: ALTCHOICE

## 2021-05-26 LAB
BILIRUBIN, URINE: NEGATIVE
BLOOD, URINE: NEGATIVE
CLARITY: CLEAR
COLOR: YELLOW
GLUCOSE URINE: NEGATIVE
KETONES, URINE: NEGATIVE
LEUKOCYTE ESTERASE, URINE: NEGATIVE
NITRITE, URINE: NEGATIVE
PH UA: 5 (ref 4.5–8)
PROTEIN UA: NEGATIVE
SPECIFIC GRAVITY, URINE: 1.02
UROBILINOGEN, URINE: NORMAL

## 2021-06-21 ENCOUNTER — OFFICE VISIT (OUTPATIENT)
Dept: FAMILY MEDICINE CLINIC | Age: 66
End: 2021-06-21
Payer: COMMERCIAL

## 2021-06-21 VITALS
RESPIRATION RATE: 16 BRPM | OXYGEN SATURATION: 95 % | HEIGHT: 63 IN | SYSTOLIC BLOOD PRESSURE: 120 MMHG | TEMPERATURE: 97.5 F | DIASTOLIC BLOOD PRESSURE: 80 MMHG | HEART RATE: 72 BPM | BODY MASS INDEX: 46.07 KG/M2 | WEIGHT: 260 LBS

## 2021-06-21 DIAGNOSIS — R21 RASH AND NONSPECIFIC SKIN ERUPTION: ICD-10-CM

## 2021-06-21 DIAGNOSIS — E78.00 PURE HYPERCHOLESTEROLEMIA: ICD-10-CM

## 2021-06-21 DIAGNOSIS — L30.9 DERMATITIS: ICD-10-CM

## 2021-06-21 DIAGNOSIS — E55.9 VITAMIN D DEFICIENCY: ICD-10-CM

## 2021-06-21 DIAGNOSIS — M85.851 OSTEOPENIA OF RIGHT HIP: ICD-10-CM

## 2021-06-21 DIAGNOSIS — M25.551 CHRONIC RIGHT HIP PAIN: ICD-10-CM

## 2021-06-21 DIAGNOSIS — G89.29 CHRONIC RIGHT HIP PAIN: ICD-10-CM

## 2021-06-21 LAB
CHOLESTEROL, TOTAL: 136 MG/DL (ref 0–199)
HDLC SERPL-MCNC: 58 MG/DL (ref 40–59)
LDL CHOLESTEROL CALCULATED: 60 MG/DL (ref 0–129)
TRIGL SERPL-MCNC: 91 MG/DL (ref 0–150)

## 2021-06-21 PROCEDURE — G8427 DOCREV CUR MEDS BY ELIG CLIN: HCPCS | Performed by: PHYSICIAN ASSISTANT

## 2021-06-21 PROCEDURE — G8417 CALC BMI ABV UP PARAM F/U: HCPCS | Performed by: PHYSICIAN ASSISTANT

## 2021-06-21 PROCEDURE — 1036F TOBACCO NON-USER: CPT | Performed by: PHYSICIAN ASSISTANT

## 2021-06-21 PROCEDURE — 99214 OFFICE O/P EST MOD 30 MIN: CPT | Performed by: PHYSICIAN ASSISTANT

## 2021-06-21 PROCEDURE — 3017F COLORECTAL CA SCREEN DOC REV: CPT | Performed by: PHYSICIAN ASSISTANT

## 2021-06-21 SDOH — ECONOMIC STABILITY: FOOD INSECURITY: WITHIN THE PAST 12 MONTHS, YOU WORRIED THAT YOUR FOOD WOULD RUN OUT BEFORE YOU GOT MONEY TO BUY MORE.: NEVER TRUE

## 2021-06-21 SDOH — ECONOMIC STABILITY: FOOD INSECURITY: WITHIN THE PAST 12 MONTHS, THE FOOD YOU BOUGHT JUST DIDN'T LAST AND YOU DIDN'T HAVE MONEY TO GET MORE.: NEVER TRUE

## 2021-06-21 ASSESSMENT — ENCOUNTER SYMPTOMS
RECTAL PAIN: 0
WHEEZING: 0
CHEST TIGHTNESS: 0
COLOR CHANGE: 0
CONSTIPATION: 0
ABDOMINAL DISTENTION: 0
DIARRHEA: 0
NAUSEA: 0
SHORTNESS OF BREATH: 0
VOMITING: 0
COUGH: 0
BACK PAIN: 1
ABDOMINAL PAIN: 0

## 2021-06-21 ASSESSMENT — SOCIAL DETERMINANTS OF HEALTH (SDOH): HOW HARD IS IT FOR YOU TO PAY FOR THE VERY BASICS LIKE FOOD, HOUSING, MEDICAL CARE, AND HEATING?: NOT HARD AT ALL

## 2021-06-23 ENCOUNTER — HOSPITAL ENCOUNTER (OUTPATIENT)
Dept: GENERAL RADIOLOGY | Age: 66
Discharge: HOME | End: 2021-06-25
Payer: COMMERCIAL

## 2021-06-23 ENCOUNTER — HOSPITAL ENCOUNTER (OUTPATIENT)
Age: 66
Discharge: HOME | End: 2021-06-25
Payer: COMMERCIAL

## 2021-06-23 DIAGNOSIS — M25.551 CHRONIC RIGHT HIP PAIN: ICD-10-CM

## 2021-06-23 DIAGNOSIS — G89.29 CHRONIC RIGHT HIP PAIN: ICD-10-CM

## 2021-06-23 LAB
INSULIN FREE: 58 UIU/ML (ref 3–19)
INSULIN: 71 UIU/ML (ref 3–19)
VITAMIN D 25-HYDROXY: 27.2 NG/ML (ref 30–100)

## 2021-06-23 PROCEDURE — 73502 X-RAY EXAM HIP UNI 2-3 VIEWS: CPT

## 2021-06-24 RX ORDER — IRON HEME POLYPEPTIDE/FOLIC AC 12-1MG
5000 TABLET ORAL DAILY
Qty: 90 CAPSULE | Refills: 4 | Status: SHIPPED | OUTPATIENT
Start: 2021-06-24 | End: 2021-06-25 | Stop reason: ALTCHOICE

## 2021-06-25 ENCOUNTER — TELEPHONE (OUTPATIENT)
Dept: FAMILY MEDICINE CLINIC | Age: 66
End: 2021-06-25

## 2021-06-25 DIAGNOSIS — E88.81 INSULIN RESISTANCE: ICD-10-CM

## 2021-06-25 DIAGNOSIS — E55.9 VITAMIN D DEFICIENCY: Primary | ICD-10-CM

## 2021-06-25 RX ORDER — CHOLECALCIFEROL (VITAMIN D3) 125 MCG
CAPSULE ORAL
Qty: 90 CAPSULE | Refills: 4 | Status: SHIPPED | OUTPATIENT
Start: 2021-06-25 | End: 2022-08-31

## 2021-06-25 RX ORDER — CHOLECALCIFEROL (VITAMIN D3) 125 MCG
CAPSULE ORAL
Qty: 90 CAPSULE | Refills: 4 | Status: SHIPPED | OUTPATIENT
Start: 2021-06-25 | End: 2021-06-25 | Stop reason: SDUPTHER

## 2021-06-25 NOTE — TELEPHONE ENCOUNTER
Patient calling because her scripts were sent to [facility] instead of [facility]. She is requesting they be resent to the correct pharmacy. Pharmacy: [facility]    Thank you.

## 2021-06-25 NOTE — TELEPHONE ENCOUNTER
Pt called in and said [facility] told her she needs another brand of vit D sent over because they do not carry the brand you sent. Also the metformin does not come in 250mg. They said the next dosage would be 500mg and she could cut it in half and take it 2xs a day.      Please advise and thank you

## 2021-08-12 ENCOUNTER — PATIENT MESSAGE (OUTPATIENT)
Dept: FAMILY MEDICINE CLINIC | Age: 66
End: 2021-08-12

## 2021-08-12 RX ORDER — MELOXICAM 15 MG/1
TABLET ORAL
Qty: 90 TABLET | Refills: 1 | Status: SHIPPED | OUTPATIENT
Start: 2021-08-12 | End: 2022-01-21

## 2021-08-12 NOTE — TELEPHONE ENCOUNTER
From: Mikel Garcia  To: Josefina Roberto PA-C  Sent: 8/12/2021 11:45 AM EDT  Subject: Prescription Question    Rik Nieto, I am out of my prn Mobic because Ive been taking it daily. Could you please send a refill [facility]? NOT [facility]. Mobic 15mg daily please.  Its for my lt foot pain  Thank you  Josee Moctezuma

## 2021-09-08 RX ORDER — HYDROXYZINE 50 MG/1
50 TABLET, FILM COATED ORAL EVERY 8 HOURS PRN
Qty: 30 TABLET | Refills: 2 | Status: SHIPPED | OUTPATIENT
Start: 2021-09-08 | End: 2021-09-21 | Stop reason: SDUPTHER

## 2021-09-08 NOTE — TELEPHONE ENCOUNTER
Rx request   Requested Prescriptions     Pending Prescriptions Disp Refills    hydrOXYzine (ATARAX) 50 MG tablet 30 tablet 2     Sig: Take 1 tablet by mouth every 8 hours as needed for Itching     LOV 6/21/2021  Next Visit Date:  Future Appointments   Date Time Provider Dayne Cullen   1/21/2022 10:00 AM Giovanna Petty PA-C [address]

## 2021-09-20 NOTE — TELEPHONE ENCOUNTER
Patient is requesting medication refill. She states this was filled, but was sent to [facility]. Script needs to be sent to [facility].   Please approve or deny this request.    Rx requested:  Requested Prescriptions     Pending Prescriptions Disp Refills    hydrOXYzine (ATARAX) 50 MG tablet 30 tablet 2     Sig: Take 1 tablet by mouth every 8 hours as needed for Itching         Last Office Visit:   Visit date not found      Next Visit Date:  Future Appointments   Date Time Provider Dayne Cullen   1/21/2022 10:00 AM Layla Robert PA-C [address] [place], [place] [address]taurus Sen

## 2021-09-21 RX ORDER — HYDROXYZINE 50 MG/1
50 TABLET, FILM COATED ORAL EVERY 8 HOURS PRN
Qty: 30 TABLET | Refills: 2 | Status: SHIPPED | OUTPATIENT
Start: 2021-09-21 | End: 2021-10-01

## 2022-01-20 NOTE — TELEPHONE ENCOUNTER
Pharmacy requesting medication refill.  Please approve or deny this request.    Rx requested:  Requested Prescriptions     Pending Prescriptions Disp Refills    meloxicam (MOBIC) 15 MG tablet [Pharmacy Med Name: MELOXICAM TABS 15MG] 90 tablet 3     Sig: TAKE 1 TABLET DAILY         Last Office Visit:   6/21/2021      Next Visit Date:  Future Appointments   Date Time Provider Dayne Cullen   1/21/2022 10:00 AM Timbo Kaye PA-C [address] [place] [address]

## 2022-01-21 ENCOUNTER — OFFICE VISIT (OUTPATIENT)
Dept: FAMILY MEDICINE CLINIC | Age: 67
End: 2022-01-21
Payer: COMMERCIAL

## 2022-01-21 VITALS
SYSTOLIC BLOOD PRESSURE: 120 MMHG | OXYGEN SATURATION: 98 % | WEIGHT: 211 LBS | RESPIRATION RATE: 16 BRPM | HEIGHT: 63 IN | HEART RATE: 60 BPM | DIASTOLIC BLOOD PRESSURE: 82 MMHG | BODY MASS INDEX: 37.39 KG/M2 | TEMPERATURE: 96.2 F

## 2022-01-21 DIAGNOSIS — E88.81 INSULIN RESISTANCE: ICD-10-CM

## 2022-01-21 DIAGNOSIS — E55.9 VITAMIN D DEFICIENCY: ICD-10-CM

## 2022-01-21 DIAGNOSIS — F32.A ANXIETY AND DEPRESSION: ICD-10-CM

## 2022-01-21 DIAGNOSIS — F41.9 ANXIETY AND DEPRESSION: ICD-10-CM

## 2022-01-21 DIAGNOSIS — E88.819 INSULIN RESISTANCE: ICD-10-CM

## 2022-01-21 DIAGNOSIS — G25.81 RLS (RESTLESS LEGS SYNDROME): ICD-10-CM

## 2022-01-21 DIAGNOSIS — R21 RASH AND NONSPECIFIC SKIN ERUPTION: Primary | ICD-10-CM

## 2022-01-21 LAB
ALBUMIN SERPL-MCNC: 4.5 G/DL (ref 3.5–4.6)
ALP BLD-CCNC: 85 U/L (ref 40–130)
ALT SERPL-CCNC: 17 U/L (ref 0–33)
ANION GAP SERPL CALCULATED.3IONS-SCNC: 17 MEQ/L (ref 9–15)
AST SERPL-CCNC: 25 U/L (ref 0–35)
BILIRUB SERPL-MCNC: 0.6 MG/DL (ref 0.2–0.7)
BUN BLDV-MCNC: 19 MG/DL (ref 8–23)
CALCIUM SERPL-MCNC: 10.3 MG/DL (ref 8.5–9.9)
CHLORIDE BLD-SCNC: 102 MEQ/L (ref 95–107)
CO2: 26 MEQ/L (ref 20–31)
CREAT SERPL-MCNC: 1.06 MG/DL (ref 0.5–0.9)
GFR AFRICAN AMERICAN: >60
GFR NON-AFRICAN AMERICAN: 52
GLOBULIN: 3.3 G/DL (ref 2.3–3.5)
GLUCOSE BLD-MCNC: 84 MG/DL (ref 70–99)
HBA1C MFR BLD: 5.7 % (ref 4.8–5.9)
HCT VFR BLD CALC: 40.7 % (ref 37–47)
HEMOGLOBIN: 13.5 G/DL (ref 12–16)
MCH RBC QN AUTO: 29.2 PG (ref 27–31.3)
MCHC RBC AUTO-ENTMCNC: 33.1 % (ref 33–37)
MCV RBC AUTO: 88.1 FL (ref 82–100)
PDW BLD-RTO: 14.2 % (ref 11.5–14.5)
PLATELET # BLD: 202 K/UL (ref 130–400)
POTASSIUM SERPL-SCNC: 5.2 MEQ/L (ref 3.4–4.9)
RBC # BLD: 4.62 M/UL (ref 4.2–5.4)
SODIUM BLD-SCNC: 145 MEQ/L (ref 135–144)
TOTAL PROTEIN: 7.8 G/DL (ref 6.3–8)
WBC # BLD: 7 K/UL (ref 4.8–10.8)

## 2022-01-21 PROCEDURE — 99214 OFFICE O/P EST MOD 30 MIN: CPT | Performed by: PHYSICIAN ASSISTANT

## 2022-01-21 PROCEDURE — G8399 PT W/DXA RESULTS DOCUMENT: HCPCS | Performed by: PHYSICIAN ASSISTANT

## 2022-01-21 PROCEDURE — 3017F COLORECTAL CA SCREEN DOC REV: CPT | Performed by: PHYSICIAN ASSISTANT

## 2022-01-21 PROCEDURE — 4040F PNEUMOC VAC/ADMIN/RCVD: CPT | Performed by: PHYSICIAN ASSISTANT

## 2022-01-21 PROCEDURE — 1090F PRES/ABSN URINE INCON ASSESS: CPT | Performed by: PHYSICIAN ASSISTANT

## 2022-01-21 PROCEDURE — G8417 CALC BMI ABV UP PARAM F/U: HCPCS | Performed by: PHYSICIAN ASSISTANT

## 2022-01-21 PROCEDURE — 1123F ACP DISCUSS/DSCN MKR DOCD: CPT | Performed by: PHYSICIAN ASSISTANT

## 2022-01-21 PROCEDURE — G8484 FLU IMMUNIZE NO ADMIN: HCPCS | Performed by: PHYSICIAN ASSISTANT

## 2022-01-21 PROCEDURE — 1036F TOBACCO NON-USER: CPT | Performed by: PHYSICIAN ASSISTANT

## 2022-01-21 PROCEDURE — G8427 DOCREV CUR MEDS BY ELIG CLIN: HCPCS | Performed by: PHYSICIAN ASSISTANT

## 2022-01-21 RX ORDER — HYDROXYZINE 50 MG/1
TABLET, FILM COATED ORAL
COMMUNITY
Start: 2022-01-07 | End: 2022-02-05

## 2022-01-21 RX ORDER — MELOXICAM 15 MG/1
TABLET ORAL
Qty: 90 TABLET | Refills: 3 | Status: SHIPPED | OUTPATIENT
Start: 2022-01-21 | End: 2022-05-23 | Stop reason: ALTCHOICE

## 2022-01-21 RX ORDER — ESCITALOPRAM OXALATE 20 MG/1
TABLET ORAL
Qty: 90 TABLET | Refills: 4 | Status: SHIPPED | OUTPATIENT
Start: 2022-01-21

## 2022-01-21 RX ORDER — ANORECTAL OINTMENT 15.7; .44; 24; 20.6 G/100G; G/100G; G/100G; G/100G
OINTMENT TOPICAL DAILY
Qty: 113 G | Refills: 2 | Status: SHIPPED | OUTPATIENT
Start: 2022-01-21

## 2022-01-21 RX ORDER — PRAMIPEXOLE DIHYDROCHLORIDE 0.12 MG/1
0.12 TABLET ORAL NIGHTLY
Qty: 90 TABLET | Refills: 3 | Status: SHIPPED | OUTPATIENT
Start: 2022-01-21

## 2022-01-21 ASSESSMENT — ENCOUNTER SYMPTOMS
NAUSEA: 0
COUGH: 0
BACK PAIN: 1
ABDOMINAL PAIN: 0
RECTAL PAIN: 0
VOMITING: 0
COLOR CHANGE: 0
WHEEZING: 0
ABDOMINAL DISTENTION: 0
CONSTIPATION: 0
DIARRHEA: 0
SHORTNESS OF BREATH: 0
CHEST TIGHTNESS: 0

## 2022-01-21 ASSESSMENT — PATIENT HEALTH QUESTIONNAIRE - PHQ9
SUM OF ALL RESPONSES TO PHQ QUESTIONS 1-9: 0
SUM OF ALL RESPONSES TO PHQ QUESTIONS 1-9: 0
8. MOVING OR SPEAKING SO SLOWLY THAT OTHER PEOPLE COULD HAVE NOTICED. OR THE OPPOSITE, BEING SO FIGETY OR RESTLESS THAT YOU HAVE BEEN MOVING AROUND A LOT MORE THAN USUAL: 0
SUM OF ALL RESPONSES TO PHQ9 QUESTIONS 1 & 2: 0
6. FEELING BAD ABOUT YOURSELF - OR THAT YOU ARE A FAILURE OR HAVE LET YOURSELF OR YOUR FAMILY DOWN: 0
2. FEELING DOWN, DEPRESSED OR HOPELESS: 0
SUM OF ALL RESPONSES TO PHQ QUESTIONS 1-9: 0
5. POOR APPETITE OR OVEREATING: 0
10. IF YOU CHECKED OFF ANY PROBLEMS, HOW DIFFICULT HAVE THESE PROBLEMS MADE IT FOR YOU TO DO YOUR WORK, TAKE CARE OF THINGS AT HOME, OR GET ALONG WITH OTHER PEOPLE: 0
1. LITTLE INTEREST OR PLEASURE IN DOING THINGS: 0
7. TROUBLE CONCENTRATING ON THINGS, SUCH AS READING THE NEWSPAPER OR WATCHING TELEVISION: 0
SUM OF ALL RESPONSES TO PHQ QUESTIONS 1-9: 0
3. TROUBLE FALLING OR STAYING ASLEEP: 0
4. FEELING TIRED OR HAVING LITTLE ENERGY: 0
9. THOUGHTS THAT YOU WOULD BE BETTER OFF DEAD, OR OF HURTING YOURSELF: 0

## 2022-01-21 NOTE — PROGRESS NOTES
Subjective  Dennise Dinh, 73 y.o. female presents today with:  Chief Complaint   Patient presents with    Follow-up     general check/follow up     HPI  Summer Gonzales is in the office today for routine follow up. Last OV with me: 6/21/21. Chronic dermatitis/pruritis/rash. Rx for atarax given to patient, since that time. Pruritis of skin, babs BLE has resolved. Reports feeling significantly better. Skin has started to repair/heal.  Has not needed the topical steroid. Applying Eucerin daily to help moisturize skin. Skin is dry in the area.       HLD. Due for routine monitoring. Compliant with statin therapy. Last lipid in great range. Obesity. Actively trying to lose weight. Down about 50 lb from nutritional changes and exercise. Continues with weight watchers. Depression/anxiety. On celexa 10 mg. Was previously on lexapro 20 mg. Would like to discuss restarting. Has felt slightly more irritable and on edge. Leg activity. Has had moments of increased leg activity; worse at night. Review of Systems   Constitutional: Negative for activity change, appetite change, chills, diaphoresis, fatigue, fever and unexpected weight change. HENT: Negative for congestion. Respiratory: Negative for cough, chest tightness, shortness of breath and wheezing. Cardiovascular: Negative for chest pain, palpitations and leg swelling. Gastrointestinal: Negative for abdominal distention, abdominal pain, constipation, diarrhea, nausea, rectal pain and vomiting. Genitourinary: Negative for dysuria. Musculoskeletal: Positive for arthralgias, back pain and gait problem (intermittent). Negative for joint swelling and myalgias. Skin: Negative for color change, rash and wound. Neurological: Negative for dizziness, tremors, weakness, light-headedness, numbness and headaches. Psychiatric/Behavioral: Positive for agitation (increased irritabilty ).  Negative for dysphoric mood and sleep disturbance. The patient is not nervous/anxious. Past Medical History:   Diagnosis Date    Acne     Asthma     Deep vein thrombosis (DVT) (HCC)     right leg    Herniated disc     L4-L5    Hip pain, left     Hyperlipidemia     TMJ (temporomandibular joint disorder)      Past Surgical History:   Procedure Laterality Date    BUNIONECTOMY Right 2019    CHOLECYSTECTOMY, LAPAROSCOPIC N/A 2021    LAPAROSCOPIC CHOLECYSTECTOMY  WITH CHOLANGIOGRAMS performed by Vijaya Hernández MD at [address] [place] [address]     Social History     Socioeconomic History    Marital status:      Spouse name: Not on file    Number of children: Not on file    Years of education: Not on file    Highest education level: Not on file   Occupational History    Not on file   Tobacco Use    Smoking status: Former Smoker     Packs/day: 0.25     Years: 20.00     Pack years: 5.00     Types: Cigarettes     Quit date:      Years since quittin.0    Smokeless tobacco: Never Used   Substance and Sexual Activity    Alcohol use: No    Drug use: No    Sexual activity: Yes   Other Topics Concern    Not on file   Social History Narrative    Not on file     Social Determinants of Health     Financial Resource Strain: Low Risk     Difficulty of Paying Living Expenses: Not hard at all   Food Insecurity: No Food Insecurity    Worried About [facility] [place] in the Last Year: Never true    Marleen of Food in the Last Year: Never true   Transportation Needs:     Lack of Transportation (Medical): Not on file    Lack of Transportation (Non-Medical):  Not on file   Physical Activity:     Days of Exercise per Week: Not on file    Minutes of Exercise per Session: Not on file   Stress:     Feeling of Stress : Not on file   Social Connections:     Frequency of Communication with Friends and Family: Not on file    Frequency of Social Gatherings with Friends and Family: Not on file   Paul Marcus Attends Rastafari Services: Not on file    Active Member of Clubs or Organizations: Not on file    Attends Club or Organization Meetings: Not on file    Marital Status: Not on file   Intimate Partner Violence:     Fear of Current or Ex-Partner: Not on file    Emotionally Abused: Not on file    Physically Abused: Not on file    Sexually Abused: Not on file   Housing Stability:     Unable to Pay for Housing in the Last Year: Not on file    Number of Places Lived in the Last Year: Not on file    Unstable Housing in the Last Year: Not on file     Family History   Problem Relation Age of Onset    Heart Disease Mother     High Blood Pressure Father     Heart Disease Father      Allergies   Allergen Reactions    Augmentin [Amoxicillin-Pot Clavulanate] Diarrhea    Cinnamic Acide     Balsam Rash     equivocal patch test     Linalool Rash     Positive patch test    Methylisothiazolinone Rash     Positive patch test    Other Rash     Positive patch test, fragrance mix     Current Outpatient Medications   Medication Sig Dispense Refill    meloxicam (MOBIC) 15 MG tablet TAKE 1 TABLET DAILY 90 tablet 3    hydrOXYzine (ATARAX) 50 MG tablet       menthol-zinc oxide (CALMOSEPTINE) 0.44-20.6 % OINT ointment Apply topically daily Apply thin layer to rash nightly. 113 g 2    pramipexole (MIRAPEX) 0.125 MG tablet Take 1 tablet by mouth nightly 90 tablet 3    escitalopram (LEXAPRO) 20 MG tablet Take 10 mg initially for 7 days then increase to 20 mg daily by mouth. 90 tablet 4    metFORMIN (GLUCOPHAGE) 500 MG tablet TAKE ONE-HALF (1/2) TABLET TWICE A DAY WITH MEALS 90 tablet 0    ADVAIR DISKUS 100-50 MCG/DOSE diskus inhaler USE 1 INHALATION EVERY 12 HOURS, RINSE MOUTH OUT AFTER USE 3 each 3    Cholecalciferol (VITAMIN D) 125 MCG (5000 UT) CAPS Take 1 capsule by mouth daily for vit D def. 90 capsule 4    Crisaborole 2 % OINT Apply topically twice daily to dermatitis.  100 g 2    clobetasol (TEMOVATE) 0.05 % cream Apply topically 2 times daily. 60 g 1    solifenacin (VESICARE) 10 MG tablet TAKE ONE TABLET BY MOUTH EVERY DAY 90 tablet 4    rosuvastatin (CRESTOR) 5 MG tablet Take 1 tablet by mouth daily 90 tablet 4    albuterol sulfate HFA (VENTOLIN HFA) 108 (90 Base) MCG/ACT inhaler Inhale 1 puff into the lungs every 4 hours as needed for Wheezing or Shortness of Breath 2 Inhaler 5    traMADol HCl 100 MG TABS Take 100 mg by mouth every 6 hours as needed      traZODone (DESYREL) 100 MG tablet TAKE ONE TABLET BY MOUTH NIGHTLY 30 tablet 5    hydrochlorothiazide (HYDRODIURIL) 25 MG tablet Take 1 tablet by mouth 2 times daily 60 tablet 11    valACYclovir (VALTREX) 500 MG tablet Take 1 tablet by mouth 2 times daily as needed 20 tablet 0     No current facility-administered medications for this visit. PMH, Surgical Hx, Family Hx, and Social Hx reviewed and updated. Health Maintenance reviewed. Objective  Vitals:    01/21/22 0952   BP: 120/82   Site: Left Upper Arm   Position: Sitting   Cuff Size: Medium Adult   Pulse: 60   Resp: 16   Temp: 96.2 °F (35.7 °C)   TempSrc: Temporal   SpO2: 98%   Weight: 211 lb (95.7 kg)   Height: 5' 3\" (1.6 m)     BP Readings from Last 3 Encounters:   01/21/22 120/82   06/21/21 120/80   05/06/21 116/80     Wt Readings from Last 3 Encounters:   01/21/22 211 lb (95.7 kg)   06/21/21 260 lb (117.9 kg)   05/06/21 261 lb (118.4 kg)     Physical Exam  Vitals reviewed. Constitutional:       Appearance: Normal appearance. HENT:      Head: Normocephalic and atraumatic. Right Ear: External ear normal.      Left Ear: External ear normal.      Nose: Nose normal.      Mouth/Throat:      Mouth: Mucous membranes are moist.   Cardiovascular:      Rate and Rhythm: Normal rate and regular rhythm. Pulmonary:      Effort: Pulmonary effort is normal.   Skin:     General: Skin is warm and dry. Findings: No rash.       Comments: Patch of xerosis of LLE noted on exam.  Rash has significantly improved. Neurological:      General: No focal deficit present. Mental Status: She is alert and oriented to person, place, and time. Psychiatric:         Mood and Affect: Mood normal.         Behavior: Behavior normal.         Thought Content: Thought content normal.         Judgment: Judgment normal.       Assessment & Plan   Abisai Perez was seen today for follow-up. Diagnoses and all orders for this visit:    Rash and nonspecific skin eruption  -     menthol-zinc oxide (CALMOSEPTINE) 0.44-20.6 % OINT ointment; Apply topically daily Apply thin layer to rash nightly. RLS (restless legs syndrome)  -     pramipexole (MIRAPEX) 0.125 MG tablet; Take 1 tablet by mouth nightly    Anxiety and depression  -     escitalopram (LEXAPRO) 20 MG tablet; Take 10 mg initially for 7 days then increase to 20 mg daily by mouth. Insulin resistance  -     Hemoglobin A1C; Future  -     Insulin Free & Total; Future    BMI 37.0-37.9, adult  -     CBC; Future  -     Comprehensive Metabolic Panel; Future  -     Hemoglobin A1C; Future  -     Insulin Free & Total; Future    Vitamin D deficiency  -     Vitamin D 25 Hydroxy; Future    Routine labs today. DOing great, continues to lose weight with lifestyle changes. Overall, feeling really well.  4 month follow up with me. Contact office with any questions or concerns.     Orders Placed This Encounter   Procedures    CBC     Standing Status:   Future     Number of Occurrences:   1     Standing Expiration Date:   1/21/2023    Comprehensive Metabolic Panel     Standing Status:   Future     Number of Occurrences:   1     Standing Expiration Date:   1/21/2023    Hemoglobin A1C     Standing Status:   Future     Number of Occurrences:   1     Standing Expiration Date:   1/21/2023    Vitamin D 25 Hydroxy     Standing Status:   Future     Number of Occurrences:   1     Standing Expiration Date:   1/21/2023    Insulin Free & Total     Standing Status:   Future     Number of Occurrences:   1     Standing Expiration Date:   1/21/2023     Orders Placed This Encounter   Medications    menthol-zinc oxide (CALMOSEPTINE) 0.44-20.6 % OINT ointment     Sig: Apply topically daily Apply thin layer to rash nightly. Dispense:  113 g     Refill:  2    pramipexole (MIRAPEX) 0.125 MG tablet     Sig: Take 1 tablet by mouth nightly     Dispense:  90 tablet     Refill:  3    escitalopram (LEXAPRO) 20 MG tablet     Sig: Take 10 mg initially for 7 days then increase to 20 mg daily by mouth. Dispense:  90 tablet     Refill:  4     CELEXA STOPPED. Medications Discontinued During This Encounter   Medication Reason    aspirin 325 MG tablet LIST CLEANUP    levocetirizine (XYZAL) 5 MG tablet LIST CLEANUP    citalopram (CELEXA) 10 MG tablet Alternate therapy     No follow-ups on file. Reviewed with the patient: current clinical status, medications, activities and diet. Side effects, adverse effects of the medication prescribed today, as well as treatment plan/ rationale and result expectations have been discussed with the patient who expresses understanding and desires to proceed. Close follow up to evaluate treatment results and for coordination of care. I have reviewed the patient's medical history in detail and updated the computerized patient record.     Milagros Huang PA-C

## 2022-01-22 LAB — VITAMIN D 25-HYDROXY: 56.7 NG/ML (ref 30–100)

## 2022-01-23 DIAGNOSIS — E87.0 HYPERNATREMIA: Primary | ICD-10-CM

## 2022-01-23 DIAGNOSIS — E87.5 HYPERKALEMIA: ICD-10-CM

## 2022-01-23 LAB
INSULIN FREE: 17 UIU/ML (ref 3–25)
INSULIN: 24 UIU/ML (ref 3–25)

## 2022-01-28 ENCOUNTER — PATIENT MESSAGE (OUTPATIENT)
Dept: FAMILY MEDICINE CLINIC | Age: 67
End: 2022-01-28

## 2022-01-28 DIAGNOSIS — N28.9 ABNORMAL RENAL FUNCTION: ICD-10-CM

## 2022-01-28 DIAGNOSIS — E87.5 HYPERKALEMIA: ICD-10-CM

## 2022-01-28 DIAGNOSIS — E87.0 HYPERNATREMIA: ICD-10-CM

## 2022-01-28 DIAGNOSIS — E88.81 INSULIN RESISTANCE: Primary | ICD-10-CM

## 2022-01-28 LAB
ANION GAP SERPL CALCULATED.3IONS-SCNC: 13 MEQ/L (ref 9–15)
BUN BLDV-MCNC: 16 MG/DL (ref 8–23)
CALCIUM SERPL-MCNC: 9.7 MG/DL (ref 8.5–9.9)
CHLORIDE BLD-SCNC: 100 MEQ/L (ref 95–107)
CO2: 26 MEQ/L (ref 20–31)
CREAT SERPL-MCNC: 1.17 MG/DL (ref 0.5–0.9)
GFR AFRICAN AMERICAN: 56.1
GFR NON-AFRICAN AMERICAN: 46.4
GLUCOSE BLD-MCNC: 81 MG/DL (ref 70–99)
POTASSIUM SERPL-SCNC: 4.6 MEQ/L (ref 3.4–4.9)
SODIUM BLD-SCNC: 139 MEQ/L (ref 135–144)

## 2022-02-16 DIAGNOSIS — E88.81 INSULIN RESISTANCE: ICD-10-CM

## 2022-02-16 DIAGNOSIS — N28.9 ABNORMAL RENAL FUNCTION: ICD-10-CM

## 2022-02-16 LAB
ANION GAP SERPL CALCULATED.3IONS-SCNC: 12 MEQ/L (ref 9–15)
BUN BLDV-MCNC: 24 MG/DL (ref 8–23)
CALCIUM SERPL-MCNC: 9.3 MG/DL (ref 8.5–9.9)
CHLORIDE BLD-SCNC: 102 MEQ/L (ref 95–107)
CO2: 27 MEQ/L (ref 20–31)
CREAT SERPL-MCNC: 1.13 MG/DL (ref 0.5–0.9)
GFR AFRICAN AMERICAN: 58.4
GFR NON-AFRICAN AMERICAN: 48.3
GLUCOSE BLD-MCNC: 134 MG/DL (ref 70–99)
POTASSIUM SERPL-SCNC: 4.7 MEQ/L (ref 3.4–4.9)
SODIUM BLD-SCNC: 141 MEQ/L (ref 135–144)

## 2022-02-18 LAB
INSULIN FREE: 47 UIU/ML (ref 3–25)
INSULIN: 59 UIU/ML (ref 3–25)

## 2022-02-26 DIAGNOSIS — E88.81 INSULIN RESISTANCE: ICD-10-CM

## 2022-02-26 DIAGNOSIS — N28.9 ABNORMAL RENAL FUNCTION: Primary | ICD-10-CM

## 2022-02-28 DIAGNOSIS — E88.81 INSULIN RESISTANCE: ICD-10-CM

## 2022-02-28 NOTE — TELEPHONE ENCOUNTER
Future Appointments    Encounter Information    Provider Department Appt Notes   5/23/2022 Robert Fuentes PA-C [facility] Primary [facility] 4mo fu       Past Visits    Date Provider Specialty Visit Type Primary Dx   01/21/2022 Robert Fuentes PA-C Family Medicine Office Visit Rash and nonspecific skin eruption   06/21/2021 Robert Fuentes PA-C Family Medicine Office Visit BMI 45.0-49.9, adult [facility])   05/06/2021 Pricilla Ledezma MD General Surgery Office Visit Right upper quadrant abdominal pain   04/26/2021 Robert Fuentes PA-C Family Medicine Office Visit S/P cholecystectomy   04/20/2021 Pricilla Ledezma MD IP Unit Surgery

## 2022-03-31 RX ORDER — HYDROXYZINE 50 MG/1
TABLET, FILM COATED ORAL
Qty: 90 TABLET | Refills: 11 | Status: SHIPPED | OUTPATIENT
Start: 2022-03-31

## 2022-04-08 NOTE — PROGRESS NOTES
Gastrointestinal: Negative for abdominal distention, abdominal pain, constipation, diarrhea, nausea, rectal pain and vomiting. Genitourinary: Negative for dysuria. Musculoskeletal: Positive for arthralgias, back pain and gait problem (intermittent). Negative for joint swelling and myalgias. Skin: Negative for color change, rash and wound. Neurological: Negative for dizziness, tremors, weakness, light-headedness, numbness and headaches. Psychiatric/Behavioral: Negative for dysphoric mood and sleep disturbance. The patient is not nervous/anxious.       Past Medical History:   Diagnosis Date    Acne     Asthma     Deep vein thrombosis (DVT) (HCC)     right leg    Herniated disc     L4-L5    Hip pain, left     Hyperlipidemia     TMJ (temporomandibular joint disorder)      Past Surgical History:   Procedure Laterality Date    BUNIONECTOMY Right 2019    CHOLECYSTECTOMY, LAPAROSCOPIC N/A 2021    LAPAROSCOPIC CHOLECYSTECTOMY  WITH CHOLANGIOGRAMS performed by Kylie Gonzales MD at [address] [place] [address]     Social History     Socioeconomic History    Marital status:      Spouse name: Not on file    Number of children: Not on file    Years of education: Not on file    Highest education level: Not on file   Occupational History    Not on file   Tobacco Use    Smoking status: Former Smoker     Packs/day: 0.25     Years: 20.00     Pack years: 5.00     Types: Cigarettes     Quit date:      Years since quittin.4    Smokeless tobacco: Never Used   Substance and Sexual Activity    Alcohol use: No    Drug use: No    Sexual activity: Yes   Other Topics Concern    Not on file   Social History Narrative    Not on file     Social Determinants of Health     Financial Resource Strain: Low Risk     Difficulty of Paying Living Expenses: Not hard at all   Food Insecurity: No Food Insecurity    Worried About [facility] in the Last Year: Never true    Marleen of Food in the Last Year: Never true   Transportation Needs:     Lack of Transportation (Medical):  Lack of Transportation (Non-Medical):    Physical Activity:     Days of Exercise per Week:     Minutes of Exercise per Session:    Stress:     Feeling of Stress :    Social Connections:     Frequency of Communication with Friends and Family:     Frequency of Social Gatherings with Friends and Family:     Attends Restorationist Services:     Active Member of Clubs or Organizations:     Attends Club or Organization Meetings:     Marital Status:    Intimate Partner Violence:     Fear of Current or Ex-Partner:     Emotionally Abused:     Physically Abused:     Sexually Abused:      Family History   Problem Relation Age of Onset    Heart Disease Mother     High Blood Pressure Father     Heart Disease Father      Allergies   Allergen Reactions    Augmentin [Amoxicillin-Pot Clavulanate] Diarrhea    Cinnamic Acide     Balsam Rash     equivocal patch test     Linalool Rash     Positive patch test    Methylisothiazolinone Rash     Positive patch test    Other Rash     Positive patch test, fragrance mix     Current Outpatient Medications   Medication Sig Dispense Refill    Crisaborole 2 % OINT Apply topically twice daily to dermatitis. 100 g 2    clobetasol (TEMOVATE) 0.05 % cream Apply topically 2 times daily. 60 g 1    vitamin D (DIALYVITE VITAMIN D 5000) 125 MCG (5000 UT) CAPS capsule Take 1 capsule by mouth daily 90 capsule 4    citalopram (CELEXA) 10 MG tablet Take 1 tablet by mouth daily 90 tablet 4    solifenacin (VESICARE) 10 MG tablet TAKE ONE TABLET BY MOUTH EVERY DAY 90 tablet 4    rosuvastatin (CRESTOR) 5 MG tablet Take 1 tablet by mouth daily 90 tablet 4    fluticasone-salmeterol (ADVAIR) 100-50 MCG/DOSE diskus inhaler Inhale 1 puff into the lungs every 12 hours RINSE MOUTH OUT AFTER USE.  60 each 3    albuterol sulfate HFA (VENTOLIN HFA) 108 (90 Base) MCG/ACT inhaler Inhale 1 puff into the lungs every 4 hours as needed for Wheezing or Shortness of Breath 2 Inhaler 5    traMADol HCl 100 MG TABS Take 100 mg by mouth every 6 hours as needed      aspirin 325 MG tablet Take 325 mg by mouth daily      levocetirizine (XYZAL) 5 MG tablet Take 1 tablet by mouth nightly 90 tablet 1    traZODone (DESYREL) 100 MG tablet TAKE ONE TABLET BY MOUTH NIGHTLY 30 tablet 5    meloxicam (MOBIC) 15 MG tablet TAKE ONE TABLET BY MOUTH EVERY DAY 30 tablet 0    hydrochlorothiazide (HYDRODIURIL) 25 MG tablet Take 1 tablet by mouth 2 times daily 60 tablet 11    valACYclovir (VALTREX) 500 MG tablet Take 1 tablet by mouth 2 times daily as needed 20 tablet 0     No current facility-administered medications for this visit. PMH, Surgical Hx, Family Hx, and Social Hx reviewed and updated. Health Maintenance reviewed. Objective  Vitals:    06/21/21 0931   BP: 120/80   Site: Left Upper Arm   Position: Sitting   Cuff Size: Large Adult   Pulse: 72   Resp: 16   Temp: 97.5 °F (36.4 °C)   TempSrc: Temporal   SpO2: 95%   Weight: 260 lb (117.9 kg)   Height: 5' 3\" (1.6 m)     BP Readings from Last 3 Encounters:   06/21/21 120/80   05/06/21 116/80   04/26/21 130/78     Wt Readings from Last 3 Encounters:   06/21/21 260 lb (117.9 kg)   05/06/21 261 lb (118.4 kg)   04/26/21 268 lb (121.6 kg)     Physical Exam  Vitals reviewed. Constitutional:       Appearance: She is obese. HENT:      Head: Normocephalic and atraumatic. Right Ear: External ear normal.      Left Ear: External ear normal.      Nose: Nose normal.      Mouth/Throat:      Mouth: Mucous membranes are moist.   Cardiovascular:      Rate and Rhythm: Normal rate and regular rhythm. Pulmonary:      Effort: Pulmonary effort is normal.      Breath sounds: Normal breath sounds. Musculoskeletal:         General: No tenderness or signs of injury. Legs:    Skin:     General: Skin is warm and dry. Findings: No erythema or rash. Comments: BLE skin smooth without scaling or erythema. Xerosis of skin noted. Neurological:      General: No focal deficit present. Mental Status: She is alert and oriented to person, place, and time. Cranial Nerves: No cranial nerve deficit. Coordination: Coordination normal.      Gait: Gait normal.       Assessment & Plan   Gregor Wilson was seen today for rash, results and health maintenance. Diagnoses and all orders for this visit:    BMI 45.0-49.9, adult ([facility] [address]  -     Vitamin D 25 Hydroxy; Future  -     Insulin Free & Total; Future    Pure hypercholesterolemia  -     Lipid Panel; Future    Rash and nonspecific skin eruption  -     Crisaborole 2 % OINT; Apply topically twice daily to dermatitis. Dermatitis  -     Crisaborole 2 % OINT; Apply topically twice daily to dermatitis. Vitamin D deficiency  -     Vitamin D 25 Hydroxy; Future    Chronic right hip pain  -     XR HIP RIGHT (2-3 VIEWS); Future    Osteopenia of right hip    Requested lab results from Dr. Raul Villalta office. Trial eucrisa. Xray today of R hip. Labs today. Orders Placed This Encounter   Procedures    XR HIP RIGHT (2-3 VIEWS)     Standing Status:   Future     Standing Expiration Date:   6/21/2022     Order Specific Question:   Reason for exam:     Answer:   chronic R hip pain, limits ADLs/walking, rule out OA.  Lipid Panel     Standing Status:   Future     Number of Occurrences:   1     Standing Expiration Date:   6/21/2022     Order Specific Question:   Is Patient Fasting?/# of Hours     Answer:   yes    Vitamin D 25 Hydroxy     Standing Status:   Future     Number of Occurrences:   1     Standing Expiration Date:   6/21/2022    Insulin Free & Total     Standing Status:   Future     Number of Occurrences:   1     Standing Expiration Date:   6/21/2022     Orders Placed This Encounter   Medications    Crisaborole 2 % OINT     Sig: Apply topically twice daily to dermatitis.      Dispense:  100 g     Refill:  2 Cannot send alternate options due to black box warning. Medications Discontinued During This Encounter   Medication Reason    fexofenadine (ALLEGRA ALLERGY) 180 MG tablet LIST CLEANUP    pantoprazole (PROTONIX) 40 MG tablet LIST CLEANUP     Return in about 7 months (around 1/21/2022) for follow up in office. Reviewed with the patient: current clinical status, medications, activities and diet. Side effects, adverse effects of the medication prescribed today, as well as treatment plan/ rationale and result expectations have been discussed with the patient who expresses understanding and desires to proceed. Close follow up to evaluate treatment results and for coordination of care. I have reviewed the patient's medical history in detail and updated the computerized patient record.     Milagros Huang PA-C No

## 2022-05-23 ENCOUNTER — OFFICE VISIT (OUTPATIENT)
Dept: FAMILY MEDICINE CLINIC | Age: 67
End: 2022-05-23
Payer: COMMERCIAL

## 2022-05-23 VITALS
HEIGHT: 63 IN | RESPIRATION RATE: 16 BRPM | SYSTOLIC BLOOD PRESSURE: 122 MMHG | HEART RATE: 52 BPM | TEMPERATURE: 97.5 F | OXYGEN SATURATION: 98 % | DIASTOLIC BLOOD PRESSURE: 78 MMHG | WEIGHT: 198.2 LBS | BODY MASS INDEX: 35.12 KG/M2

## 2022-05-23 DIAGNOSIS — E88.81 INSULIN RESISTANCE: Primary | ICD-10-CM

## 2022-05-23 DIAGNOSIS — M54.50 CHRONIC MIDLINE LOW BACK PAIN WITHOUT SCIATICA: ICD-10-CM

## 2022-05-23 DIAGNOSIS — N28.9 ABNORMAL RENAL FUNCTION: ICD-10-CM

## 2022-05-23 DIAGNOSIS — G89.29 CHRONIC MIDLINE LOW BACK PAIN WITHOUT SCIATICA: ICD-10-CM

## 2022-05-23 DIAGNOSIS — E88.81 INSULIN RESISTANCE: ICD-10-CM

## 2022-05-23 DIAGNOSIS — E78.00 PURE HYPERCHOLESTEROLEMIA: ICD-10-CM

## 2022-05-23 PROBLEM — R30.0 DYSURIA: Status: RESOLVED | Noted: 2021-04-26 | Resolved: 2022-05-23

## 2022-05-23 PROBLEM — L30.9 DERMATITIS: Status: RESOLVED | Noted: 2021-06-21 | Resolved: 2022-05-23

## 2022-05-23 LAB
ANION GAP SERPL CALCULATED.3IONS-SCNC: 8 MEQ/L (ref 9–15)
BUN BLDV-MCNC: 18 MG/DL (ref 8–23)
CALCIUM SERPL-MCNC: 9.5 MG/DL (ref 8.5–9.9)
CHLORIDE BLD-SCNC: 107 MEQ/L (ref 95–107)
CO2: 28 MEQ/L (ref 20–31)
CREAT SERPL-MCNC: 0.89 MG/DL (ref 0.5–0.9)
GFR AFRICAN AMERICAN: >60
GFR NON-AFRICAN AMERICAN: >60
GLUCOSE BLD-MCNC: 93 MG/DL (ref 70–99)
POTASSIUM SERPL-SCNC: 4.7 MEQ/L (ref 3.4–4.9)
SODIUM BLD-SCNC: 143 MEQ/L (ref 135–144)

## 2022-05-23 PROCEDURE — G8399 PT W/DXA RESULTS DOCUMENT: HCPCS | Performed by: PHYSICIAN ASSISTANT

## 2022-05-23 PROCEDURE — 1090F PRES/ABSN URINE INCON ASSESS: CPT | Performed by: PHYSICIAN ASSISTANT

## 2022-05-23 PROCEDURE — 1036F TOBACCO NON-USER: CPT | Performed by: PHYSICIAN ASSISTANT

## 2022-05-23 PROCEDURE — 99214 OFFICE O/P EST MOD 30 MIN: CPT | Performed by: PHYSICIAN ASSISTANT

## 2022-05-23 PROCEDURE — 1123F ACP DISCUSS/DSCN MKR DOCD: CPT | Performed by: PHYSICIAN ASSISTANT

## 2022-05-23 PROCEDURE — G8417 CALC BMI ABV UP PARAM F/U: HCPCS | Performed by: PHYSICIAN ASSISTANT

## 2022-05-23 PROCEDURE — G8427 DOCREV CUR MEDS BY ELIG CLIN: HCPCS | Performed by: PHYSICIAN ASSISTANT

## 2022-05-23 PROCEDURE — 3017F COLORECTAL CA SCREEN DOC REV: CPT | Performed by: PHYSICIAN ASSISTANT

## 2022-05-23 RX ORDER — HYDROCHLOROTHIAZIDE 25 MG/1
25 TABLET ORAL DAILY PRN
Qty: 60 TABLET | Refills: 11 | Status: SHIPPED
Start: 2022-05-23

## 2022-05-23 ASSESSMENT — ENCOUNTER SYMPTOMS
VOMITING: 0
COLOR CHANGE: 0
CONSTIPATION: 0
RECTAL PAIN: 0
WHEEZING: 0
ABDOMINAL DISTENTION: 0
DIARRHEA: 0
SHORTNESS OF BREATH: 0
NAUSEA: 0
CHEST TIGHTNESS: 0
ABDOMINAL PAIN: 0
COUGH: 0
BACK PAIN: 1

## 2022-05-23 NOTE — PROGRESS NOTES
Subjective  Nicole Rank, 73 y.o. female presents today with:  Chief Complaint   Patient presents with    Follow-up     4 mo from 1/21/2022 due to rash, Pt states no issues since she was last seen. . Pt would like to discuss her recent labs today. HPI  In the office today for follow up. Last OV with me: 1/21/22    Overall, doing very well. Needing repeat labs today for insulin and bmp. Was having some decreased renal function last OV due to mobic and metformin. Both medications held; no longer taking mobic for LBP. Restarted metformin at 250 mg bid. Has been making dietary changes/exercise for weight loss. Down over 70 lb since last year. Limited use of HCTZ--has not taken in over a month. PRN. Feeling very well. Chronic dermatitis, rash of LLE--RESOLVED. Has not needed clobetasol or eucrisa. Continues with atarax nightly for pruritis. Would like to trial off of this. Last mammogram 5/21--NORMAL. Would like every other year screening. Remains on 5 mg crestor. Remains doing well on lexparo. Review of Systems   Constitutional: Negative for activity change, appetite change, chills, diaphoresis, fatigue, fever and unexpected weight change. HENT: Negative for congestion. Respiratory: Negative for cough, chest tightness, shortness of breath and wheezing. Cardiovascular: Negative for chest pain, palpitations and leg swelling. Gastrointestinal: Negative for abdominal distention, abdominal pain, constipation, diarrhea, nausea, rectal pain and vomiting. Genitourinary: Negative for dysuria. Musculoskeletal: Positive for arthralgias, back pain and gait problem (intermittent). Negative for joint swelling and myalgias. Skin: Negative for color change, rash and wound. Neurological: Negative for dizziness, tremors, weakness, light-headedness, numbness and headaches. Psychiatric/Behavioral: Negative for agitation, dysphoric mood and sleep disturbance.  The patient is not nervous/anxious. Past Medical History:   Diagnosis Date    Acne     Asthma     Deep vein thrombosis (DVT) (HCC)     right leg    Herniated disc     L4-L5    Hip pain, left     Hyperlipidemia     Rash and nonspecific skin eruption     TMJ (temporomandibular joint disorder)      Past Surgical History:   Procedure Laterality Date    BUNIONECTOMY Right 2019    CHOLECYSTECTOMY, LAPAROSCOPIC N/A 2021    LAPAROSCOPIC CHOLECYSTECTOMY  WITH CHOLANGIOGRAMS performed by Pricilla Ledezma MD at [address] [place] [address]     Social History     Socioeconomic History    Marital status:      Spouse name: Not on file    Number of children: Not on file    Years of education: Not on file    Highest education level: Not on file   Occupational History    Not on file   Tobacco Use    Smoking status: Former Smoker     Packs/day: 0.25     Years: 20.00     Pack years: 5.00     Types: Cigarettes     Quit date:      Years since quittin.4    Smokeless tobacco: Never Used   Substance and Sexual Activity    Alcohol use: No    Drug use: No    Sexual activity: Yes   Other Topics Concern    Not on file   Social History Narrative    Not on file     Social Determinants of Health     Financial Resource Strain: Low Risk     Difficulty of Paying Living Expenses: Not hard at all   Food Insecurity: No Food Insecurity    Worried About Running Out of Food in the Last Year: Never true    Marleen of Food in the Last Year: Never true   Transportation Needs:     Lack of Transportation (Medical): Not on file    Lack of Transportation (Non-Medical):  Not on file   Physical Activity:     Days of Exercise per Week: Not on file    Minutes of Exercise per Session: Not on file   Stress:     Feeling of Stress : Not on file   Social Connections:     Frequency of Communication with Friends and Family: Not on file    Frequency of Social Gatherings with Friends and Family: Not on file    Attends Religion Services: Not on file    Active Member of Clubs or Organizations: Not on file    Attends Club or Organization Meetings: Not on file    Marital Status: Not on file   Intimate Partner Violence:     Fear of Current or Ex-Partner: Not on file    Emotionally Abused: Not on file    Physically Abused: Not on file    Sexually Abused: Not on file   Housing Stability:     Unable to Pay for Housing in the Last Year: Not on file    Number of Places Lived in the Last Year: Not on file    Unstable Housing in the Last Year: Not on file     Family History   Problem Relation Age of Onset    Heart Disease Mother     High Blood Pressure Father     Heart Disease Father      Allergies   Allergen Reactions    Augmentin [Amoxicillin-Pot Clavulanate] Diarrhea    Cinnamic Acide     Balsam Rash     equivocal patch test     Linalool Rash     Positive patch test    Methylisothiazolinone Rash     Positive patch test    Other Rash     Positive patch test, fragrance mix     Current Outpatient Medications   Medication Sig Dispense Refill    hydroCHLOROthiazide (HYDRODIURIL) 25 MG tablet Take 1 tablet by mouth daily as needed SWELLING. 60 tablet 11    hydrOXYzine (ATARAX) 50 MG tablet TAKE 1 TABLET EVERY 8 HOURS AS NEEDED FOR ITCHING 90 tablet 11    metFORMIN (GLUCOPHAGE) 500 MG tablet TAKE ONE-HALF (1/2) TABLET TWICE A DAY WITH MEALS 90 tablet 3    menthol-zinc oxide (CALMOSEPTINE) 0.44-20.6 % OINT ointment Apply topically daily Apply thin layer to rash nightly. 113 g 2    pramipexole (MIRAPEX) 0.125 MG tablet Take 1 tablet by mouth nightly 90 tablet 3    escitalopram (LEXAPRO) 20 MG tablet Take 10 mg initially for 7 days then increase to 20 mg daily by mouth. 90 tablet 4    ADVAIR DISKUS 100-50 MCG/DOSE diskus inhaler USE 1 INHALATION EVERY 12 HOURS, RINSE MOUTH OUT AFTER USE 3 each 3    Cholecalciferol (VITAMIN D) 125 MCG (5000 UT) CAPS Take 1 capsule by mouth daily for vit D def.  90 capsule 4    solifenacin (VESICARE) 10 MG tablet TAKE ONE TABLET BY MOUTH EVERY DAY 90 tablet 4    rosuvastatin (CRESTOR) 5 MG tablet Take 1 tablet by mouth daily 90 tablet 4    albuterol sulfate HFA (VENTOLIN HFA) 108 (90 Base) MCG/ACT inhaler Inhale 1 puff into the lungs every 4 hours as needed for Wheezing or Shortness of Breath 2 Inhaler 5    traMADol HCl 100 MG TABS Take 100 mg by mouth every 6 hours as needed      traZODone (DESYREL) 100 MG tablet TAKE ONE TABLET BY MOUTH NIGHTLY 30 tablet 5    valACYclovir (VALTREX) 500 MG tablet Take 1 tablet by mouth 2 times daily as needed 20 tablet 0     No current facility-administered medications for this visit. PMH, Surgical Hx, Family Hx, and Social Hx reviewed and updated. Health Maintenance reviewed. Objective  Vitals:    05/23/22 1016   BP: 122/78   Site: Left Upper Arm   Position: Sitting   Cuff Size: Large Adult   Pulse: 52   Resp: 16   Temp: 97.5 °F (36.4 °C)   TempSrc: Temporal   SpO2: 98%   Weight: 198 lb 3.2 oz (89.9 kg)   Height: 5' 3\" (1.6 m)     BP Readings from Last 3 Encounters:   05/23/22 122/78   01/21/22 120/82   06/21/21 120/80     Wt Readings from Last 3 Encounters:   05/23/22 198 lb 3.2 oz (89.9 kg)   01/21/22 211 lb (95.7 kg)   06/21/21 260 lb (117.9 kg)     Physical Exam  Vitals reviewed. Constitutional:       Appearance: Normal appearance. HENT:      Head: Normocephalic and atraumatic. Right Ear: External ear normal.      Left Ear: External ear normal.      Nose: Nose normal.      Mouth/Throat:      Mouth: Mucous membranes are moist.   Cardiovascular:      Rate and Rhythm: Normal rate and regular rhythm. Pulmonary:      Effort: Pulmonary effort is normal.   Musculoskeletal:         General: Tenderness (low back, TTP across lumbar and sacrum ) present. Skin:     General: Skin is warm and dry. Findings: No rash. Neurological:      General: No focal deficit present.       Mental Status: She is alert and oriented to person, place, and time. Psychiatric:         Mood and Affect: Mood normal.         Behavior: Behavior normal.         Thought Content: Thought content normal.         Judgment: Judgment normal.       Assessment & Plan   Manda Watson was seen today for follow-up. Diagnoses and all orders for this visit:    Insulin resistance  -     Insulin Free & Total; Future    Abnormal renal function  -     Basic Metabolic Panel; Future    Chronic midline low back pain without sciatica    BMI 35.0-35.9,adult    Pure hypercholesterolemia    Other orders  -     hydroCHLOROthiazide (HYDRODIURIL) 25 MG tablet; Take 1 tablet by mouth daily as needed SWELLING. Repeat labs today. Continues to work towards healthy lifestyle/weight loss--continue. May consider nephrology referral if BUn/creatinine remains abnormal.   6 month follow up. AWV at that time. Orders Placed This Encounter   Procedures    Insulin Free & Total     Standing Status:   Future     Standing Expiration Date:   5/23/2023    Basic Metabolic Panel     Standing Status:   Future     Standing Expiration Date:   5/23/2023     Orders Placed This Encounter   Medications    hydroCHLOROthiazide (HYDRODIURIL) 25 MG tablet     Sig: Take 1 tablet by mouth daily as needed SWELLING. Dispense:  60 tablet     Refill:  11     Medications Discontinued During This Encounter   Medication Reason    Crisaborole 2 % OINT Therapy completed    clobetasol (TEMOVATE) 0.05 % cream Therapy completed    meloxicam (MOBIC) 15 MG tablet Therapy completed    hydrochlorothiazide (HYDRODIURIL) 25 MG tablet DOSE ADJUSTMENT     Return in about 6 months (around 11/23/2022) for follow up in office. Reviewed with the patient: current clinical status, medications, activities and diet.      Side effects, adverse effects of the medication prescribed today, as well as treatment plan/ rationale and result expectations have been discussed with the patient who expresses understanding and desires to proceed. Close follow up to evaluate treatment results and for coordination of care. I have reviewed the patient's medical history in detail and updated the computerized patient record.     Milagros Huang PA-C

## 2022-05-25 LAB
INSULIN FREE: 14 UIU/ML (ref 3–25)
INSULIN: 16 UIU/ML (ref 3–25)

## 2022-06-24 ENCOUNTER — TELEPHONE (OUTPATIENT)
Dept: FAMILY MEDICINE CLINIC | Age: 67
End: 2022-06-24

## 2022-06-27 DIAGNOSIS — N32.81 OVERACTIVE BLADDER: ICD-10-CM

## 2022-06-27 RX ORDER — SOLIFENACIN SUCCINATE 10 MG/1
TABLET, FILM COATED ORAL
Qty: 90 TABLET | Refills: 4 | Status: SHIPPED | OUTPATIENT
Start: 2022-06-27

## 2022-06-27 NOTE — TELEPHONE ENCOUNTER
Rx request   Requested Prescriptions     Pending Prescriptions Disp Refills    solifenacin (VESICARE) 10 MG tablet 90 tablet 4     Sig: TAKE ONE TABLET BY MOUTH EVERY DAY     LOV 5/23/2022  Next Visit Date:  Future Appointments   Date Time Provider Dayne Cullen   11/29/2022  9:00 AM Aydin Duarte PA-C [facility] [place]

## 2022-07-19 DIAGNOSIS — E78.00 PURE HYPERCHOLESTEROLEMIA: ICD-10-CM

## 2022-07-19 NOTE — TELEPHONE ENCOUNTER
Future Appointments    Encounter Information    Provider Department Appt Notes   11/29/2022 Michael Sutton PA-C [facility] Return in about 6 months for follow up in office.        Past Visits    Date Provider Specialty Visit Type Primary Dx   05/23/2022 Michael Sutton PA-C Family Medicine Office Visit Insulin resistance   01/21/2022 Michael Sutton PA-C Family Medicine Office Visit Rash and nonspecific skin eruption   06/21/2021 Michael Sutton PA-C Family Medicine Office Visit BMI 45.0-49.9, adult [facility])

## 2022-07-20 RX ORDER — ROSUVASTATIN CALCIUM 5 MG/1
TABLET, COATED ORAL
Qty: 90 TABLET | Refills: 3 | Status: SHIPPED | OUTPATIENT
Start: 2022-07-20

## 2022-08-29 DIAGNOSIS — E55.9 VITAMIN D DEFICIENCY: ICD-10-CM

## 2022-08-31 RX ORDER — DM/P-EPHED/ACETAMINOPH/DOXYLAM
LIQUID (ML) ORAL
Qty: 90 CAPSULE | Refills: 3 | Status: SHIPPED | OUTPATIENT
Start: 2022-08-31

## 2022-08-31 NOTE — TELEPHONE ENCOUNTER
Future Appointments    Encounter Information    Provider Department Appt Notes   11/29/2022 Mitali Benito PA-C [facility] AWV Due   Return in about 6 months for follow up in office.      Past Visits    Date Provider Specialty Visit Type Primary Dx   05/23/2022 Mitali Benito PA-C Family Medicine Office Visit Insulin resistance

## 2022-11-25 ENCOUNTER — OFFICE VISIT (OUTPATIENT)
Dept: FAMILY MEDICINE CLINIC | Age: 67
End: 2022-11-25
Payer: COMMERCIAL

## 2022-11-25 VITALS
OXYGEN SATURATION: 96 % | DIASTOLIC BLOOD PRESSURE: 66 MMHG | BODY MASS INDEX: 38.38 KG/M2 | HEIGHT: 63 IN | WEIGHT: 216.6 LBS | SYSTOLIC BLOOD PRESSURE: 118 MMHG | HEART RATE: 65 BPM | TEMPERATURE: 97.6 F

## 2022-11-25 DIAGNOSIS — S89.91XA INJURY OF RIGHT KNEE, INITIAL ENCOUNTER: Primary | ICD-10-CM

## 2022-11-25 PROCEDURE — 1036F TOBACCO NON-USER: CPT

## 2022-11-25 PROCEDURE — 3017F COLORECTAL CA SCREEN DOC REV: CPT

## 2022-11-25 PROCEDURE — 99213 OFFICE O/P EST LOW 20 MIN: CPT

## 2022-11-25 PROCEDURE — G8427 DOCREV CUR MEDS BY ELIG CLIN: HCPCS

## 2022-11-25 PROCEDURE — G8399 PT W/DXA RESULTS DOCUMENT: HCPCS

## 2022-11-25 PROCEDURE — 1123F ACP DISCUSS/DSCN MKR DOCD: CPT

## 2022-11-25 PROCEDURE — G8417 CALC BMI ABV UP PARAM F/U: HCPCS

## 2022-11-25 PROCEDURE — G8484 FLU IMMUNIZE NO ADMIN: HCPCS

## 2022-11-25 PROCEDURE — 1090F PRES/ABSN URINE INCON ASSESS: CPT

## 2022-11-25 RX ORDER — PREDNISONE 50 MG/1
50 TABLET ORAL DAILY
Qty: 5 TABLET | Refills: 0 | Status: SHIPPED | OUTPATIENT
Start: 2022-11-25 | End: 2022-11-30

## 2022-11-25 RX ORDER — VALACYCLOVIR HYDROCHLORIDE 500 MG/1
500 TABLET, FILM COATED ORAL 2 TIMES DAILY
Qty: 20 TABLET | Refills: 0 | Status: SHIPPED | OUTPATIENT
Start: 2022-11-25 | End: 2022-12-05

## 2022-11-25 SDOH — ECONOMIC STABILITY: FOOD INSECURITY: WITHIN THE PAST 12 MONTHS, THE FOOD YOU BOUGHT JUST DIDN'T LAST AND YOU DIDN'T HAVE MONEY TO GET MORE.: NEVER TRUE

## 2022-11-25 SDOH — ECONOMIC STABILITY: FOOD INSECURITY: WITHIN THE PAST 12 MONTHS, YOU WORRIED THAT YOUR FOOD WOULD RUN OUT BEFORE YOU GOT MONEY TO BUY MORE.: NEVER TRUE

## 2022-11-25 ASSESSMENT — SOCIAL DETERMINANTS OF HEALTH (SDOH): HOW HARD IS IT FOR YOU TO PAY FOR THE VERY BASICS LIKE FOOD, HOUSING, MEDICAL CARE, AND HEATING?: NOT HARD AT ALL

## 2022-11-25 NOTE — LETTER
[address]  Phone: [phone]  Fax: [phone]    VANESA Harden CNP        November 25, 2022     Patient: Amando Baig   YOB: 1955   Date of Visit: 11/25/2022       To Whom It May Concern: It is my medical opinion that James Mims may return to work on 11/30/2022. If you have any questions or concerns, please don't hesitate to call.     Sincerely,        VANESA Harden - CNP

## 2022-11-25 NOTE — PATIENT INSTRUCTIONS
Ibuprofen with food, rest and ice for 20 min 3 time daily for the next 4-5 ays if symptoms not improving may need to follow up with Orthopedic.

## 2022-11-28 ASSESSMENT — ENCOUNTER SYMPTOMS
COLOR CHANGE: 0
RESPIRATORY NEGATIVE: 1
BACK PAIN: 0

## 2022-11-28 NOTE — PROGRESS NOTES
[facility] [address] Encounter        ASSESSMENT/PLAN     Clau Ashley is a 78 y.o. female who presents with:  Right knee pain starting on Tuesday that is moderate and constant. Worse when walking. Denies trauma to the knee reports overuse prior to onset of pain during dance class. Also reporting blisters on mouth consistent with cold sores that she has history of, requesting antiviral medication. She has been on Valtrex for this previously. On examination there is no swelling of the knee no bruising or abrasions. Patient reports pain medially. 1. Injury of right knee, initial encounter      Orders for prednisone for inflammation. Advised patient use ice 2-3 times daily for 20 minutes. Ibuprofen with food for pain and inflammation. Advised if symptoms not improving after 4 to 5 days she should follow-up with an orthopedic I can place referral if needed. PATIENT REFERRED TO:  Return if symptoms worsen or fail to improve. DISCHARGE MEDICATIONS:  New Prescriptions    PREDNISONE (DELTASONE) 50 MG TABLET    Take 1 tablet by mouth daily for 5 days     Cannot display discharge medications since this is not an admission. Ning Orellana, APRN - CNP    CHIEF COMPLAINT       Chief Complaint   Patient presents with    Knee Pain     Right knee pain since Tuesday stabbing/achy pain pt states more painful when walking/ standing up, and when sitting still feels pain but not as bad as when using it pt takes tylenol to relieve sx's    Mouth Lesions     X2 weeks painful cold sore on top and bottom lips          SUBJECTIVE/REVIEW OF SYSTEMS     Review of Systems   Constitutional:  Negative for chills, fatigue and fever. Respiratory: Negative. Cardiovascular: Negative. Genitourinary: Negative. Musculoskeletal:  Positive for arthralgias. Negative for back pain, gait problem, joint swelling and myalgias. Skin:  Negative for color change and wound.    Neurological: Negative for weakness and numbness. Hematological:  Negative for adenopathy. Does not bruise/bleed easily. Psychiatric/Behavioral:  Negative for agitation. OBJECTIVE/PHYSICAL EXAM     Physical Exam  Vitals reviewed. Constitutional:       General: She is not in acute distress. Appearance: Normal appearance. Cardiovascular:      Rate and Rhythm: Normal rate. Pulses: Normal pulses. Pulmonary:      Effort: Pulmonary effort is normal. No respiratory distress. Musculoskeletal:         General: Tenderness present. No swelling, deformity or signs of injury. Normal range of motion. Legs:    Skin:     General: Skin is warm and dry. Capillary Refill: Capillary refill takes less than 2 seconds. Findings: No bruising, erythema, lesion or rash. Neurological:      Mental Status: She is alert and oriented to person, place, and time. Mental status is at baseline. Sensory: No sensory deficit. Motor: No weakness. Gait: Gait normal.   Psychiatric:         Mood and Affect: Mood normal.       VITALS  BP: 118/66, Temp: 97.6 °F (36.4 °C), Temp Source: Temporal, Heart Rate: 65,  , SpO2: 96 %      PAST MEDICAL HISTORY         Diagnosis Date    Acne     Asthma     Deep vein thrombosis (DVT) (HCC)     right leg    Herniated disc     L4-L5    Hip pain, left     Hyperlipidemia     Rash and nonspecific skin eruption     TMJ (temporomandibular joint disorder)      SURGICAL HISTORY     Patient  has a past surgical history that includes Hysterectomy (2000); Thyroid surgery (1968); Bunionectomy (Right, 09/2019); and Cholecystectomy, laparoscopic (N/A, 4/20/2021).   CURRENT MEDICATIONS       Previous Medications    ADVAIR DISKUS 100-50 MCG/DOSE DISKUS INHALER    USE 1 INHALATION EVERY 12 HOURS, RINSE MOUTH OUT AFTER USE    ALBUTEROL SULFATE HFA (VENTOLIN HFA) 108 (90 BASE) MCG/ACT INHALER    Inhale 1 puff into the lungs every 4 hours as needed for Wheezing or Shortness of Breath ESCITALOPRAM (LEXAPRO) 20 MG TABLET    Take 10 mg initially for 7 days then increase to 20 mg daily by mouth. HYDROCHLOROTHIAZIDE (HYDRODIURIL) 25 MG TABLET    Take 1 tablet by mouth daily as needed SWELLING. HYDROXYZINE (ATARAX) 50 MG TABLET    TAKE 1 TABLET EVERY 8 HOURS AS NEEDED FOR ITCHING    MENTHOL-ZINC OXIDE (CALMOSEPTINE) 0.44-20.6 % OINT OINTMENT    Apply topically daily Apply thin layer to rash nightly. METFORMIN (GLUCOPHAGE) 500 MG TABLET    TAKE ONE-HALF (1/2) TABLET TWICE A DAY WITH MEALS    PRAMIPEXOLE (MIRAPEX) 0.125 MG TABLET    Take 1 tablet by mouth nightly    ROSUVASTATIN (CRESTOR) 5 MG TABLET    TAKE 1 TABLET DAILY    SOLIFENACIN (VESICARE) 10 MG TABLET    TAKE ONE TABLET BY MOUTH EVERY DAY    TRAMADOL  MG TABS    Take 100 mg by mouth every 6 hours as needed    TRAZODONE (DESYREL) 100 MG TABLET    TAKE ONE TABLET BY MOUTH NIGHTLY    VITAMIN D (D 5000) 125 MCG (5000 UT) CAPS CAPSULE    TAKE 1 CAPSULE DAILY FOR VITAMIN D DEFICIENCY     ALLERGIES     Patient is is allergic to augmentin [amoxicillin-pot clavulanate], cinnamic acide, balsam, linalool, methylisothiazolinone, and other. FAMILY HISTORY     Patient'sfamily history includes Heart Disease in her father and mother; High Blood Pressure in her father. HISTORY     Patient  reports that she quit smoking about 24 years ago. Her smoking use included cigarettes. She has a 5.00 pack-year smoking history. She has never used smokeless tobacco. She reports that she does not drink alcohol and does not use drugs. READY CARE COURSE   No orders of the defined types were placed in this encounter. Labs:  No results found for this visit on 11/25/22. IMAGING:  No orders to display     Scheduled Meds:  Continuous Infusions:  PRN Meds:.

## 2022-11-29 ENCOUNTER — OFFICE VISIT (OUTPATIENT)
Dept: FAMILY MEDICINE CLINIC | Age: 67
End: 2022-11-29
Payer: COMMERCIAL

## 2022-11-29 ENCOUNTER — HOSPITAL ENCOUNTER (OUTPATIENT)
Dept: GENERAL RADIOLOGY | Age: 67
Discharge: HOME | End: 2022-12-01
Payer: COMMERCIAL

## 2022-11-29 VITALS
BODY MASS INDEX: 40.1 KG/M2 | TEMPERATURE: 97.2 F | SYSTOLIC BLOOD PRESSURE: 126 MMHG | DIASTOLIC BLOOD PRESSURE: 72 MMHG | WEIGHT: 226.3 LBS | HEIGHT: 63 IN | OXYGEN SATURATION: 95 % | HEART RATE: 65 BPM

## 2022-11-29 DIAGNOSIS — S89.91XA KNEE INJURIES, RIGHT, INITIAL ENCOUNTER: ICD-10-CM

## 2022-11-29 DIAGNOSIS — S89.91XA KNEE INJURIES, RIGHT, INITIAL ENCOUNTER: Primary | ICD-10-CM

## 2022-11-29 PROCEDURE — 1090F PRES/ABSN URINE INCON ASSESS: CPT | Performed by: NURSE PRACTITIONER

## 2022-11-29 PROCEDURE — 73560 X-RAY EXAM OF KNEE 1 OR 2: CPT

## 2022-11-29 PROCEDURE — 1123F ACP DISCUSS/DSCN MKR DOCD: CPT | Performed by: NURSE PRACTITIONER

## 2022-11-29 PROCEDURE — 99213 OFFICE O/P EST LOW 20 MIN: CPT | Performed by: NURSE PRACTITIONER

## 2022-11-29 PROCEDURE — 1036F TOBACCO NON-USER: CPT | Performed by: NURSE PRACTITIONER

## 2022-11-29 PROCEDURE — 96372 THER/PROPH/DIAG INJ SC/IM: CPT | Performed by: NURSE PRACTITIONER

## 2022-11-29 PROCEDURE — G8417 CALC BMI ABV UP PARAM F/U: HCPCS | Performed by: NURSE PRACTITIONER

## 2022-11-29 PROCEDURE — 3017F COLORECTAL CA SCREEN DOC REV: CPT | Performed by: NURSE PRACTITIONER

## 2022-11-29 PROCEDURE — G8427 DOCREV CUR MEDS BY ELIG CLIN: HCPCS | Performed by: NURSE PRACTITIONER

## 2022-11-29 PROCEDURE — G8484 FLU IMMUNIZE NO ADMIN: HCPCS | Performed by: NURSE PRACTITIONER

## 2022-11-29 PROCEDURE — G8399 PT W/DXA RESULTS DOCUMENT: HCPCS | Performed by: NURSE PRACTITIONER

## 2022-11-29 RX ORDER — IBUPROFEN 800 MG/1
800 TABLET ORAL 2 TIMES DAILY PRN
Qty: 60 TABLET | Refills: 0 | Status: SHIPPED | OUTPATIENT
Start: 2022-11-29

## 2022-11-29 RX ORDER — KETOROLAC TROMETHAMINE 30 MG/ML
60 INJECTION, SOLUTION INTRAMUSCULAR; INTRAVENOUS ONCE
Status: COMPLETED | OUTPATIENT
Start: 2022-11-29 | End: 2022-11-29

## 2022-11-29 RX ADMIN — KETOROLAC TROMETHAMINE 60 MG: 30 INJECTION, SOLUTION INTRAMUSCULAR; INTRAVENOUS at 09:56

## 2022-11-29 NOTE — PROGRESS NOTES
After obtaining consent, and per orders of Yanna Terry, injection of ketorolac was given in Left upper quad. gluteus by Joyce Gleason MA. Patient instructed to remain in clinic for 20 minutes afterwards, and to report any adverse reaction to me immediately.  Z track tolerated well

## 2022-11-29 NOTE — PROGRESS NOTES
Subjective:      Patient ID: Adria Edwards is a 78 y.o. female who presents today for:  Chief Complaint   Patient presents with    Leg Injury     Exercise class , hurt her  lt knee , she is on prednisone 50mg 1 every 5 day, its working but she still having pain, start 1 week        HPI SUBJECTIVE:  Adria Edwards is a 78 y.o. female who sustained a left knee injury 7 day(s) ago. Mechanism of injury: exercise class. Immediate symptoms: immediate pain, immediate swelling, inability to bear weight directly after injury. Symptoms have been intermittent since that time. Prior history of related problems: no prior problems with this area in the past. She was seen in [facility] care on 11/25/2022 and prescribed prednisone. She reports a decrease in swelling but no relief of pain. OBJECTIVE:  Vital signs as noted above. Appearance: alert, well appearing, and in no distress. Knee exam: reduced range of motion. X-ray: pending review by radiologist.    ASSESSMENT:  Knee sprain    PLAN:  rest the injured area as much as practical, apply ice packs, splint dispensed and applied, X-Ray ordered, referral to Orthopedics for this injury, prescription for analgesic given, prescription for muscle relaxant, prescription for NSAID given  See orders for this visit as documented in the electronic medical record.      Past Medical History:   Diagnosis Date    Acne     Asthma     Deep vein thrombosis (DVT) (HCC)     right leg    Herniated disc     L4-L5    Hip pain, left     Hyperlipidemia     Rash and nonspecific skin eruption     TMJ (temporomandibular joint disorder)      Past Surgical History:   Procedure Laterality Date    BUNIONECTOMY Right 09/2019    CHOLECYSTECTOMY, LAPAROSCOPIC N/A 4/20/2021    LAPAROSCOPIC CHOLECYSTECTOMY  WITH CHOLANGIOGRAMS performed by John Cevallos MD at [address] [place] [address] ([address] [place] [address])  2000    THYROID SURGERY  1968     Social History     Socioeconomic History    Marital status:  Spouse name: Not on file    Number of children: Not on file    Years of education: Not on file    Highest education level: Not on file   Occupational History    Not on file   Tobacco Use    Smoking status: Former     Packs/day: 0.25     Years: 20.00     Pack years: 5.00     Types: Cigarettes     Quit date: [place]     Years since quittin.9    Smokeless tobacco: Never   Substance and Sexual Activity    Alcohol use: No    Drug use: No    Sexual activity: Yes   Other Topics Concern    Not on file   Social History Narrative    Not on file     Social Determinants of Health     Financial Resource Strain: Low Risk     Difficulty of Paying Living Expenses: Not hard at all   Food Insecurity: No Food Insecurity    Worried About Running Out of Food in the Last Year: Never true    Ran Out of Food in the Last Year: Never true   Transportation Needs: Not on file   Physical Activity: Not on file   Stress: Not on file   Social Connections: Not on file   Intimate Partner Violence: Not on file   Housing Stability: Not on file     Family History   Problem Relation Age of Onset    Heart Disease Mother     High Blood Pressure Father     Heart Disease Father      Allergies   Allergen Reactions    Augmentin [Amoxicillin-Pot Clavulanate] Diarrhea    Cinnamic Acide     Balsam Rash     equivocal patch test     Linalool Rash     Positive patch test    Methylisothiazolinone Rash     Positive patch test    Other Rash     Positive patch test, fragrance mix     Current Outpatient Medications   Medication Sig Dispense Refill    ibuprofen (ADVIL;MOTRIN) 800 MG tablet Take 1 tablet by mouth 2 times daily as needed for Pain 60 tablet 0    predniSONE (DELTASONE) 50 MG tablet Take 1 tablet by mouth daily for 5 days 5 tablet 0    valACYclovir (VALTREX) 500 MG tablet Take 1 tablet by mouth 2 times daily for 10 days 20 tablet 0    vitamin D (D 5000) 125 MCG (5000 UT) CAPS capsule TAKE 1 CAPSULE DAILY FOR VITAMIN D DEFICIENCY 90 capsule 3    rosuvastatin (CRESTOR) 5 MG tablet TAKE 1 TABLET DAILY 90 tablet 3    solifenacin (VESICARE) 10 MG tablet TAKE ONE TABLET BY MOUTH EVERY DAY 90 tablet 4    hydroCHLOROthiazide (HYDRODIURIL) 25 MG tablet Take 1 tablet by mouth daily as needed SWELLING. 60 tablet 11    hydrOXYzine (ATARAX) 50 MG tablet TAKE 1 TABLET EVERY 8 HOURS AS NEEDED FOR ITCHING 90 tablet 11    metFORMIN (GLUCOPHAGE) 500 MG tablet TAKE ONE-HALF (1/2) TABLET TWICE A DAY WITH MEALS 90 tablet 3    menthol-zinc oxide (CALMOSEPTINE) 0.44-20.6 % OINT ointment Apply topically daily Apply thin layer to rash nightly. 113 g 2    pramipexole (MIRAPEX) 0.125 MG tablet Take 1 tablet by mouth nightly 90 tablet 3    escitalopram (LEXAPRO) 20 MG tablet Take 10 mg initially for 7 days then increase to 20 mg daily by mouth. 90 tablet 4    ADVAIR DISKUS 100-50 MCG/DOSE diskus inhaler USE 1 INHALATION EVERY 12 HOURS, RINSE MOUTH OUT AFTER USE 3 each 3    albuterol sulfate HFA (VENTOLIN HFA) 108 (90 Base) MCG/ACT inhaler Inhale 1 puff into the lungs every 4 hours as needed for Wheezing or Shortness of Breath 2 Inhaler 5    traMADol HCl 100 MG TABS Take 100 mg by mouth every 6 hours as needed      traZODone (DESYREL) 100 MG tablet TAKE ONE TABLET BY MOUTH NIGHTLY 30 tablet 5     No current facility-administered medications for this visit. Objective:   /72   Pulse 65   Temp 97.2 °F (36.2 °C)   Ht 5' 3\" (1.6 m)   Wt 226 lb 4.8 oz (102.6 kg)   SpO2 95%   BMI 40.09 kg/m²         Assessment:       Diagnosis Orders   1. Knee injuries, right, initial encounter  XR KNEE RIGHT (3 VIEWS)    [facility] ORTH[facility]EDIC SUPPLIES Knee Immobilizer, Right; 12\"        No results found for this visit on 11/29/22. Plan:     Assessment & Plan   Bam Bush was seen today for leg injury. Diagnoses and all orders for this visit:    Knee injuries, right, initial encounter  -     XR KNEE RIGHT (3 VIEWS);  Future  -     [facility], [place]  -     [facility]PL[facility] Knee Immobilizer, Right; 12\"    Other orders  -     ibuprofen (ADVIL;MOTRIN) 800 MG tablet; Take 1 tablet by mouth 2 times daily as needed for Pain  -     ketorolac (TORADOL) injection 60 mg    Orders Placed This Encounter   Procedures    XR KNEE RIGHT (3 VIEWS)     Standing Status:   Future     Number of Occurrences:   1     Standing Expiration Date:   11/29/2023     Order Specific Question:   Reason for exam:     Answer:   pain and swelling    [address] [facility], [place]     Referral Priority:   Routine     Referral Type:   Eval and Treat     Referral Reason:   Specialty Services Required     Requested Specialty:   Orthopedic Surgery     Number of Visits Requested:   1    [facility] Knee Immobilizer, Right; 12\"     Order Specific Question:   Equipment:     Answer:   Knee Immobilizer, Right     Order Specific Question:   Length     Answer:   12\"     Orders Placed This Encounter   Medications    ibuprofen (ADVIL;MOTRIN) 800 MG tablet     Sig: Take 1 tablet by mouth 2 times daily as needed for Pain     Dispense:  60 tablet     Refill:  0    ketorolac (TORADOL) injection 60 mg     There are no discontinued medications. Return for follow up with PCP. Reviewed with the patient/family: current clinical status & medications. Side effects of the medication prescribed today, as well as treatment plan/rationale and result expectations have been discussed with the patient/family who expresses understanding. Patient will be discharged home in stable condition. Follow up with PCP to evaluate treatment results or return if symptoms worsen or fail to improve. Discussed signs and symptoms which require immediate follow-up in ED/call to 911. Understanding verbalized. I have reviewed the patient's medical history in detail and updated the computerized patient record.     Rock Canas VANESA Arthur - CNP

## 2022-11-29 NOTE — LETTER
[address] [place] [address]  Phone: [phone]  Fax: [phone]    VANESA Scott CNP        November 29, 2022     Patient: Mendel Friedlander   YOB: 1955   Date of Visit: 11/29/2022       To Whom it May Concern:    Emeli Hollingsworth was seen in my clinic on 11/29/2022. She may return to work on until evaluated by orthopedic surgury. .    If you have any questions or concerns, please don't hesitate to call.     Sincerely,         VANESA Scott - CNP

## 2022-12-06 NOTE — TELEPHONE ENCOUNTER
Comments: pt calling for refill mediction takes the sore away in mouth then when stopped taking it,it comes back. Last Office Visit (last PCP visit):   5/23/2022    Next Visit Date:  Future Appointments   Date Time Provider Dayne Cullen   1/30/2023 11:15 AM ANGELA Villafana [facility]       **If hasn't been seen in over a year OR hasn't followed up according to last diabetes/ADHD visit, make appointment for patient before sending refill to provider.     Rx requested:  Requested Prescriptions     Pending Prescriptions Disp Refills    valACYclovir (VALTREX) 500 MG tablet 20 tablet 0     Sig: Take 1 tablet by mouth 2 times daily for 10 days

## 2022-12-08 NOTE — TELEPHONE ENCOUNTER
Patient is requesting medication refill.  Please approve or deny this request.    Rx requested:  Requested Prescriptions     Pending Prescriptions Disp Refills    valACYclovir (VALTREX) 500 MG tablet [Pharmacy Med Name: valACYclovir HCl Oral Tablet 500 MG] 20 tablet 0     Sig: TAKE ONE TABLET BY MOUTH TWICE A DAY FOR 10 DAYS         Last Office Visit:   11/25/2022      Next Visit Date:  Future Appointments   Date Time Provider Dayne Cullen   1/30/2023 11:15 AM ANGELA Garcia [facility]

## 2022-12-09 RX ORDER — VALACYCLOVIR HYDROCHLORIDE 500 MG/1
500 TABLET, FILM COATED ORAL 2 TIMES DAILY
Qty: 20 TABLET | Refills: 0 | OUTPATIENT
Start: 2022-12-09 | End: 2022-12-19

## 2022-12-09 RX ORDER — VALACYCLOVIR HYDROCHLORIDE 500 MG/1
TABLET, FILM COATED ORAL
Qty: 20 TABLET | Refills: 0 | Status: SHIPPED | OUTPATIENT
Start: 2022-12-09 | End: 2023-01-30 | Stop reason: SDUPTHER

## 2023-01-30 ENCOUNTER — OFFICE VISIT (OUTPATIENT)
Dept: FAMILY MEDICINE CLINIC | Age: 68
End: 2023-01-30
Payer: COMMERCIAL

## 2023-01-30 VITALS
WEIGHT: 214 LBS | SYSTOLIC BLOOD PRESSURE: 128 MMHG | BODY MASS INDEX: 37.92 KG/M2 | HEIGHT: 63 IN | DIASTOLIC BLOOD PRESSURE: 78 MMHG | HEART RATE: 102 BPM | OXYGEN SATURATION: 95 % | RESPIRATION RATE: 16 BRPM

## 2023-01-30 DIAGNOSIS — K12.0 APHTHOUS ULCER: ICD-10-CM

## 2023-01-30 DIAGNOSIS — E11.9 TYPE 2 DIABETES MELLITUS WITHOUT COMPLICATION, WITHOUT LONG-TERM CURRENT USE OF INSULIN (HCC): ICD-10-CM

## 2023-01-30 DIAGNOSIS — B00.1 HERPES LABIALIS: ICD-10-CM

## 2023-01-30 DIAGNOSIS — E66.01 SEVERE OBESITY (BMI 35.0-39.9) WITH COMORBIDITY (HCC): ICD-10-CM

## 2023-01-30 DIAGNOSIS — R93.89 ABNORMAL CXR: Primary | ICD-10-CM

## 2023-01-30 DIAGNOSIS — F32.A ANXIETY AND DEPRESSION: ICD-10-CM

## 2023-01-30 DIAGNOSIS — J98.4 LUNG CALCIFICATION: ICD-10-CM

## 2023-01-30 DIAGNOSIS — E55.9 VITAMIN D DEFICIENCY: ICD-10-CM

## 2023-01-30 DIAGNOSIS — G89.29 CHRONIC RIGHT HIP PAIN: ICD-10-CM

## 2023-01-30 DIAGNOSIS — E78.00 PURE HYPERCHOLESTEROLEMIA: ICD-10-CM

## 2023-01-30 DIAGNOSIS — M25.551 CHRONIC RIGHT HIP PAIN: ICD-10-CM

## 2023-01-30 DIAGNOSIS — R10.11 RUQ PAIN: ICD-10-CM

## 2023-01-30 DIAGNOSIS — Z00.00 INITIAL MEDICARE ANNUAL WELLNESS VISIT: Primary | ICD-10-CM

## 2023-01-30 DIAGNOSIS — F41.9 ANXIETY AND DEPRESSION: ICD-10-CM

## 2023-01-30 DIAGNOSIS — M54.50 CHRONIC MIDLINE LOW BACK PAIN WITHOUT SCIATICA: ICD-10-CM

## 2023-01-30 DIAGNOSIS — G89.29 CHRONIC MIDLINE LOW BACK PAIN WITHOUT SCIATICA: ICD-10-CM

## 2023-01-30 PROCEDURE — 99214 OFFICE O/P EST MOD 30 MIN: CPT | Performed by: PHYSICIAN ASSISTANT

## 2023-01-30 PROCEDURE — 1036F TOBACCO NON-USER: CPT | Performed by: PHYSICIAN ASSISTANT

## 2023-01-30 PROCEDURE — G0438 PPPS, INITIAL VISIT: HCPCS | Performed by: PHYSICIAN ASSISTANT

## 2023-01-30 PROCEDURE — 1090F PRES/ABSN URINE INCON ASSESS: CPT | Performed by: PHYSICIAN ASSISTANT

## 2023-01-30 PROCEDURE — 1123F ACP DISCUSS/DSCN MKR DOCD: CPT | Performed by: PHYSICIAN ASSISTANT

## 2023-01-30 PROCEDURE — G8417 CALC BMI ABV UP PARAM F/U: HCPCS | Performed by: PHYSICIAN ASSISTANT

## 2023-01-30 PROCEDURE — 3017F COLORECTAL CA SCREEN DOC REV: CPT | Performed by: PHYSICIAN ASSISTANT

## 2023-01-30 PROCEDURE — 3046F HEMOGLOBIN A1C LEVEL >9.0%: CPT | Performed by: PHYSICIAN ASSISTANT

## 2023-01-30 PROCEDURE — G8484 FLU IMMUNIZE NO ADMIN: HCPCS | Performed by: PHYSICIAN ASSISTANT

## 2023-01-30 PROCEDURE — 2022F DILAT RTA XM EVC RTNOPTHY: CPT | Performed by: PHYSICIAN ASSISTANT

## 2023-01-30 PROCEDURE — G8399 PT W/DXA RESULTS DOCUMENT: HCPCS | Performed by: PHYSICIAN ASSISTANT

## 2023-01-30 PROCEDURE — G8427 DOCREV CUR MEDS BY ELIG CLIN: HCPCS | Performed by: PHYSICIAN ASSISTANT

## 2023-01-30 RX ORDER — TRAMADOL HYDROCHLORIDE 100 MG/1
100 TABLET, COATED ORAL EVERY 6 HOURS PRN
Qty: 28 TABLET | Refills: 0 | Status: SHIPPED | OUTPATIENT
Start: 2023-01-30 | End: 2023-02-06

## 2023-01-30 RX ORDER — PANTOPRAZOLE SODIUM 40 MG/1
40 TABLET, DELAYED RELEASE ORAL
Qty: 90 TABLET | Refills: 1 | Status: SHIPPED | OUTPATIENT
Start: 2023-01-30

## 2023-01-30 RX ORDER — PENCICLOVIR 10 MG/G
CREAM TOPICAL
Qty: 1.5 G | Refills: 1 | Status: SHIPPED | OUTPATIENT
Start: 2023-01-30 | End: 2023-02-03

## 2023-01-30 RX ORDER — VALACYCLOVIR HYDROCHLORIDE 500 MG/1
TABLET, FILM COATED ORAL
Qty: 60 TABLET | Refills: 1 | Status: SHIPPED | OUTPATIENT
Start: 2023-01-30

## 2023-01-30 RX ORDER — TRIAMCINOLONE ACETONIDE 0.1 %
PASTE (GRAM) DENTAL
Qty: 5 G | Refills: 4 | Status: SHIPPED | OUTPATIENT
Start: 2023-01-30 | End: 2023-02-06

## 2023-01-30 RX ORDER — TRAZODONE HYDROCHLORIDE 100 MG/1
TABLET ORAL
Qty: 30 TABLET | Refills: 5 | Status: SHIPPED | OUTPATIENT
Start: 2023-01-30

## 2023-01-30 ASSESSMENT — ENCOUNTER SYMPTOMS
SHORTNESS OF BREATH: 0
ABDOMINAL PAIN: 1
COLOR CHANGE: 0
BACK PAIN: 1
CHEST TIGHTNESS: 0
DIARRHEA: 0
NAUSEA: 0
COUGH: 0
VOMITING: 0
ABDOMINAL DISTENTION: 0
RECTAL PAIN: 0
CONSTIPATION: 0
WHEEZING: 0

## 2023-01-30 ASSESSMENT — PATIENT HEALTH QUESTIONNAIRE - PHQ9
1. LITTLE INTEREST OR PLEASURE IN DOING THINGS: 0
2. FEELING DOWN, DEPRESSED OR HOPELESS: 0
SUM OF ALL RESPONSES TO PHQ QUESTIONS 1-9: 0
3. TROUBLE FALLING OR STAYING ASLEEP: 0
SUM OF ALL RESPONSES TO PHQ QUESTIONS 1-9: 0
4. FEELING TIRED OR HAVING LITTLE ENERGY: 0
10. IF YOU CHECKED OFF ANY PROBLEMS, HOW DIFFICULT HAVE THESE PROBLEMS MADE IT FOR YOU TO DO YOUR WORK, TAKE CARE OF THINGS AT HOME, OR GET ALONG WITH OTHER PEOPLE: 0
9. THOUGHTS THAT YOU WOULD BE BETTER OFF DEAD, OR OF HURTING YOURSELF: 0
8. MOVING OR SPEAKING SO SLOWLY THAT OTHER PEOPLE COULD HAVE NOTICED. OR THE OPPOSITE, BEING SO FIGETY OR RESTLESS THAT YOU HAVE BEEN MOVING AROUND A LOT MORE THAN USUAL: 0
SUM OF ALL RESPONSES TO PHQ QUESTIONS 1-9: 0
SUM OF ALL RESPONSES TO PHQ QUESTIONS 1-9: 0
5. POOR APPETITE OR OVEREATING: 0
7. TROUBLE CONCENTRATING ON THINGS, SUCH AS READING THE NEWSPAPER OR WATCHING TELEVISION: 0
SUM OF ALL RESPONSES TO PHQ9 QUESTIONS 1 & 2: 0
6. FEELING BAD ABOUT YOURSELF - OR THAT YOU ARE A FAILURE OR HAVE LET YOURSELF OR YOUR FAMILY DOWN: 0

## 2023-01-30 ASSESSMENT — LIFESTYLE VARIABLES
HOW MANY STANDARD DRINKS CONTAINING ALCOHOL DO YOU HAVE ON A TYPICAL DAY: PATIENT DOES NOT DRINK
HOW OFTEN DO YOU HAVE A DRINK CONTAINING ALCOHOL: NEVER

## 2023-01-30 NOTE — PROGRESS NOTES
Subjective  Omayra Jorgensen, 78 y.o. female presents today with:  Chief Complaint   Patient presents with    Follow-up     Discuss staying on protonix      Chest Pain     Pain in chest feels like she swallowed something sharp, states she has has testing done for this before, has been ongoing x10 years, states she was given protonix for this which helps slightly     Other     Needs a refill on denivar, not in chart, also would like the valtrex in creased for more days, as well as triamcinolone dental paste for her canker sores      HPI  In the office today for follow up. Last OV with me: 5/23/22. Needing refills today of her PRN medications. History of herpes labialis and canker sores. Most recently had some dental work--which has led to several outbreaks. Asking for a refill of valtrex, denavir cream, kenalog paste. Medications are very effective. Has a history of chronic back and joint pain. Has taken tramadol in the past for acute episodes. Asking for refill today. Past Rx lasted for several years. Remains on 500 mg metformin for blood sugars/insulin resistance. Doing well on medication. Did have an episodes of renal insufficiency--which corrected after she stopped mobic. History of paraesophageal calcification. Known history for many years. Previously discussed with Dr. Brian Fuentes when she was having reflux-type symptoms. He prescribed protonix which she took for a period of time. Stopped medication about 2 years ago. Most recently started having shart epigastric pain, midsternum. Similar to past reflux. Started taking an old Rx of protonix--which has started to help. She has not had repeat imaging of her chest to monitor any change/growth of calcification. Remains on chronic medications, doing well. Has been working out 4-5 times/week. Eating well. Remains active with her grandchildren.      Review of Systems   Constitutional:  Negative for activity change, appetite change, chills, diaphoresis, fatigue, fever and unexpected weight change. HENT:  Negative for congestion. Respiratory:  Negative for cough, chest tightness, shortness of breath and wheezing. Cardiovascular:  Negative for chest pain, palpitations and leg swelling. Gastrointestinal:  Positive for abdominal pain. Negative for abdominal distention, constipation, diarrhea, nausea, rectal pain and vomiting. Genitourinary:  Negative for dysuria. Musculoskeletal:  Positive for arthralgias, back pain and gait problem (intermittent). Negative for joint swelling and myalgias. Skin:  Negative for color change, rash and wound. Neurological:  Negative for dizziness, tremors, weakness, light-headedness, numbness and headaches. Psychiatric/Behavioral:  Negative for agitation, dysphoric mood and sleep disturbance. The patient is not nervous/anxious. Past Medical History:   Diagnosis Date    Acne     Asthma     Deep vein thrombosis (DVT) (HCC)     right leg    Herniated disc     L4-L5    Hip pain, left     Hyperlipidemia     Rash and nonspecific skin eruption     TMJ (temporomandibular joint disorder)      Past Surgical History:   Procedure Laterality Date    BUNIONECTOMY Right 2019    CHOLECYSTECTOMY, LAPAROSCOPIC N/A 2021    LAPAROSCOPIC CHOLECYSTECTOMY  WITH CHOLANGIOGRAMS performed by Norma Akers MD at [facility] ([address] [place] [address])      THYROID SURGERY  1968     Social History     Socioeconomic History    Marital status:      Spouse name: Not on file    Number of children: Not on file    Years of education: Not on file    Highest education level: Not on file   Occupational History    Not on file   Tobacco Use    Smoking status: Former     Packs/day: 0.25     Years: 20.00     Pack years: 5.00     Types: Cigarettes     Quit date:      Years since quittin.0    Smokeless tobacco: Never   Substance and Sexual Activity    Alcohol use: No    Drug use:  No Sexual activity: Yes   Other Topics Concern    Not on file   Social History Narrative    Not on file     Social Determinants of Health     Financial Resource Strain: Low Risk     Difficulty of Paying Living Expenses: Not hard at all   Food Insecurity: No Food Insecurity    Worried About Running Out of Food in the Last Year: Never true    Ran Out of Food in the Last Year: Never true   Transportation Needs: Not on file   Physical Activity: Insufficiently Active    Days of Exercise per Week: 3 days    Minutes of Exercise per Session: 40 min   Stress: Not on file   Social Connections: Not on file   Intimate Partner Violence: Not on file   Housing Stability: Not on file     Family History   Problem Relation Age of Onset    Heart Disease Mother     High Blood Pressure Father     Heart Disease Father      Allergies   Allergen Reactions    Augmentin [Amoxicillin-Pot Clavulanate] Diarrhea    Cinnamic Acide     Balsam Rash     equivocal patch test     Linalool Rash     Positive patch test    Methylisothiazolinone Rash     Positive patch test    Other Rash     Positive patch test, fragrance mix     Current Outpatient Medications   Medication Sig Dispense Refill    traZODone (DESYREL) 100 MG tablet TAKE ONE TABLET BY MOUTH NIGHTLY 30 tablet 5    traMADol HCl 100 MG TABS Take 100 mg by mouth every 6 hours as needed (joint pain.) Max Daily Amount: 400 mg 28 tablet 0    valACYclovir (VALTREX) 500 MG tablet TAKE ONE TABLET BY MOUTH TWICE A DAY FOR 10 DAYS 60 tablet 1    pantoprazole (PROTONIX) 40 MG tablet Take 1 tablet by mouth every morning (before breakfast) 90 tablet 1    penciclovir (DENAVIR) 1 % cream Apply topically every 2 hours. 1.5 g 1    triamcinolone acetonide (KENALOG) 0.1 % paste Apply to canker sore as needed.  5 g 4    ibuprofen (ADVIL;MOTRIN) 800 MG tablet Take 1 tablet by mouth 2 times daily as needed for Pain 60 tablet 0    vitamin D (D 5000) 125 MCG (5000 UT) CAPS capsule TAKE 1 CAPSULE DAILY FOR VITAMIN D DEFICIENCY 90 capsule 3    rosuvastatin (CRESTOR) 5 MG tablet TAKE 1 TABLET DAILY 90 tablet 3    solifenacin (VESICARE) 10 MG tablet TAKE ONE TABLET BY MOUTH EVERY DAY 90 tablet 4    hydroCHLOROthiazide (HYDRODIURIL) 25 MG tablet Take 1 tablet by mouth daily as needed SWELLING. 60 tablet 11    hydrOXYzine (ATARAX) 50 MG tablet TAKE 1 TABLET EVERY 8 HOURS AS NEEDED FOR ITCHING 90 tablet 11    metFORMIN (GLUCOPHAGE) 500 MG tablet TAKE ONE-HALF (1/2) TABLET TWICE A DAY WITH MEALS 90 tablet 3    menthol-zinc oxide (CALMOSEPTINE) 0.44-20.6 % OINT ointment Apply topically daily Apply thin layer to rash nightly. 113 g 2    pramipexole (MIRAPEX) 0.125 MG tablet Take 1 tablet by mouth nightly 90 tablet 3    escitalopram (LEXAPRO) 20 MG tablet Take 10 mg initially for 7 days then increase to 20 mg daily by mouth. 90 tablet 4    ADVAIR DISKUS 100-50 MCG/DOSE diskus inhaler USE 1 INHALATION EVERY 12 HOURS, RINSE MOUTH OUT AFTER USE 3 each 3    albuterol sulfate HFA (VENTOLIN HFA) 108 (90 Base) MCG/ACT inhaler Inhale 1 puff into the lungs every 4 hours as needed for Wheezing or Shortness of Breath 2 Inhaler 5     No current facility-administered medications for this visit. PMH, Surgical Hx, Family Hx, and Social Hx reviewed and updated. Health Maintenance reviewed. Objective  Vitals:    01/30/23 1121   BP: 128/78   Site: Left Upper Arm   Position: Sitting   Cuff Size: Medium Adult   Pulse: (!) 102   Resp: 16   SpO2: 95%   Weight: 214 lb (97.1 kg)   Height: 5' 3\" (1.6 m)     BP Readings from Last 3 Encounters:   01/30/23 128/78   11/29/22 126/72   11/25/22 118/66     Wt Readings from Last 3 Encounters:   01/30/23 214 lb (97.1 kg)   11/29/22 226 lb 4.8 oz (102.6 kg)   11/25/22 216 lb 9.6 oz (98.2 kg)     Physical Exam  Vitals reviewed. Constitutional:       Appearance: Normal appearance. HENT:      Head: Normocephalic and atraumatic.       Right Ear: External ear normal.      Left Ear: External ear normal.      Nose: Nose normal.      Mouth/Throat:      Mouth: Mucous membranes are moist.   Cardiovascular:      Rate and Rhythm: Normal rate and regular rhythm. Pulmonary:      Effort: Pulmonary effort is normal.   Musculoskeletal:         General: Tenderness present. Skin:     General: Skin is warm and dry. Findings: No rash. Neurological:      General: No focal deficit present. Mental Status: She is alert and oriented to person, place, and time. Psychiatric:         Mood and Affect: Mood normal.         Behavior: Behavior normal.         Thought Content: Thought content normal.         Judgment: Judgment normal.     Assessment & Plan   Gunnar Stevenson was seen today for follow-up, chest pain and other. Diagnoses and all orders for this visit:    Abnormal CXR  -     CT CHEST WO CONTRAST; Future    Anxiety and depression  -     traZODone (DESYREL) 100 MG tablet; TAKE ONE TABLET BY MOUTH NIGHTLY    Lung calcification  -     CT CHEST WO CONTRAST; Future    Type 2 diabetes mellitus without complication, without long-term current use of insulin (HCC)  -     Hemoglobin A1C; Future  -     CBC; Future  -     Comprehensive Metabolic Panel; Future    Severe obesity (BMI 35.0-39. 9) with comorbidity (HCC)    RUQ pain  -     pantoprazole (PROTONIX) 40 MG tablet; Take 1 tablet by mouth every morning (before breakfast)    Pure hypercholesterolemia  -     Lipid Panel; Future    Vitamin D deficiency  -     Vitamin D 25 Hydroxy; Future    Herpes labialis  -     valACYclovir (VALTREX) 500 MG tablet; TAKE ONE TABLET BY MOUTH TWICE A DAY FOR 10 DAYS  -     penciclovir (DENAVIR) 1 % cream; Apply topically every 2 hours.     Chronic midline low back pain without sciatica  -     traMADol HCl 100 MG TABS; Take 100 mg by mouth every 6 hours as needed (joint pain.) Max Daily Amount: 400 mg    Chronic right hip pain  -     traMADol HCl 100 MG TABS; Take 100 mg by mouth every 6 hours as needed (joint pain.) Max Daily Amount: 400 mg    Aphthous ulcer  -     triamcinolone acetonide (KENALOG) 0.1 % paste; Apply to canker sore as needed. PRN medications refilled today. CT scan of chest for evaluation of calcification; resume protonix. Discussed GI referral, if needed. 4 month follow up with me. Contact office with any questions or concerns. Orders Placed This Encounter   Procedures    CT CHEST WO CONTRAST     Standing Status:   Future     Standing Expiration Date:   1/30/2024     Order Specific Question:   Reason for exam:     Answer:   large paratracheal lung calcification, increased reflux. Hemoglobin A1C     Standing Status:   Future     Standing Expiration Date:   1/30/2024    Lipid Panel     Standing Status:   Future     Standing Expiration Date:   1/30/2024     Order Specific Question:   Is Patient Fasting?/# of Hours     Answer:   yes     Order Specific Question:   Has the patient fasted? Answer:   Yes    CBC     Standing Status:   Future     Standing Expiration Date:   1/30/2024    Comprehensive Metabolic Panel     Standing Status:   Future     Standing Expiration Date:   1/30/2024    Vitamin D 25 Hydroxy     Standing Status:   Future     Standing Expiration Date:   1/30/2024     Orders Placed This Encounter   Medications    traZODone (DESYREL) 100 MG tablet     Sig: TAKE ONE TABLET BY MOUTH NIGHTLY     Dispense:  30 tablet     Refill:  5    traMADol HCl 100 MG TABS     Sig: Take 100 mg by mouth every 6 hours as needed (joint pain.) Max Daily Amount: 400 mg     Dispense:  28 tablet     Refill:  0     Reduce doses taken as pain becomes manageable    valACYclovir (VALTREX) 500 MG tablet     Sig: TAKE ONE TABLET BY MOUTH TWICE A DAY FOR 10 DAYS     Dispense:  60 tablet     Refill:  1    pantoprazole (PROTONIX) 40 MG tablet     Sig: Take 1 tablet by mouth every morning (before breakfast)     Dispense:  90 tablet     Refill:  1    penciclovir (DENAVIR) 1 % cream     Sig: Apply topically every 2 hours.      Dispense:  1.5 g     Refill: 1    triamcinolone acetonide (KENALOG) 0.1 % paste     Sig: Apply to canker sore as needed. Dispense:  5 g     Refill:  4     Medications Discontinued During This Encounter   Medication Reason    traZODone (DESYREL) 100 MG tablet REORDER    traMADol HCl 100 MG TABS REORDER    valACYclovir (VALTREX) 500 MG tablet REORDER     No follow-ups on file. Reviewed with the patient: current clinical status, medications, activities and diet. Side effects, adverse effects of the medication prescribed today, as well as treatment plan/ rationale and result expectations have been discussed with the patient who expresses understanding and desires to proceed. Close follow up to evaluate treatment results and for coordination of care. I have reviewed the patient's medical history in detail and updated the computerized patient record.     Milagros Huang PA-C

## 2023-01-30 NOTE — PATIENT INSTRUCTIONS
Preventing Falls: Care Instructions  Overview     Getting around your home safely can be a challenge if you have injuries or health problems that make it easy for you to fall. Loose rugs and furniture in walkways are among the dangers for many older people who have problems walking or who have poor eyesight. People who have conditions such as arthritis, osteoporosis, or dementia also have to be careful not to fall. You can make your home safer with a few simple measures. Follow-up care is a key part of your treatment and safety. Be sure to make and go to all appointments, and call your doctor if you are having problems. It's also a good idea to know your test results and keep a list of the medicines you take. How can you care for yourself at home? Taking care of yourself  Exercise regularly to improve your strength, muscle tone, and balance. Walk if you can. Swimming may be a good choice if you cannot walk easily. Have your vision and hearing checked each year or any time you notice a change. If you have trouble seeing and hearing, you might not be able to avoid objects and could lose your balance. Know the side effects of the medicines you take. Ask your doctor or pharmacist whether the medicines you take can affect your balance. Sleeping pills or sedatives can affect your balance. Limit the amount of alcohol you drink. Alcohol can impair your balance and other senses. Ask your doctor whether calluses or corns on your feet need to be removed. If you wear loose-fitting shoes because of calluses or corns, you can lose your balance and fall. Talk to your doctor if you have numbness in your feet. You may get dizzy if you do not drink enough water. To prevent dehydration, drink plenty of fluids. Choose water and other clear liquids. If you have kidney, heart, or liver disease and have to limit fluids, talk with your doctor before you increase the amount of fluids you drink.   Preventing falls at home  Remove raised doorway thresholds, throw rugs, and clutter. Repair loose carpet or raised areas in the floor. Move furniture and electrical cords to keep them out of walking paths. Use nonskid floor wax, and wipe up spills right away, especially on ceramic tile floors. If you use a walker or cane, put rubber tips on it. If you use crutches, clean the bottoms of them regularly with an abrasive pad, such as steel wool. Keep your house well lit, especially stairways, porches, and outside walkways. Use night-lights in areas such as hallways and bathrooms. Add extra light switches or use remote switches (such as switches that go on or off when you clap your hands) to make it easier to turn lights on if you have to get up during the night. Install sturdy handrails on stairways. Move items in your cabinets so that the things you use a lot are on the lower shelves (about waist level). Keep a cordless phone and a flashlight with new batteries by your bed. If possible, put a phone in each of the main rooms of your house, or carry a cell phone in case you fall and cannot reach a phone. Or, you can wear a device around your neck or wrist. You push a button that sends a signal for help. Wear low-heeled shoes that fit well and give your feet good support. Use footwear with nonskid soles. Check the heels and soles of your shoes for wear. Repair or replace worn heels or soles. Do not wear socks without shoes on smooth floors, such as wood. Walk on the grass when the sidewalks are slippery. If you live in an area that gets snow and ice in the winter, sprinkle salt on slippery steps and sidewalks. Or ask a family member or friend to do this for you. Preventing falls in the bath  Install grab bars and nonskid mats inside and outside your shower or tub and near the toilet and sinks. Use shower chairs and bath benches. Use a hand-held shower head that will allow you to sit while showering.   Get into a tub or shower by putting the weaker leg in first. Get out of a tub or shower with your strong side first.  Repair loose toilet seats and consider installing a raised toilet seat to make getting on and off the toilet easier. Keep your bathroom door unlocked while you are in the shower. Where can you learn more? Go to http://www.ribera.com/ and enter G117 to learn more about \"Preventing Falls: Care Instructions. \"  Current as of: May 4, 2022               Content Version: 13.5  © 3673-4716 Healthwise, Incorporated. Care instructions adapted under license by [facility] ([facility]). If you have questions about a medical condition or this instruction, always ask your healthcare professional. [address] any warranty or liability for your use of this information. Hearing Loss: Care Instructions  Overview     Hearing loss is a sudden or slow decrease in how well you hear. It can range from mild to severe. Permanent hearing loss can occur with aging. It also can happen when you are exposed long-term to loud noise. Examples include listening to loud music, riding motorcycles, or being around other loud machines. Hearing loss can affect your work and home life. It can make you feel lonely or depressed. You may feel that you have lost your independence. But hearing aids and other devices can help you hear better and feel connected to others. Follow-up care is a key part of your treatment and safety. Be sure to make and go to all appointments, and call your doctor if you are having problems. It's also a good idea to know your test results and keep a list of the medicines you take. How can you care for yourself at home? Avoid loud noises whenever possible. This helps keep your hearing from getting worse. Always wear hearing protection around loud noises. Wear a hearing aid as directed. See a professional who can help you pick a hearing aid that fits you. Have hearing tests as your doctor suggests. They can show whether your hearing has changed. Your hearing aid may need to be adjusted.  Use other devices as needed. These may include:  Telephone amplifiers and hearing aids that can connect to a television, stereo, radio, or microphone.  Devices that use lights or vibrations. These alert you to the doorbell, a ringing telephone, or a baby monitor.  Television closed-captioning. This shows the words at the bottom of the screen. Most new TVs can do this.  TTY (text telephone). This lets you type messages back and forth on the telephone instead of talking or listening. These devices are also called TDD. When messages are typed on the keyboard, they are sent over the phone line to a receiving TTY. The message is shown on a monitor.  Use text messaging, social media, and email if it is hard for you to communicate by telephone.  Try to learn a listening technique called speechreading. It is not lipreading. You pay attention to people's gestures, expressions, posture, and tone of voice. These clues can help you understand what a person is saying. Face the person you are talking to, and have them face you. Make sure the lighting is good. You need to see the other person's face clearly.  Think about counseling if you need help to adjust to your hearing loss.  When should you call for help?  Watch closely for changes in your health, and be sure to contact your doctor if:    You think your hearing is getting worse.     You have new symptoms, such as dizziness or nausea.   Where can you learn more?  Go to https://www.[employer].net/patientEd and enter R798 to learn more about \"Hearing Loss: Care Instructions.\"  Current as of: May 4, 2022               Content Version: 13.5  © 2218-3641 [employer].   Care instructions adapted under license by [employer]. If you have questions about a medical condition or this instruction, always ask your healthcare professional. [employer] disclaims any  warranty or liability for your use of this information. Learning About Vision Tests  What are vision tests? The four most common vision tests are visual acuity tests, refraction, visual field tests, and color vision tests. Visual acuity (sharpness) tests  These tests are used: To see if you need glasses or contact lenses. To monitor an eye problem. To check an eye injury. Visual acuity tests are done as part of routine exams. You may also have this test when you get your 's license or apply for some types of jobs. Visual field tests  These tests are used: To check for vision loss in any area of your range of vision. To screen for certain eye diseases. To look for nerve damage after a stroke, head injury, or other problem that could reduce blood flow to the brain. Refraction and color tests  A refraction test is done to find the right prescription for glasses and contact lenses. A color vision test is done to check for color blindness. Color vision is often tested as part of a routine exam. You may also have this test when you apply for a job where recognizing different colors is important, such as , electronics, or the [employer]. How are vision tests done? Visual acuity test   You cover one eye at a time. You read aloud from a wall chart across the room. You read aloud from a small card that you hold in your hand. Refraction   You look into a special device. The device puts lenses of different strengths in front of each eye to see how strong your glasses or contact lenses need to be. Visual field tests   Your doctor may have you look through special machines. Or your doctor may simply have you stare straight ahead while they move a finger into and out of your field of vision. Color vision test   You look at pieces of printed test patterns in various colors. You say what number or symbol you see.   Your doctor may have you trace the number or symbol using a pointer. How do these tests feel? There is very little chance of having a problem from this test. If dilating drops are used for a vision test, they may make the eyes sting and cause a medicine taste in the mouth. Follow-up care is a key part of your treatment and safety. Be sure to make and go to all appointments, and call your doctor if you are having problems. It's also a good idea to know your test results and keep a list of the medicines you take. Where can you learn more? Go to http://www.ribera.com/ and enter G551 to learn more about \"Learning About Vision Tests. \"  Current as of: October 12, 2022               Content Version: 13.5  © 2006-2022 [employer]. Care instructions adapted under license by [facility] ([facility]). If you have questions about a medical condition or this instruction, always ask your healthcare professional. [place] [address] any warranty or liability for your use of this information. Advance Directives: Care Instructions  Overview  An advance directive is a legal way to state your wishes at the end of your life. It tells your family and your doctor what to do if you can't say what you want. There are two main types of advance directives. You can change them any time your wishes change. Living will. This form tells your family and your doctor your wishes about life support and other treatment. The form is also called a declaration. Medical power of . This form lets you name a person to make treatment decisions for you when you can't speak for yourself. This person is called a health care agent (health care proxy, health care surrogate). The form is also called a durable power of  for health care. If you do not have an advance directive, decisions about your medical care may be made by a family member, or by a doctor or a  who doesn't know you.   It may help to think of an advance directive as a gift to the people who care for you. If you have one, they won't have to make tough decisions by themselves. For more information, including forms for your state, see the [address] website (www.caringinfo.org/planning/advance-directives/). Follow-up care is a key part of your treatment and safety. Be sure to make and go to all appointments, and call your doctor if you are having problems. It's also a good idea to know your test results and keep a list of the medicines you take. What should you include in an advance directive? Many states have a unique advance directive form. (It may ask you to address specific issues.) Or you might use a universal form that's approved by many states. If your form doesn't tell you what to address, it may be hard to know what to include in your advance directive. Use the questions below to help you get started. Who do you want to make decisions about your medical care if you are not able to? What life-support measures do you want if you have a serious illness that gets worse over time or can't be cured? What are you most afraid of that might happen? (Maybe you're afraid of having pain, losing your independence, or being kept alive by machines.)  Where would you prefer to die? (Your home? A hospital? A nursing home?)  Do you want to donate your organs when you die? Do you want certain Anabaptism practices performed before you die? When should you call for help? Be sure to contact your doctor if you have any questions. Where can you learn more? Go to http://www.ribera.com/ and enter R264 to learn more about \"Advance Directives: Care Instructions. \"  Current as of: June 16, 2022               Content Version: 13.5  © 8187-1881 Healthwise, Incorporated. Care instructions adapted under license by [facility] ([facility]).  If you have questions about a medical condition or this instruction, always ask your healthcare professional. Srinivasa Longoria disclaims any warranty or liability for your use of this information. Personalized Preventive Plan for Sara Obrien - 1/30/2023  Medicare offers a range of preventive health benefits. Some of the tests and screenings are paid in full while other may be subject to a deductible, co-insurance, and/or copay. Some of these benefits include a comprehensive review of your medical history including lifestyle, illnesses that may run in your family, and various assessments and screenings as appropriate. After reviewing your medical record and screening and assessments performed today your provider may have ordered immunizations, labs, imaging, and/or referrals for you. A list of these orders (if applicable) as well as your Preventive Care list are included within your After Visit Summary for your review. Other Preventive Recommendations:    A preventive eye exam performed by an eye specialist is recommended every 1-2 years to screen for glaucoma; cataracts, macular degeneration, and other eye disorders. A preventive dental visit is recommended every 6 months. Try to get at least 150 minutes of exercise per week or 10,000 steps per day on a pedometer . Order or download the FREE \"Exercise & Physical Activity: Your Everyday Guide\" from [employer]. Call 6-252.142.9832 or search [employer] online. You need 2683-3694 mg of calcium and 0383-9921 IU of vitamin D per day. It is possible to meet your calcium requirement with diet alone, but a vitamin D supplement is usually necessary to meet this goal.  When exposed to the sun, use a sunscreen that protects against both UVA and UVB radiation with an SPF of 30 or greater. Reapply every 2 to 3 hours or after sweating, drying off with a towel, or swimming. Always wear a seat belt when traveling in a car. Always wear a helmet when riding a bicycle or motorcycle.

## 2023-01-30 NOTE — PROGRESS NOTES
Medicare Annual Wellness Visit    Amando Baig is here for Medicare AWV    Assessment & Plan   Initial Medicare annual wellness visit      Recommendations for Preventive Services Due: see orders and patient instructions/AVS.  Recommended screening schedule for the next 5-10 years is provided to the patient in written form: see Patient Instructions/AVS.     Return for Medicare Annual Wellness Visit in 1 year. Subjective     Patient's complete Health Risk Assessment and screening values have been reviewed and are found in Flowsheets. The following problems were reviewed today and where indicated follow up appointments were made and/or referrals ordered. Positive Risk Factor Screenings with Interventions:    Fall Risk:  Do you feel unsteady or are you worried about falling? : no  2 or more falls in past year?: no  Fall with injury in past year?: (!) yes     Interventions:    Improving, slipped through her deck and injured LLE             Opioid Risk: (Low risk score <55) Opioid risk score: 5    Patient is low risk for opioid use disorder or overdose. Last PDMP Tj as Reviewed:  Review User Review Instant Review Result                    Weight and Activity:  Physical Activity: Insufficiently Active    Days of Exercise per Week: 3 days    Minutes of Exercise per Session: 40 min     On average, how many days per week do you engage in moderate to strenuous exercise (like a brisk walk)?: 3 days  Have you lost any weight without trying in the past 3 months?: No       Obesity Interventions:  Exercising eating well            Vision Screen:  Do you have difficulty driving, watching TV, or doing any of your daily activities because of your eyesight?: (!) Yes  Have you had an eye exam within the past year?: (!) No  No results found.     Interventions:   Patient encouraged to make appointment with their eye specialist    Safety:  Do you have any tripping hazards - loose or unsecured carpets or rugs?: (!) Yes  Interventions:  Low fall risk                     Objective   There were no vitals filed for this visit. There is no height or weight on file to calculate BMI. Allergies   Allergen Reactions    Augmentin [Amoxicillin-Pot Clavulanate] Diarrhea    Cinnamic Acide     Balsam Rash     equivocal patch test     Linalool Rash     Positive patch test    Methylisothiazolinone Rash     Positive patch test    Other Rash     Positive patch test, fragrance mix     Prior to Visit Medications    Medication Sig Taking? Authorizing Provider   valACYclovir (VALTREX) 500 MG tablet TAKE ONE TABLET BY MOUTH TWICE A DAY FOR 10 DAYS  Milagros Huang PA-C   ibuprofen (ADVIL;MOTRIN) 800 MG tablet Take 1 tablet by mouth 2 times daily as needed for Pain  G[place]ma [place], APRN - CNP   vitamin D (D 5000) 125 MCG (5000 UT) CAPS capsule TAKE 1 CAPSULE DAILY FOR VITAMIN D DEFICIENCY  Milagros Huang PA-C   rosuvastatin (CRESTOR) 5 MG tablet TAKE 1 TABLET DAILY  Milagros Huang PA-C   solifenacin (VESICARE) 10 MG tablet TAKE ONE TABLET BY MOUTH EVERY DAY  Milagros Huang PA-C   hydroCHLOROthiazide (HYDRODIURIL) 25 MG tablet Take 1 tablet by mouth daily as needed SWELLING. Milagros Huang PA-C   hydrOXYzine (ATARAX) 50 MG tablet TAKE 1 TABLET EVERY 8 HOURS AS NEEDED FOR ITCHING  Milagros Huang PA-C   metFORMIN (GLUCOPHAGE) 500 MG tablet TAKE ONE-HALF (1/2) TABLET TWICE A DAY WITH MEALS  Milagros Huang PA-C   menthol-zinc oxide (CALMOSEPTINE) 0.44-20.6 % OINT ointment Apply topically daily Apply thin layer to rash nightly. Milagros Huang PA-C   pramipexole (MIRAPEX) 0.125 MG tablet Take 1 tablet by mouth nightly  Milagros Huang PA-C   escitalopram (LEXAPRO) 20 MG tablet Take 10 mg initially for 7 days then increase to 20 mg daily by mouth.   Milagros uHang PA-C   ADVAIR DISKUS 100-50 MCG/DOSE diskus inhaler USE 1 INHALATION EVERY 12 HOURS, RINSE MOUTH OUT AFTER USE  Milagros Huang PA-C   albuterol sulfate HFA (VENTOLIN HFA) 108 (90 Base) MCG/ACT inhaler Inhale 1 puff into the lungs every 4 hours as needed for Wheezing or Shortness of Breath  Milagros Huang PA-C   traMADol HCl 100 MG TABS Take 100 mg by mouth every 6 hours as needed  Historical Provider, MD   traZODone (DESYREL) 100 MG tablet TAKE ONE TABLET BY MOUTH NIGHTLY  Milagros Huang PA-C       CareTeam (Including outside providers/suppliers regularly involved in providing care):   Patient Care Team:  Roz Cook PA-C as PCP - General (Family Medicine)  Roz Cook PA-C as PCP - [facility] Empaneled Provider     Reviewed and updated this visit:  Allergies  Meds  Problems

## 2023-02-03 ENCOUNTER — HOSPITAL ENCOUNTER (OUTPATIENT)
Dept: CT IMAGING | Age: 68
Discharge: HOME | End: 2023-02-03
Payer: COMMERCIAL

## 2023-02-03 ENCOUNTER — HOSPITAL ENCOUNTER (OUTPATIENT)
Dept: LAB | Age: 68
Discharge: HOME | End: 2023-02-03
Payer: COMMERCIAL

## 2023-02-03 DIAGNOSIS — J98.4 LUNG CALCIFICATION: ICD-10-CM

## 2023-02-03 DIAGNOSIS — E78.00 PURE HYPERCHOLESTEROLEMIA: ICD-10-CM

## 2023-02-03 DIAGNOSIS — E55.9 VITAMIN D DEFICIENCY: ICD-10-CM

## 2023-02-03 DIAGNOSIS — R93.89 ABNORMAL CXR: ICD-10-CM

## 2023-02-03 DIAGNOSIS — E11.9 TYPE 2 DIABETES MELLITUS WITHOUT COMPLICATION, WITHOUT LONG-TERM CURRENT USE OF INSULIN (HCC): ICD-10-CM

## 2023-02-03 LAB
ALBUMIN SERPL-MCNC: 4.4 G/DL (ref 3.5–4.6)
ALP BLD-CCNC: 57 U/L (ref 40–130)
ALT SERPL-CCNC: 14 U/L (ref 0–33)
ANION GAP SERPL CALCULATED.3IONS-SCNC: 11 MEQ/L (ref 9–15)
AST SERPL-CCNC: 21 U/L (ref 0–35)
BILIRUB SERPL-MCNC: 0.7 MG/DL (ref 0.2–0.7)
BUN BLDV-MCNC: 23 MG/DL (ref 8–23)
CALCIUM SERPL-MCNC: 10 MG/DL (ref 8.5–9.9)
CHLORIDE BLD-SCNC: 103 MEQ/L (ref 95–107)
CHOLESTEROL, TOTAL: 168 MG/DL (ref 0–199)
CO2: 29 MEQ/L (ref 20–31)
CREAT SERPL-MCNC: 1.1 MG/DL (ref 0.5–0.9)
GFR SERPL CREATININE-BSD FRML MDRD: 55.3 ML/MIN/{1.73_M2}
GLOBULIN: 3.1 G/DL (ref 2.3–3.5)
GLUCOSE BLD-MCNC: 98 MG/DL (ref 70–99)
HBA1C MFR BLD: 5.6 % (ref 4.8–5.9)
HCT VFR BLD CALC: 42.1 % (ref 37–47)
HDLC SERPL-MCNC: 62 MG/DL (ref 40–59)
HEMOGLOBIN: 13.9 G/DL (ref 12–16)
LDL CHOLESTEROL CALCULATED: 89 MG/DL (ref 0–129)
MCH RBC QN AUTO: 30.5 PG (ref 27–31.3)
MCHC RBC AUTO-ENTMCNC: 33 % (ref 33–37)
MCV RBC AUTO: 92.5 FL (ref 79.4–94.8)
PDW BLD-RTO: 14.7 % (ref 11.5–14.5)
PLATELET # BLD: 196 K/UL (ref 130–400)
POTASSIUM SERPL-SCNC: 4.5 MEQ/L (ref 3.4–4.9)
RBC # BLD: 4.56 M/UL (ref 4.2–5.4)
SODIUM BLD-SCNC: 143 MEQ/L (ref 135–144)
TOTAL PROTEIN: 7.5 G/DL (ref 6.3–8)
TRIGL SERPL-MCNC: 84 MG/DL (ref 0–150)
WBC # BLD: 7.3 K/UL (ref 4.8–10.8)

## 2023-02-03 PROCEDURE — 83036 HEMOGLOBIN GLYCOSYLATED A1C: CPT

## 2023-02-03 PROCEDURE — 80053 COMPREHEN METABOLIC PANEL: CPT

## 2023-02-03 PROCEDURE — 82306 VITAMIN D 25 HYDROXY: CPT

## 2023-02-03 PROCEDURE — 80061 LIPID PANEL: CPT

## 2023-02-03 PROCEDURE — 36415 COLL VENOUS BLD VENIPUNCTURE: CPT

## 2023-02-03 PROCEDURE — 71250 CT THORAX DX C-: CPT

## 2023-02-03 PROCEDURE — 85027 COMPLETE CBC AUTOMATED: CPT

## 2023-02-04 LAB — VITAMIN D 25-HYDROXY: 72 NG/ML

## 2023-02-23 ENCOUNTER — TELEPHONE (OUTPATIENT)
Dept: FAMILY MEDICINE CLINIC | Age: 68
End: 2023-02-23

## 2023-02-23 NOTE — TELEPHONE ENCOUNTER
Leatha Orellana called stated that solifenacin (VESICARE) 10 MG tablet   will no longer be covered by her insurance and will need it changed.     Preferred meds-  myrbetriq, oxybutynin or fesoterodine  Shes uses [facility]

## 2023-03-15 DIAGNOSIS — G25.81 RLS (RESTLESS LEGS SYNDROME): ICD-10-CM

## 2023-03-16 RX ORDER — PRAMIPEXOLE DIHYDROCHLORIDE 0.12 MG/1
0.12 TABLET ORAL NIGHTLY
Qty: 90 TABLET | Refills: 3 | Status: SHIPPED | OUTPATIENT
Start: 2023-03-16

## 2023-03-16 NOTE — TELEPHONE ENCOUNTER
Patient is requesting medication refill.  Please approve or deny this request.    Rx requested:  Requested Prescriptions     Pending Prescriptions Disp Refills    pramipexole (MIRAPEX) 0.125 MG tablet 90 tablet 3     Sig: Take 1 tablet by mouth nightly         Last Office Visit:   1/30/2023      Next Visit Date:  Future Appointments   Date Time Provider Dayne Cullen   5/30/2023 10:00 AM Sigifredo Rojas [facility]

## 2023-03-24 DIAGNOSIS — F41.9 ANXIETY AND DEPRESSION: ICD-10-CM

## 2023-03-24 DIAGNOSIS — F32.A ANXIETY AND DEPRESSION: ICD-10-CM

## 2023-03-24 RX ORDER — ESCITALOPRAM OXALATE 20 MG/1
TABLET ORAL
Qty: 90 TABLET | Refills: 3 | Status: SHIPPED | OUTPATIENT
Start: 2023-03-24

## 2023-03-24 NOTE — TELEPHONE ENCOUNTER
Patient is requesting medication refill.  Please approve or deny this request.    Rx requested:  Requested Prescriptions     Pending Prescriptions Disp Refills    escitalopram (LEXAPRO) 20 MG tablet [Pharmacy Med Name: ESCITALOPRAM TABS 20MG] 90 tablet 3     Sig: AT THE START OF THERAPY, TAKE ONE-HALF (1/2) TABLET (10 MG) DAILY FOR 7 DAYS, THEN TAKE 1 TABLET DAILY THEREAFTER (DISCONTINUE CELEXA)         Last Office Visit:   1/30/2023      Next Visit Date:  Future Appointments   Date Time Provider Dayne Cullen   5/30/2023 10:00 AM Mansoor Greer PA-C [address]

## 2023-05-30 ENCOUNTER — OFFICE VISIT (OUTPATIENT)
Dept: FAMILY MEDICINE CLINIC | Age: 68
End: 2023-05-30
Payer: COMMERCIAL

## 2023-05-30 VITALS
OXYGEN SATURATION: 95 % | SYSTOLIC BLOOD PRESSURE: 120 MMHG | HEART RATE: 85 BPM | BODY MASS INDEX: 40.4 KG/M2 | HEIGHT: 63 IN | DIASTOLIC BLOOD PRESSURE: 72 MMHG | RESPIRATION RATE: 18 BRPM | WEIGHT: 228 LBS

## 2023-05-30 DIAGNOSIS — R63.5 WEIGHT GAIN: ICD-10-CM

## 2023-05-30 DIAGNOSIS — E11.9 TYPE 2 DIABETES MELLITUS WITHOUT COMPLICATION, WITHOUT LONG-TERM CURRENT USE OF INSULIN (HCC): ICD-10-CM

## 2023-05-30 DIAGNOSIS — N32.81 OVERACTIVE BLADDER: ICD-10-CM

## 2023-05-30 DIAGNOSIS — Z12.31 BREAST CANCER SCREENING BY MAMMOGRAM: ICD-10-CM

## 2023-05-30 DIAGNOSIS — E11.9 TYPE 2 DIABETES MELLITUS WITHOUT COMPLICATION, WITHOUT LONG-TERM CURRENT USE OF INSULIN (HCC): Primary | ICD-10-CM

## 2023-05-30 DIAGNOSIS — E88.81 INSULIN RESISTANCE: ICD-10-CM

## 2023-05-30 DIAGNOSIS — Z12.11 COLON CANCER SCREENING: ICD-10-CM

## 2023-05-30 LAB
CREAT UR-MCNC: 164.3 MG/DL
MICROALBUMIN UR-MCNC: <1.2 MG/DL
MICROALBUMIN/CREAT UR-RTO: NORMAL MG/G (ref 0–30)

## 2023-05-30 PROCEDURE — 1090F PRES/ABSN URINE INCON ASSESS: CPT | Performed by: PHYSICIAN ASSISTANT

## 2023-05-30 PROCEDURE — 99214 OFFICE O/P EST MOD 30 MIN: CPT | Performed by: PHYSICIAN ASSISTANT

## 2023-05-30 PROCEDURE — G8417 CALC BMI ABV UP PARAM F/U: HCPCS | Performed by: PHYSICIAN ASSISTANT

## 2023-05-30 PROCEDURE — G8427 DOCREV CUR MEDS BY ELIG CLIN: HCPCS | Performed by: PHYSICIAN ASSISTANT

## 2023-05-30 PROCEDURE — 1123F ACP DISCUSS/DSCN MKR DOCD: CPT | Performed by: PHYSICIAN ASSISTANT

## 2023-05-30 PROCEDURE — 3017F COLORECTAL CA SCREEN DOC REV: CPT | Performed by: PHYSICIAN ASSISTANT

## 2023-05-30 PROCEDURE — 2022F DILAT RTA XM EVC RTNOPTHY: CPT | Performed by: PHYSICIAN ASSISTANT

## 2023-05-30 PROCEDURE — G8399 PT W/DXA RESULTS DOCUMENT: HCPCS | Performed by: PHYSICIAN ASSISTANT

## 2023-05-30 PROCEDURE — 1036F TOBACCO NON-USER: CPT | Performed by: PHYSICIAN ASSISTANT

## 2023-05-30 PROCEDURE — 3044F HG A1C LEVEL LT 7.0%: CPT | Performed by: PHYSICIAN ASSISTANT

## 2023-05-30 RX ORDER — CHOLECALCIFEROL (VITAMIN D3) 125 MCG
5 CAPSULE ORAL DAILY
COMMUNITY

## 2023-05-30 RX ORDER — OXYBUTYNIN CHLORIDE 5 MG/1
5 TABLET, EXTENDED RELEASE ORAL DAILY
Qty: 30 TABLET | Refills: 3 | Status: SHIPPED | OUTPATIENT
Start: 2023-05-30

## 2023-05-30 SDOH — ECONOMIC STABILITY: FOOD INSECURITY: WITHIN THE PAST 12 MONTHS, THE FOOD YOU BOUGHT JUST DIDN'T LAST AND YOU DIDN'T HAVE MONEY TO GET MORE.: NEVER TRUE

## 2023-05-30 SDOH — ECONOMIC STABILITY: HOUSING INSECURITY
IN THE LAST 12 MONTHS, WAS THERE A TIME WHEN YOU DID NOT HAVE A STEADY PLACE TO SLEEP OR SLEPT IN A SHELTER (INCLUDING NOW)?: NO

## 2023-05-30 SDOH — ECONOMIC STABILITY: INCOME INSECURITY: HOW HARD IS IT FOR YOU TO PAY FOR THE VERY BASICS LIKE FOOD, HOUSING, MEDICAL CARE, AND HEATING?: NOT HARD AT ALL

## 2023-05-30 SDOH — ECONOMIC STABILITY: FOOD INSECURITY: WITHIN THE PAST 12 MONTHS, YOU WORRIED THAT YOUR FOOD WOULD RUN OUT BEFORE YOU GOT MONEY TO BUY MORE.: NEVER TRUE

## 2023-05-30 ASSESSMENT — ENCOUNTER SYMPTOMS
BACK PAIN: 0
CHEST TIGHTNESS: 0
CONSTIPATION: 0
COLOR CHANGE: 0
NAUSEA: 0
COUGH: 0
ABDOMINAL PAIN: 0
DIARRHEA: 0
RECTAL PAIN: 0
ABDOMINAL DISTENTION: 0
SHORTNESS OF BREATH: 0
VOMITING: 0
WHEEZING: 0

## 2023-05-30 NOTE — PROGRESS NOTES
Subjective  Kelly Michelle, 78 y.o. female presents today with:  Chief Complaint   Patient presents with    Follow-up     4 month follow up states she had surgery on her knee march 2nd    Weight Loss     States her weight has been going up      HPI  In the office today for follow up. Last OV with me: 1/30/23. UTD with labs. Due for mammogram.  Due for cologuard in August.      History of paraesophageal calcification. Known history for many years. Previously discussed with Dr. Ahmet Yepez when she was having reflux-type symptoms. He prescribed protonix which she took for a period of time. Stopped medication about 2 years ago. Most recently started having shart epigastric pain, midsternum. Similar to past reflux. Started taking an old Rx of protonix--which has started to help. She has not had repeat imaging of her chest to monitor any change/growth of calcification. Remains on chronic medications, doing well. Has been working out 4-5 times/week. Eating well. Remains active with her grandchildren. Recently gained about 15 lbs despite eating well/exercising. Voices frustration with weight gain. Open to discussing bariatric surgery. Lost about 75 lb several years ago, has since gained back 30 lbs. Recently had R knee scope with Dr. Ceasar Cisse. Had meniscus repair on 3/2/23. Did well with surgery and with PT. Back to normal activity. Intermittent knee pain/discomfort. Requesting new Rx for OAB. Was on vesicare but coverage/cost increased. Currently taking 25 mg myrbetriq with good results but also $.     Review of Systems   Constitutional:  Positive for unexpected weight change (gain). Negative for activity change, appetite change, chills, diaphoresis, fatigue and fever. HENT:  Negative for congestion. Respiratory:  Negative for cough, chest tightness, shortness of breath and wheezing. Cardiovascular:  Negative for chest pain, palpitations and leg swelling.

## 2023-06-08 ENCOUNTER — HOSPITAL ENCOUNTER (OUTPATIENT)
Dept: WOMENS IMAGING | Age: 68
Discharge: HOME | End: 2023-06-10
Payer: COMMERCIAL

## 2023-06-08 ENCOUNTER — PREP FOR PROCEDURE (OUTPATIENT)
Dept: BARIATRICS/WEIGHT MGMT | Age: 68
End: 2023-06-08

## 2023-06-08 ENCOUNTER — OFFICE VISIT (OUTPATIENT)
Dept: BARIATRICS/WEIGHT MGMT | Age: 68
End: 2023-06-08
Payer: COMMERCIAL

## 2023-06-08 VITALS
WEIGHT: 234.2 LBS | SYSTOLIC BLOOD PRESSURE: 118 MMHG | OXYGEN SATURATION: 98 % | HEART RATE: 67 BPM | BODY MASS INDEX: 41.5 KG/M2 | DIASTOLIC BLOOD PRESSURE: 64 MMHG | HEIGHT: 63 IN

## 2023-06-08 DIAGNOSIS — E66.9 DIABETES MELLITUS TYPE 2 IN OBESE (HCC): ICD-10-CM

## 2023-06-08 DIAGNOSIS — E66.01 MORBID OBESITY (HCC): ICD-10-CM

## 2023-06-08 DIAGNOSIS — N32.81 OVERACTIVE BLADDER: ICD-10-CM

## 2023-06-08 DIAGNOSIS — E11.69 DIABETES MELLITUS TYPE 2 IN OBESE (HCC): ICD-10-CM

## 2023-06-08 DIAGNOSIS — E66.01 MORBID OBESITY (HCC): Primary | ICD-10-CM

## 2023-06-08 DIAGNOSIS — Z12.31 BREAST CANCER SCREENING BY MAMMOGRAM: ICD-10-CM

## 2023-06-08 DIAGNOSIS — K21.9 GASTROESOPHAGEAL REFLUX DISEASE, UNSPECIFIED WHETHER ESOPHAGITIS PRESENT: ICD-10-CM

## 2023-06-08 PROBLEM — M79.669 CALF TENDERNESS: Status: ACTIVE | Noted: 2023-03-13

## 2023-06-08 LAB — TSH REFLEX: 2.09 UIU/ML (ref 0.44–3.86)

## 2023-06-08 PROCEDURE — 2022F DILAT RTA XM EVC RTNOPTHY: CPT | Performed by: SURGERY

## 2023-06-08 PROCEDURE — 1036F TOBACCO NON-USER: CPT | Performed by: SURGERY

## 2023-06-08 PROCEDURE — 3044F HG A1C LEVEL LT 7.0%: CPT | Performed by: SURGERY

## 2023-06-08 PROCEDURE — G8427 DOCREV CUR MEDS BY ELIG CLIN: HCPCS | Performed by: SURGERY

## 2023-06-08 PROCEDURE — 1123F ACP DISCUSS/DSCN MKR DOCD: CPT | Performed by: SURGERY

## 2023-06-08 PROCEDURE — 99205 OFFICE O/P NEW HI 60 MIN: CPT | Performed by: SURGERY

## 2023-06-08 PROCEDURE — 77063 BREAST TOMOSYNTHESIS BI: CPT

## 2023-06-08 PROCEDURE — 1090F PRES/ABSN URINE INCON ASSESS: CPT | Performed by: SURGERY

## 2023-06-08 PROCEDURE — G8399 PT W/DXA RESULTS DOCUMENT: HCPCS | Performed by: SURGERY

## 2023-06-08 PROCEDURE — G8417 CALC BMI ABV UP PARAM F/U: HCPCS | Performed by: SURGERY

## 2023-06-08 PROCEDURE — 3017F COLORECTAL CA SCREEN DOC REV: CPT | Performed by: SURGERY

## 2023-06-08 RX ORDER — TOPIRAMATE 50 MG/1
50 TABLET, FILM COATED ORAL 2 TIMES DAILY
Qty: 60 TABLET | Refills: 3 | Status: SHIPPED | OUTPATIENT
Start: 2023-06-08

## 2023-06-08 NOTE — PATIENT INSTRUCTIONS
Make the following appointments:    Mario Garcia. Psychologist, Dr. Matt Sequeira. Schedule screening upper endoscopy with Dr. Fariha Hernandez. No aspirin, ibuprofen or other non-steroidal anti-inflammatory drugs (NSAIDs) 7 days prior to surgery and endoscopy    No food or drink six hours prior to surgery. Sleep Medicine referral for Sleep Apnea assessment and treatment. THE BIG FOUR RULES:  1. Count calories! People count calories eat less. Use the daily plate or other apps for your smart phone or computer. The more you count the more of an expert you will become and will get easier and easier. If you are trying to lose weight and exercising a lot, keep calories to around the thousand to 1200 a day. If you are not exercising consistently try to limit them to around 800 a day. 2.  Separate liquids and solids. Wait 30 minutes to an hour after you eat before drinking liquids. This will reduce the total amount of food you eat in the meal.    3.  Exercise! You need up to 5 hours a week with a combination of cardio and resistance or weight training in order to keep excess body fat off.    4.  Sleep! Try to get 7 or more hours of sleep a night. If you have obstructive sleep apnea definitely use your CPAP machine. THE RULE OF 20'S:  For every meal, chew each bite 20 seconds. Then put the fork down and wait 20 seconds after you swallow before picking up the fork again. Each meal should take at least 20 minutes so your brain can register that you are full. Check out \"The Big Book of The Gastric Bypass\" on QuickBl[employer] or Nettle.

## 2023-06-08 NOTE — PROGRESS NOTES
in perforation, stricture, and/or bleeding. All patients of child bearing age and/or ability are advised to avoid pregnancy for at least 2 years after Bariatric surgery. Patients with PCOS are advised that oral contraceptives are often not effective in the first 2 years after Bariatric surgery. We discussed that NSAIDs are contraindicated with gastric bypass anatomy due to risk of marginal ulcer formation and perforation. This does not include baby aspirin (81 mg a day). We will follow-up with referrals to our bariatric dietitian and to our bariatric psychologist.  We will need a preoperative history and physical within 30 days of surgery. We will also refer to Sleep Medicine to determine if there is sleep apnea and to begin treatment if present. More than 60 minutes were spent in face-to-face interaction with patient majority of which were spent in care coordination of the above.   Mariela Grajeda   has expressed interest in laparoscopic or robotic Mami en Y gastric bypass       Zev Jackman MD, HARI, FACS, [facility] Verified Surgeon  LCDR-USN-RET, Diplomate of The American Board of Surgery

## 2023-06-09 LAB — VITAMIN B-12: 194 PG/ML (ref 232–1245)

## 2023-06-09 RX ORDER — OXYBUTYNIN CHLORIDE 5 MG/1
5 TABLET, EXTENDED RELEASE ORAL DAILY
Qty: 90 TABLET | Refills: 2 | Status: SHIPPED | OUTPATIENT
Start: 2023-06-09

## 2023-06-14 LAB — VIT B1 SERPL-MCNC: 19 NMOL/L (ref 4–15)

## 2023-06-21 ENCOUNTER — TELEMEDICINE (OUTPATIENT)
Dept: PULMONOLOGY | Age: 68
End: 2023-06-21
Payer: COMMERCIAL

## 2023-06-21 DIAGNOSIS — G47.30 SLEEP DISORDER BREATHING: ICD-10-CM

## 2023-06-21 DIAGNOSIS — J45.20 MILD INTERMITTENT ASTHMA WITHOUT COMPLICATION: ICD-10-CM

## 2023-06-21 DIAGNOSIS — E66.9 OBESITY, UNSPECIFIED CLASSIFICATION, UNSPECIFIED OBESITY TYPE, UNSPECIFIED WHETHER SERIOUS COMORBIDITY PRESENT: Primary | ICD-10-CM

## 2023-06-21 PROCEDURE — 99203 OFFICE O/P NEW LOW 30 MIN: CPT | Performed by: INTERNAL MEDICINE

## 2023-06-21 NOTE — PROGRESS NOTES
Erika Iglesias (:  1955) is a New patient, presenting virtually for evaluation of the following:    Assessment & Plan   Below is the assessment and plan developed based on review of pertinent history, physical exam, labs, studies, and medications. 1. Obesity, unspecified classification, unspecified obesity type, unspecified whether serious comorbidity present  -     Home Sleep Study; Future  2. Sleep disorder breathing  -     Home Sleep Study; Future  3. Mild intermittent asthma without complication      Recommendations:    - Has risks of sleep disordered breathing. Will proceed with home sleep study. Explained to her the technicalities of sleep study. - Weight loss and exercise has been advised  - Can continue MAGDA as needed. Asthma seems very mild    Return in about 5 weeks (around 2023). Subjective  Bariatric clearance. HPI    78 female who is in the process of going to weight loss. This is a virtual visit for evaluation for sleep disordered breathing and assessing respiratory risks. She snores and has been told that she stops breathing at night. She wakes up sometimes gasping for air. She is tired and sleepy during the day. She has been told that she has asthma in the past.  She has albuterol rescue inhaler that she uses very infrequently. Has not been hospitalized for asthma. Uses rescue inhaler more with weather changes.     Review of Systems       12 points review of systems has been obtained and negative in     Past Medical History:   Diagnosis Date    Acne     Asthma     Deep vein thrombosis (DVT) (HCC)     right leg    Herniated disc     L4-L5    Hip pain, left     Hyperlipidemia     Rash and nonspecific skin eruption     TMJ (temporomandibular joint disorder)        Past Surgical History:   Procedure Laterality Date    BUNIONECTOMY Right 2019    CHOLECYSTECTOMY, LAPAROSCOPIC N/A 2021    LAPAROSCOPIC CHOLECYSTECTOMY  WITH CHOLANGIOGRAMS performed by Jelena Ingram MD

## 2023-06-23 ENCOUNTER — NURSE ONLY (OUTPATIENT)
Dept: BARIATRICS/WEIGHT MGMT | Age: 68
End: 2023-06-23

## 2023-06-23 VITALS — HEIGHT: 63 IN | BODY MASS INDEX: 40.4 KG/M2 | WEIGHT: 228 LBS

## 2023-06-23 NOTE — PROGRESS NOTES
[facility]  Initial Nutrition Consultation    Patient is a 78 y.o. female seen for initial Medical Nutrition Therapy visit for  Mami en Y gastric bypass. Visit number:  1 out of 3    6/23/2023  Height:   Ht Readings from Last 1 Encounters:   06/08/23 5' 2.5\" (1.588 m)     Weight:   Wt Readings from Last 1 Encounters:   06/08/23 234 lb 3.2 oz (106.2 kg)     BMI:   BMI Readings from Last 1 Encounters:   06/08/23 42.15 kg/m²     EBW: 228 lbs  Preoperative weight loss goal: 22 lbs (10%)    Weight History: Wt Readings from Last 3 Encounters:   06/08/23 234 lb 3.2 oz (106.2 kg)   05/30/23 228 lb (103.4 kg)   01/30/23 214 lb (97.1 kg)       Patient's lowest adult weight was 103 lbs at age 26. Patient's highest adult weight was 257 lbs at age 60. Patient has participated in the following weight loss programs Weight Watchers, self directed low calorie diet, Atkins, and exercise  Patient has participated in meal replacement/liquid diets. Patient has not participated in weight loss medications. Patient is not lactose intolerant. Patient does not have Restoration/cultural food concerns. Patient does not have food allergies. Patient dines out to a sit down restaurant 4 times per week. Patient has fast food or picks up carry out 1 times per week. Grocery Shopping in household done by: patient  Cooking in household done by: patient    24 hour recall/food frequency chart:  Breakfast:  1-2 servings fruit  Snack:  none  Lunch: 2 slices turkey with cheese, crackers  Snack:  carb smart ice cream  Dinner: dine out often, takes half of meal home  Snack:  SF pudding, cheese stick  Drinks throughout the day: Patient has decreased diet pop from 100oz/day to ~60 oz/day. Trying to drink more unsweetened tea (decaf) and Crystal Light  Vitamin/Mineral supplementation: started taking B12 d/t recent low B12 result    Do you drink alcohol? No.     Do you smoke?  No    Patient does

## 2023-07-05 ENCOUNTER — HOSPITAL ENCOUNTER (OUTPATIENT)
Dept: SLEEP CENTER | Age: 68
Discharge: HOME | End: 2023-07-07
Payer: COMMERCIAL

## 2023-07-05 DIAGNOSIS — G47.30 SLEEP DISORDER BREATHING: ICD-10-CM

## 2023-07-05 DIAGNOSIS — E66.9 OBESITY, UNSPECIFIED CLASSIFICATION, UNSPECIFIED OBESITY TYPE, UNSPECIFIED WHETHER SERIOUS COMORBIDITY PRESENT: ICD-10-CM

## 2023-07-05 PROCEDURE — 95806 SLEEP STUDY UNATT&RESP EFFT: CPT

## 2023-07-14 DIAGNOSIS — E78.00 PURE HYPERCHOLESTEROLEMIA: ICD-10-CM

## 2023-07-14 RX ORDER — ROSUVASTATIN CALCIUM 5 MG/1
TABLET, COATED ORAL
Qty: 90 TABLET | Refills: 3 | Status: SHIPPED | OUTPATIENT
Start: 2023-07-14

## 2023-07-14 NOTE — TELEPHONE ENCOUNTER
Patient is requesting medication refill.  Please approve or deny this request.    Rx requested:  Requested Prescriptions     Pending Prescriptions Disp Refills    rosuvastatin (CRESTOR) 5 MG tablet [Pharmacy Med Name: ROSUVASTATIN TABS 5MG] 90 tablet 3     Sig: TAKE 1 TABLET DAILY         Last Office Visit:   5/30/2023      Next Visit Date:  Future Appointments   Date Time Provider [address]   7/24/2023 11:30 AM [facility]   8/28/2023 10:00 AM JUNITO Arreguin   8/28/2023 11:30 AM [facility]   8/31/2023 10:00 AM Milagros Huang, [address] [place] [address]

## 2023-07-24 ENCOUNTER — OFFICE VISIT (OUTPATIENT)
Dept: BARIATRICS/WEIGHT MGMT | Age: 68
End: 2023-07-24
Payer: COMMERCIAL

## 2023-07-24 VITALS — BODY MASS INDEX: 41.04 KG/M2 | HEIGHT: 63 IN

## 2023-07-24 DIAGNOSIS — E11.69 DIABETES MELLITUS TYPE 2 IN OBESE (HCC): Primary | ICD-10-CM

## 2023-07-24 DIAGNOSIS — E66.01 MORBID OBESITY (HCC): ICD-10-CM

## 2023-07-24 DIAGNOSIS — E66.9 DIABETES MELLITUS TYPE 2 IN OBESE (HCC): Primary | ICD-10-CM

## 2023-07-24 PROCEDURE — 3044F HG A1C LEVEL LT 7.0%: CPT | Performed by: DIETITIAN, REGISTERED

## 2023-07-24 PROCEDURE — 97803 MED NUTRITION INDIV SUBSEQ: CPT | Performed by: DIETITIAN, REGISTERED

## 2023-07-24 NOTE — PROGRESS NOTES
Nutrition follow up prior to surgery  Visit number:  2 out of 3 for Mami en Y gastric bypass. Initial Weight: 228 lbs    Wt Readings from Last 3 Encounters:   06/23/23 228 lb (103.4 kg)   06/08/23 234 lb 3.2 oz (106.2 kg)   05/30/23 228 lb (103.4 kg)     lost 10 lbs over 1 month, 18 lbs from start of program.    Previous nutrition goals: Add protein source to breakfast  Continue to decrease diet pop  Add Bariatric MVI now d/t current low B12, add Calcium citrate  Sample protein shakes  Read \"Patient Guide to Bariatric Surgery\" before next visit      Nutrition goals: met, keeping food journal 1200-1300kcal,/day, 70-100gr protein/day, has had only 2 diet pops over the past month. Continues to swim 3-4 times/week. Patient is reporting constipation. Taking bariatric MVI and Calcium. Patient would like to continue to follow up at final clearance next month. PES Statement:  Overweight/Obesity related to a complex combination of decreased energy needs, disordered eating patterns, physical inactivity, and increased psychological/life stress as evidenced by BMI greater than 30 and inability to maintain a significant amount of weight loss through conventional weight loss interventions. Intervention:  Reviewed previous goals and set new goals. Stressed importance of preoperative weight loss. Provided continued education regarding diet and lifestyle changes for optimal success post bariatric surgery. Encouragement and support provided. Education materials provided: none    Plan/Recommendations:   Protein max 90 gr/day to lessen constipation  2. Add Miralax daily  3.   Continue food journal    Follow up: 4 weeks     Total time involved in direct patient education: 30 minutes    Chelo Andrews RD

## 2023-08-15 ENCOUNTER — TELEMEDICINE (OUTPATIENT)
Dept: PULMONOLOGY | Age: 68
End: 2023-08-15
Payer: COMMERCIAL

## 2023-08-15 DIAGNOSIS — G47.33 OSA (OBSTRUCTIVE SLEEP APNEA): Primary | ICD-10-CM

## 2023-08-15 DIAGNOSIS — E66.9 OBESITY, UNSPECIFIED CLASSIFICATION, UNSPECIFIED OBESITY TYPE, UNSPECIFIED WHETHER SERIOUS COMORBIDITY PRESENT: ICD-10-CM

## 2023-08-15 DIAGNOSIS — Z01.818 PREOPERATIVE CLEARANCE: ICD-10-CM

## 2023-08-15 PROCEDURE — 99213 OFFICE O/P EST LOW 20 MIN: CPT | Performed by: INTERNAL MEDICINE

## 2023-08-15 NOTE — PROGRESS NOTES
Cristy Berkowitz (:  1955) is a Established patient, presenting virtually for evaluation of the following:    Assessment & Plan   Below is the assessment and plan developed based on review of pertinent history, physical exam, labs, studies, and medications. 1. RUBIO (obstructive sleep apnea)  2. Obesity, unspecified classification, unspecified obesity type, unspecified whether serious comorbidity present  3. Preoperative clearance      Recommendations:    - I discussed the results of sleep study with the patient in details and answered all questions.  -Weight loss would be helpful to eliminate respiratory events. She can proceed with weight loss surgery. Reassess with a home sleep study in 6 months.  -She is at mild risk for postoperative respiratory complications. She can proceed with surgery. Return in about 31 weeks (around 3/19/2024). Subjective  Sleep study results. HPI      78 female who is in the process of going to weight loss. This is a virtual visit for evaluation for sleep disordered breathing and assessing respiratory risks. She snores and has been told that she stops breathing at night. She wakes up sometimes gasping for air. She is tired and sleepy during the day. She has been told that she has asthma in the past.  She has albuterol rescue inhaler that she uses very infrequently. Has not been hospitalized for asthma. Uses rescue inhaler more with weather changes. 8/15/23:    Had a home sleep study. Study showed mild obstructive sleep apnea with an overall AHI of 15. There is no significant desaturations. There is no change in her symptoms. She is trying to lose weight with dieting. Review of Systems     12 point review of systems obtained and negative except was mentioned in the HPI.       Past Medical History:   Diagnosis Date    Acne     Asthma     Deep vein thrombosis (DVT) (HCC)     right leg    Herniated disc     L4-L5    Hip pain, left     Hyperlipidemia

## 2023-08-18 ENCOUNTER — PREP FOR PROCEDURE (OUTPATIENT)
Dept: GASTROENTEROLOGY | Age: 68
End: 2023-08-18

## 2023-08-22 ENCOUNTER — ANESTHESIA EVENT (OUTPATIENT)
Dept: ENDOSCOPY | Age: 68
End: 2023-08-22
Payer: COMMERCIAL

## 2023-08-22 NOTE — ANESTHESIA PRE PROCEDURE
Cuco Pearl in the last 72 hours. Coags: No results found for: PROTIME, INR, APTT    HCG (If Applicable): No results found for: PREGTESTUR, PREGSERUM, HCG, HCGQUANT     ABGs: No results found for: PHART, PO2ART, WYH9NRK, LQO4DFW, BEART, H4SMRDWN     Type & Screen (If Applicable):  No results found for: LABABO, LABRH    Drug/Infectious Status (If Applicable):  No results found for: HIV, HEPCAB    COVID-19 Screening (If Applicable):   Lab Results   Component Value Date/Time    COVID19 Not Detected 04/14/2021 03:10 PM           Anesthesia Evaluation  Patient summary reviewed and Nursing notes reviewed no history of anesthetic complications:   Airway: Mallampati: II  TM distance: >3 FB   Neck ROM: full  Mouth opening: > = 3 FB   Dental: normal exam         Pulmonary:normal exam    (+) asthma:                            Cardiovascular:Negative CV ROS  Exercise tolerance: good (>4 METS),         ECG reviewed               Beta Blocker:  Not on Beta Blocker         Neuro/Psych:   (+) psychiatric history:            GI/Hepatic/Renal:   (+) GERD:, morbid obesity          Endo/Other:    (+) Diabetes, . Pt had PAT visit. Abdominal:             Vascular: negative vascular ROS. Other Findings:           Anesthesia Plan      MAC     ASA 3       Induction: intravenous.                             VANESA Roberson - CRNA   3/89/1529

## 2023-08-23 ENCOUNTER — HOSPITAL ENCOUNTER (OUTPATIENT)
Age: 68
Setting detail: OUTPATIENT SURGERY
Discharge: HOME | End: 2023-08-23
Attending: SURGERY | Admitting: SURGERY
Payer: COMMERCIAL

## 2023-08-23 ENCOUNTER — ANESTHESIA (OUTPATIENT)
Dept: ENDOSCOPY | Age: 68
End: 2023-08-23
Payer: COMMERCIAL

## 2023-08-23 VITALS
RESPIRATION RATE: 16 BRPM | TEMPERATURE: 96.9 F | HEART RATE: 59 BPM | BODY MASS INDEX: 36.68 KG/M2 | OXYGEN SATURATION: 96 % | DIASTOLIC BLOOD PRESSURE: 54 MMHG | WEIGHT: 207 LBS | HEIGHT: 63 IN | SYSTOLIC BLOOD PRESSURE: 98 MMHG

## 2023-08-23 DIAGNOSIS — E66.9 DIABETES MELLITUS TYPE 2 IN OBESE (HCC): ICD-10-CM

## 2023-08-23 DIAGNOSIS — E66.01 MORBID OBESITY (HCC): ICD-10-CM

## 2023-08-23 DIAGNOSIS — E11.69 DIABETES MELLITUS TYPE 2 IN OBESE (HCC): ICD-10-CM

## 2023-08-23 PROCEDURE — 88305 TISSUE EXAM BY PATHOLOGIST: CPT

## 2023-08-23 PROCEDURE — 6370000000 HC RX 637 (ALT 250 FOR IP): Performed by: SURGERY

## 2023-08-23 PROCEDURE — 2580000003 HC RX 258: Performed by: SURGERY

## 2023-08-23 PROCEDURE — 6360000002 HC RX W HCPCS: Performed by: REGISTERED NURSE

## 2023-08-23 PROCEDURE — NBSRV NON-BILLABLE SERVICE: Performed by: SURGERY

## 2023-08-23 PROCEDURE — 43239 EGD BIOPSY SINGLE/MULTIPLE: CPT | Performed by: SURGERY

## 2023-08-23 PROCEDURE — 2580000003 HC RX 258

## 2023-08-23 PROCEDURE — 2709999900 HC NON-CHARGEABLE SUPPLY: Performed by: SURGERY

## 2023-08-23 PROCEDURE — 2500000003 HC RX 250 WO HCPCS: Performed by: REGISTERED NURSE

## 2023-08-23 PROCEDURE — 7100000010 HC PHASE II RECOVERY - FIRST 15 MIN: Performed by: SURGERY

## 2023-08-23 PROCEDURE — 7100000011 HC PHASE II RECOVERY - ADDTL 15 MIN: Performed by: SURGERY

## 2023-08-23 PROCEDURE — 3609012400 HC EGD TRANSORAL BIOPSY SINGLE/MULTIPLE: Performed by: SURGERY

## 2023-08-23 PROCEDURE — 3700000000 HC ANESTHESIA ATTENDED CARE: Performed by: SURGERY

## 2023-08-23 PROCEDURE — 88342 IMHCHEM/IMCYTCHM 1ST ANTB: CPT

## 2023-08-23 RX ORDER — SIMETHICONE 20 MG/.3ML
EMULSION ORAL PRN
Status: DISCONTINUED | OUTPATIENT
Start: 2023-08-23 | End: 2023-08-23 | Stop reason: ALTCHOICE

## 2023-08-23 RX ORDER — SODIUM CHLORIDE 9 MG/ML
INJECTION, SOLUTION INTRAVENOUS
Status: COMPLETED
Start: 2023-08-23 | End: 2023-08-23

## 2023-08-23 RX ORDER — PROPOFOL 10 MG/ML
INJECTION, EMULSION INTRAVENOUS PRN
Status: DISCONTINUED | OUTPATIENT
Start: 2023-08-23 | End: 2023-08-23 | Stop reason: SDUPTHER

## 2023-08-23 RX ORDER — SODIUM CHLORIDE 9 MG/ML
INJECTION, SOLUTION INTRAVENOUS CONTINUOUS
Status: CANCELLED | OUTPATIENT
Start: 2023-08-23

## 2023-08-23 RX ORDER — MAGNESIUM HYDROXIDE 1200 MG/15ML
LIQUID ORAL PRN
Status: DISCONTINUED | OUTPATIENT
Start: 2023-08-23 | End: 2023-08-23 | Stop reason: ALTCHOICE

## 2023-08-23 RX ORDER — SODIUM CHLORIDE 9 MG/ML
INJECTION, SOLUTION INTRAVENOUS CONTINUOUS
Status: DISCONTINUED | OUTPATIENT
Start: 2023-08-23 | End: 2023-08-23 | Stop reason: HOSPADM

## 2023-08-23 RX ORDER — LIDOCAINE HYDROCHLORIDE 20 MG/ML
INJECTION, SOLUTION INFILTRATION; PERINEURAL PRN
Status: DISCONTINUED | OUTPATIENT
Start: 2023-08-23 | End: 2023-08-23 | Stop reason: SDUPTHER

## 2023-08-23 RX ORDER — SODIUM CHLORIDE 0.9 % (FLUSH) 0.9 %
5-40 SYRINGE (ML) INJECTION EVERY 12 HOURS SCHEDULED
Status: CANCELLED | OUTPATIENT
Start: 2023-08-23

## 2023-08-23 RX ORDER — SODIUM CHLORIDE 9 MG/ML
INJECTION, SOLUTION INTRAVENOUS PRN
Status: DISCONTINUED | OUTPATIENT
Start: 2023-08-23 | End: 2023-08-23 | Stop reason: HOSPADM

## 2023-08-23 RX ORDER — GLYCOPYRROLATE 1 MG/5 ML
SYRINGE (ML) INTRAVENOUS PRN
Status: DISCONTINUED | OUTPATIENT
Start: 2023-08-23 | End: 2023-08-23 | Stop reason: SDUPTHER

## 2023-08-23 RX ORDER — SODIUM CHLORIDE 0.9 % (FLUSH) 0.9 %
5-40 SYRINGE (ML) INJECTION EVERY 12 HOURS SCHEDULED
Status: DISCONTINUED | OUTPATIENT
Start: 2023-08-23 | End: 2023-08-23 | Stop reason: HOSPADM

## 2023-08-23 RX ORDER — SODIUM CHLORIDE 9 MG/ML
INJECTION, SOLUTION INTRAVENOUS PRN
Status: CANCELLED | OUTPATIENT
Start: 2023-08-23

## 2023-08-23 RX ADMIN — SODIUM CHLORIDE: 9 INJECTION, SOLUTION INTRAVENOUS at 10:18

## 2023-08-23 RX ADMIN — Medication 0.2 MG: at 09:57

## 2023-08-23 RX ADMIN — PROPOFOL 150 MG: 10 INJECTION, EMULSION INTRAVENOUS at 10:29

## 2023-08-23 RX ADMIN — PROPOFOL 50 MG: 10 INJECTION, EMULSION INTRAVENOUS at 10:32

## 2023-08-23 RX ADMIN — PROPOFOL 50 MG: 10 INJECTION, EMULSION INTRAVENOUS at 10:35

## 2023-08-23 RX ADMIN — LIDOCAINE HYDROCHLORIDE 100 MG: 20 INJECTION, SOLUTION INFILTRATION; PERINEURAL at 09:57

## 2023-08-23 RX ADMIN — PROPOFOL 50 MG: 10 INJECTION, EMULSION INTRAVENOUS at 10:30

## 2023-08-23 ASSESSMENT — PAIN - FUNCTIONAL ASSESSMENT: PAIN_FUNCTIONAL_ASSESSMENT: 0-10

## 2023-08-23 NOTE — H&P
H&P     Patient Name:  :  Hospital Day:   Efraín Browning 1955 [unfilled]      Date of Service: 2023     Subjective:   History of Present Illness:    Efraín Browning  is a 78 y.o. female referred by  Darrin Huang PA-C    for morbid obesity and diabetes. Efraín Browning reports multiple episodes of weight loss and regain after multiple diet and exercise programs. Fidelia Gerardo denies nicotine or any regular alcohol use. There has not been an evaluation for sleep apnea. She works out frequently at her gym. She is on metformin for diabetes. Fidelia Gerardo reports significant acid reflux symptoms.         Past Medical History        Past Medical History:   Diagnosis Date    Acne      Asthma      Deep vein thrombosis (DVT) (HCC)       right leg    Herniated disc       L4-L5    Hip pain, left      Hyperlipidemia      Rash and nonspecific skin eruption      TMJ (temporomandibular joint disorder)          Past Surgical History         Past Surgical History:   Procedure Laterality Date    BUNIONECTOMY Right 2019    CHOLECYSTECTOMY, LAPAROSCOPIC N/A 2021     LAPAROSCOPIC CHOLECYSTECTOMY  WITH CHOLANGIOGRAMS performed by Jazmyn Sanchez MD at [address] [place] [address] ([address] [place] [address])       THYROID SURGERY                Family History         Family History   Problem Relation Age of Onset    Heart Disease Mother      High Blood Pressure Father      Heart Disease Father          Social History            Tobacco Use    Smoking status: Former       Packs/day: 0.25       Years: 20.00       Pack years: 5.00       Types: Cigarettes       Quit date:        Years since quittin.4    Smokeless tobacco: Never   Substance Use Topics    Alcohol use: No    Drug use: No          Allergies: Augmentin [amoxicillin-pot clavulanate], Cinnamic acide, Balsam, Linalool, Methylisothiazolinone, and Other     Review of Systems  Review of Systems - History obtained from the patient  General ROS: negative for - fever, discussed that nicotine is contraindicated after bariatric surgery due to the risk of marginal ulceration which can result in perforation, stricture, and/or bleeding. All patients of child bearing age and/or ability are advised to avoid pregnancy for at least 2 years after Bariatric surgery. Patients with PCOS are advised that oral contraceptives are often not effective in the first 2 years after Bariatric surgery. We discussed that NSAIDs are contraindicated with gastric bypass anatomy due to risk of marginal ulcer formation and perforation. This does not include baby aspirin (81 mg a day). We will follow-up with referrals to our bariatric dietitian and to our bariatric psychologist.  We will need a preoperative history and physical within 30 days of surgery. We will also refer to Sleep Medicine to determine if there is sleep apnea and to begin treatment if present. More than 30 minutes were spent in face-to-face interaction with patient majority of which were spent in care coordination of the above.   Charisse Law   has expressed interest in laparoscopic or robotic Mami en Y gastric bypass         Adarsh Warren MD, SANJANAMBS, FACS, [facility] Verified Surgeon  LCDR-USN-RET, Diplomate of The American Board of Surgery

## 2023-08-23 NOTE — ANESTHESIA POSTPROCEDURE EVALUATION
Department of Anesthesiology  Postprocedure Note    Patient: Cristy Berkowitz  MRN: [id number]  YOB: 1955  Date of evaluation: 8/23/2023      Procedure Summary     Date: 08/23/23 Room / Location: [address] OR 03 / [address]    Anesthesia Start: 1017 Anesthesia Stop: 1041    Procedure: EGD ESOPHAGOGASTRODUODENOSCOPY WITH BIOPSIES Diagnosis:       Morbid obesity ([address])      Diabetes mellitus type 2 in obese ([address])      (Morbid obesity ([address]) [E66.01])      (Diabetes mellitus type 2 in obese ([address]) [E11.69, E66.9])    Surgeons: Cary Block MD Responsible Provider: VANESA Nayak CRNA    Anesthesia Type: MAC ASA Status: 3          Anesthesia Type: No value filed.     Yesi Phase I: Yesi Score: 10    Yesi Phase II:        Anesthesia Post Evaluation    Patient location during evaluation: bedside  Patient participation: complete - patient participated  Level of consciousness: awake and awake and alert  Airway patency: patent  Nausea & Vomiting: no nausea and no vomiting  Complications: no  Cardiovascular status: blood pressure returned to baseline and hemodynamically stable  Respiratory status: acceptable  Hydration status: euvolemic  Pain management: adequate

## 2023-08-23 NOTE — DISCHARGE INSTRUCTIONS
Upper GI Endoscopy: What to Expect at [address] [place] [address] [place] had an upper GI endoscopy. Your doctor used a thin, lighted tube that bends to look at the inside of your esophagus, your stomach, and the first part of the small intestine, called the duodenum. After you have an endoscopy, you will stay at the hospital or clinic for 1 to 2 hours. This will allow the medicine to wear off. You will be able to go home after your doctor or nurse checks to make sure that you're not having any problems. You may have to stay overnight if you had treatment during the test. You may have a sore throat for a day or two after the test.  This care sheet gives you a general idea about what to expect after the test.  How can you care for yourself at home? Activity   Rest as much as you need to after you go home. You should be able to go back to your usual activities the day after the test.  Diet   Follow your doctor's directions for eating after the test.  Drink plenty of fluids (unless your doctor has told you not to). Medications   If you have a sore throat the day after the test, use an over-the-counter spray to numb your throat. Follow-up care is a key part of your treatment and safety. Be sure to make and go to all appointments, and call your doctor if you are having problems. It's also a good idea to know your test results and keep a list of the medicines you take. When should you call for help? Call 911 anytime you think you may need emergency care. For example, call if:    You passed out (lost consciousness). You have trouble breathing. You pass maroon or bloody stools. Call your doctor now or seek immediate medical care if:    You have pain that does not get better after your take pain medicine. You have new or worse belly pain. You have blood in your stools. You are sick to your stomach and cannot keep fluids down. You have a fever. You cannot pass stools or gas.    Watch closely for a certain amount of time before your breath test or stomach biopsy. Follow your doctor's instructions about how long you need to avoid eating and drinking before the test. If you are going to have a stomach biopsy, your doctor will give you instructions on how to prepare. How is the test done? Blood antibody test  A health professional uses a needle to take a blood sample, usually from the arm. Urea breath test  A breath sample is collected when you blow into a balloon or blow bubbles into a bottle of liquid. The health professional will:  Collect a sample of your breath before the test starts. Give you a capsule or some water to swallow that contains tagged or radioactive material.  Collect more samples of your breath. The samples will be tested to see if they contain material formed when H. pylori comes into contact with the tagged or radioactive material.  Stool antigen test  For this test, you may be asked to collect the stool sample at home. To collect the sample, you need to:  Pass stool into a dry container. Either solid or liquid stools can be collected. Be careful not to get urine or toilet tissue in with the stool sample. Replace the container cap. Label the container with your name, your doctor's name, and the date the sample was collected. Wash your hands well after you collect the sample. Take the sealed container to your doctor's office or to the lab as soon as you can. Stomach biopsy  A procedure called endoscopy is used to collect samples of tissue from the stomach and the first part of the small intestine. The tissue samples are tested in a lab to see if they contain H. pylori. Follow-up care is a key part of your treatment and safety. Be sure to make and go to all appointments, and call your doctor if you are having problems. It's also a good idea to keep a list of the medicines you take. Ask your doctor when you can expect to have your test results. Where can you learn more?   Go to

## 2023-08-24 DIAGNOSIS — R10.11 RUQ PAIN: ICD-10-CM

## 2023-08-25 PROBLEM — E66.9 OBESITY: Status: ACTIVE | Noted: 2023-06-08

## 2023-08-25 RX ORDER — PANTOPRAZOLE SODIUM 40 MG/1
TABLET, DELAYED RELEASE ORAL
Qty: 90 TABLET | Refills: 3 | Status: SHIPPED | OUTPATIENT
Start: 2023-08-25

## 2023-08-25 NOTE — TELEPHONE ENCOUNTER
Comments: This request is coming from the pharmacy. Last Office Visit (last PCP visit):   5/30/2023    Next Visit Date:  Future Appointments   Date Time Provider [address]  [address]   8/28/2023 10:00 AM Horace Enriquez PSYD [facility]   8/28/2023 11:30 AM [facility]   8/29/2023 12:00 PM SCHEDULE, [facility] [place] [facility]   8/31/2023 10:00 AM ANGELA Kingsley [address] [place] [address]   3/14/2024 11:30 AM James Shi MD [facility]       If hasn't been seen in over a year OR hasn't followed up according to last diabetes/ADHD visit, make appointment for patient before sending refill to provider.     Rx requested:  Requested Prescriptions     Pending Prescriptions Disp Refills    pantoprazole (PROTONIX) 40 MG tablet [Pharmacy Med Name: PANTOPRAZOLE SODIUM DR TABS 40MG] 90 tablet 3     Sig: TAKE 1 TABLET EVERY MORNING BEFORE BREAKFAST

## 2023-08-28 ENCOUNTER — OFFICE VISIT (OUTPATIENT)
Dept: BARIATRICS/WEIGHT MGMT | Age: 68
End: 2023-08-28
Payer: COMMERCIAL

## 2023-08-28 ENCOUNTER — OFFICE VISIT (OUTPATIENT)
Dept: BARIATRICS/WEIGHT MGMT | Age: 68
End: 2023-08-28

## 2023-08-28 VITALS — HEIGHT: 63 IN | BODY MASS INDEX: 37.49 KG/M2 | WEIGHT: 211.6 LBS

## 2023-08-28 DIAGNOSIS — E11.69 DIABETES MELLITUS TYPE 2 IN OBESE (HCC): ICD-10-CM

## 2023-08-28 DIAGNOSIS — Z00.8 PRE-SURGICAL PSYCHOLOGICAL ASSESSMENT, ENCOUNTER FOR: ICD-10-CM

## 2023-08-28 DIAGNOSIS — J45.40 MODERATE PERSISTENT ASTHMA WITHOUT COMPLICATION: ICD-10-CM

## 2023-08-28 DIAGNOSIS — F50.89 OTHER DISORDER OF EATING: ICD-10-CM

## 2023-08-28 DIAGNOSIS — M25.551 CHRONIC RIGHT HIP PAIN: ICD-10-CM

## 2023-08-28 DIAGNOSIS — F32.A DEPRESSION, UNSPECIFIED DEPRESSION TYPE: ICD-10-CM

## 2023-08-28 DIAGNOSIS — M54.50 CHRONIC MIDLINE LOW BACK PAIN WITHOUT SCIATICA: ICD-10-CM

## 2023-08-28 DIAGNOSIS — E66.01 MORBID OBESITY (HCC): ICD-10-CM

## 2023-08-28 DIAGNOSIS — F41.9 ANXIETY DISORDER, UNSPECIFIED TYPE: ICD-10-CM

## 2023-08-28 DIAGNOSIS — G89.29 CHRONIC MIDLINE LOW BACK PAIN WITHOUT SCIATICA: ICD-10-CM

## 2023-08-28 DIAGNOSIS — G89.29 CHRONIC RIGHT HIP PAIN: ICD-10-CM

## 2023-08-28 DIAGNOSIS — E11.69 DIABETES MELLITUS TYPE 2 IN OBESE (HCC): Primary | ICD-10-CM

## 2023-08-28 DIAGNOSIS — K21.9 GASTROESOPHAGEAL REFLUX DISEASE, UNSPECIFIED WHETHER ESOPHAGITIS PRESENT: ICD-10-CM

## 2023-08-28 DIAGNOSIS — K22.70 BARRETT'S ESOPHAGUS DETERMINED BY BIOPSY: ICD-10-CM

## 2023-08-28 DIAGNOSIS — E66.9 DIABETES MELLITUS TYPE 2 IN OBESE (HCC): ICD-10-CM

## 2023-08-28 DIAGNOSIS — E66.9 DIABETES MELLITUS TYPE 2 IN OBESE (HCC): Primary | ICD-10-CM

## 2023-08-28 DIAGNOSIS — Z71.89 ENCOUNTER FOR PSYCHOLOGICAL ASSESSMENT PRIOR TO BARIATRIC SURGERY: Primary | ICD-10-CM

## 2023-08-28 PROCEDURE — 3044F HG A1C LEVEL LT 7.0%: CPT | Performed by: DIETITIAN, REGISTERED

## 2023-08-28 PROCEDURE — 97803 MED NUTRITION INDIV SUBSEQ: CPT | Performed by: DIETITIAN, REGISTERED

## 2023-08-28 ASSESSMENT — PATIENT HEALTH QUESTIONNAIRE - PHQ9
SUM OF ALL RESPONSES TO PHQ QUESTIONS 1-9: 1
10. IF YOU CHECKED OFF ANY PROBLEMS, HOW DIFFICULT HAVE THESE PROBLEMS MADE IT FOR YOU TO DO YOUR WORK, TAKE CARE OF THINGS AT HOME, OR GET ALONG WITH OTHER PEOPLE: 0
8. MOVING OR SPEAKING SO SLOWLY THAT OTHER PEOPLE COULD HAVE NOTICED. OR THE OPPOSITE, BEING SO FIGETY OR RESTLESS THAT YOU HAVE BEEN MOVING AROUND A LOT MORE THAN USUAL: 0
2. FEELING DOWN, DEPRESSED OR HOPELESS: 0
SUM OF ALL RESPONSES TO PHQ QUESTIONS 1-9: 1
4. FEELING TIRED OR HAVING LITTLE ENERGY: 0
3. TROUBLE FALLING OR STAYING ASLEEP: 0
SUM OF ALL RESPONSES TO PHQ QUESTIONS 1-9: 1
SUM OF ALL RESPONSES TO PHQ QUESTIONS 1-9: 1
1. LITTLE INTEREST OR PLEASURE IN DOING THINGS: 0
SUM OF ALL RESPONSES TO PHQ9 QUESTIONS 1 & 2: 0
6. FEELING BAD ABOUT YOURSELF - OR THAT YOU ARE A FAILURE OR HAVE LET YOURSELF OR YOUR FAMILY DOWN: 0
9. THOUGHTS THAT YOU WOULD BE BETTER OFF DEAD, OR OF HURTING YOURSELF: 0
7. TROUBLE CONCENTRATING ON THINGS, SUCH AS READING THE NEWSPAPER OR WATCHING TELEVISION: 1
5. POOR APPETITE OR OVEREATING: 0

## 2023-08-28 ASSESSMENT — ANXIETY QUESTIONNAIRES
6. BECOMING EASILY ANNOYED OR IRRITABLE: 0
4. TROUBLE RELAXING: 0
2. NOT BEING ABLE TO STOP OR CONTROL WORRYING: 0
7. FEELING AFRAID AS IF SOMETHING AWFUL MIGHT HAPPEN: 0
5. BEING SO RESTLESS THAT IT IS HARD TO SIT STILL: 0
3. WORRYING TOO MUCH ABOUT DIFFERENT THINGS: 0
IF YOU CHECKED OFF ANY PROBLEMS ON THIS QUESTIONNAIRE, HOW DIFFICULT HAVE THESE PROBLEMS MADE IT FOR YOU TO DO YOUR WORK, TAKE CARE OF THINGS AT HOME, OR GET ALONG WITH OTHER PEOPLE: NOT DIFFICULT AT ALL
1. FEELING NERVOUS, ANXIOUS, OR ON EDGE: 0
GAD7 TOTAL SCORE: 0

## 2023-08-28 NOTE — PROGRESS NOTES
has found to be extremely helpful. Patient acknowledged drinking Diet Coke (through a straw) approximately 3 times per week. Patient has taste tested protein shakes and is currently using them infrequently, but noted she often has a protein bar as a snack. Patient is taking Bariatric MVI and calcium citrate daily. Anamaria Diez reported that she engages in a moderate amount of exercise, which includes \"going to the gym 4 times per week - water aerobics, strength training, and cardio classes. \"    Motivation, Awareness of Risks, and Compliance:  Anamaria Diez exhibited adequate awareness of the risks of bariatric surgery and the problems that can occur if they do not comply with post operative instructions; however, she believes the benefits will outweigh the potential risks, as she hopes to minimize or reverse the effects of diabetes, GERD, and chronic pain. Anamaria Diez was able to adequately describe the Gastric Bypass (Mami-en-Y) procedure. She reports realistic expectations for the procedure, as her overall goals include \"to help with this [place]'s esophagus, my insulin resistance, and just get my health under better control; to have better stamina and endurance, and to just feel better and live longer. \" She understands the need for permanent lifestyle change, including dietary modifications and a regular exercise program, and expressed willingness to implement the necessary changes. She expressed a commitment to comply with treatment recommendations through this office. She identified her mother-in-law, several friends, her , and her children as a good support system in her efforts to meet her weight management goals. Clinical Impression Summary:  Anamaria Diez is a 78 y.o. female referred for a pre-surgical psychological evaluation to identify psychosocial issues that may complicate outcomes and provide recommendations for how to improve these potential problems.  Overall, the patient presented as cooperative and motivated to

## 2023-08-28 NOTE — PROGRESS NOTES
Nutrition follow up prior to surgery  Visit number:  3 out of 3 for Mami en Y gastric bypass. Initial Weight: 234 lbs (6/8/23)    Wt Readings from Last 3 Encounters:   08/28/23 211 lb 9.6 oz (96 kg)   08/23/23 207 lb (93.9 kg)   06/23/23 228 lb (103.4 kg)     lost 11 lbs over 1 month, 27 lbs (10%) from initial    Previous nutrition goals:   Protein max 90 gr/day to lessen constipation  2. Add Miralax daily  3. Continue food journal     Nutrition goals: met, has added a probiotic daily. Patient has completed required nutrition appointments and is cleared for bariatric surgery from a nutrition perspective. Will continue to follow up with patient while awaiting surgery. PES Statement:  Overweight/Obesity related to a complex combination of decreased energy needs, disordered eating patterns, physical inactivity, and increased psychological/life stress as evidenced by BMI greater than 30 and inability to maintain a significant amount of weight loss through conventional weight loss interventions. Intervention:  Reviewed previous goals and set new goals. Stressed importance of preoperative weight loss. Provided continued education regarding diet and lifestyle changes for optimal success post bariatric surgery. Encouragement and support provided. Education materials provided:   none    Plan/Recommendations: 1. Continue weight loss efforts  2. Will continue to follow for support while awaiting surgery date    Follow up: 4 weeks     Total time involved in direct patient education: 15 minutes    Pia Syed RD

## 2023-08-29 ENCOUNTER — NURSE ONLY (OUTPATIENT)
Dept: BARIATRICS/WEIGHT MGMT | Age: 68
End: 2023-08-29

## 2023-08-29 ENCOUNTER — TELEPHONE (OUTPATIENT)
Dept: BARIATRICS/WEIGHT MGMT | Age: 68
End: 2023-08-29

## 2023-08-29 DIAGNOSIS — E66.01 MORBID OBESITY (HCC): Primary | ICD-10-CM

## 2023-08-29 NOTE — TELEPHONE ENCOUNTER
I called Dong Roth to discuss the pathology results. I left a message that we will discuss the following at her next appointment:    Boston's esophagus/intestinal metaplasia and the 40% increased risk for esophageal cancer. We will discuss the importance of stopping the GERD, which gastric bypass will do. We will also discuss the need for yearly surveillance of the Boston's with upper endoscopy and biopsies to rule out severe dysplasia or cancer.     Keri Rowley MD, FACS, [facility]

## 2023-08-31 ENCOUNTER — OFFICE VISIT (OUTPATIENT)
Dept: FAMILY MEDICINE CLINIC | Age: 68
End: 2023-08-31
Payer: COMMERCIAL

## 2023-08-31 VITALS
TEMPERATURE: 97.2 F | OXYGEN SATURATION: 98 % | HEART RATE: 83 BPM | DIASTOLIC BLOOD PRESSURE: 70 MMHG | RESPIRATION RATE: 16 BRPM | WEIGHT: 212.8 LBS | HEIGHT: 63 IN | BODY MASS INDEX: 37.7 KG/M2 | SYSTOLIC BLOOD PRESSURE: 110 MMHG

## 2023-08-31 DIAGNOSIS — J45.40 MODERATE PERSISTENT ASTHMA WITHOUT COMPLICATION: ICD-10-CM

## 2023-08-31 DIAGNOSIS — E53.8 B12 DEFICIENCY: ICD-10-CM

## 2023-08-31 DIAGNOSIS — K22.70 BARRETT'S ESOPHAGUS WITHOUT DYSPLASIA: ICD-10-CM

## 2023-08-31 DIAGNOSIS — N28.9 ABNORMAL RENAL FUNCTION: ICD-10-CM

## 2023-08-31 DIAGNOSIS — K59.03 DRUG-INDUCED CONSTIPATION: ICD-10-CM

## 2023-08-31 DIAGNOSIS — E11.9 TYPE 2 DIABETES MELLITUS WITHOUT COMPLICATION, WITHOUT LONG-TERM CURRENT USE OF INSULIN (HCC): Primary | ICD-10-CM

## 2023-08-31 DIAGNOSIS — D22.9 ATYPICAL MOLE: ICD-10-CM

## 2023-08-31 DIAGNOSIS — E11.9 TYPE 2 DIABETES MELLITUS WITHOUT COMPLICATION, WITHOUT LONG-TERM CURRENT USE OF INSULIN (HCC): ICD-10-CM

## 2023-08-31 PROBLEM — K22.710 BARRETT'S ESOPHAGUS WITH LOW GRADE DYSPLASIA: Status: ACTIVE | Noted: 2023-08-31

## 2023-08-31 PROBLEM — M79.669 CALF TENDERNESS: Status: RESOLVED | Noted: 2023-03-13 | Resolved: 2023-08-31

## 2023-08-31 LAB
ANION GAP SERPL CALCULATED.3IONS-SCNC: 10 MEQ/L (ref 9–15)
BUN SERPL-MCNC: 22 MG/DL (ref 8–23)
CALCIUM SERPL-MCNC: 10 MG/DL (ref 8.5–9.9)
CHLORIDE SERPL-SCNC: 105 MEQ/L (ref 95–107)
CO2 SERPL-SCNC: 27 MEQ/L (ref 20–31)
CREAT SERPL-MCNC: 0.96 MG/DL (ref 0.5–0.9)
GLUCOSE SERPL-MCNC: 89 MG/DL (ref 70–99)
HBA1C MFR BLD: 5.9 % (ref 4.8–5.9)
POTASSIUM SERPL-SCNC: 4.2 MEQ/L (ref 3.4–4.9)
SODIUM SERPL-SCNC: 142 MEQ/L (ref 135–144)

## 2023-08-31 PROCEDURE — G8427 DOCREV CUR MEDS BY ELIG CLIN: HCPCS | Performed by: PHYSICIAN ASSISTANT

## 2023-08-31 PROCEDURE — 2022F DILAT RTA XM EVC RTNOPTHY: CPT | Performed by: PHYSICIAN ASSISTANT

## 2023-08-31 PROCEDURE — 1036F TOBACCO NON-USER: CPT | Performed by: PHYSICIAN ASSISTANT

## 2023-08-31 PROCEDURE — G8417 CALC BMI ABV UP PARAM F/U: HCPCS | Performed by: PHYSICIAN ASSISTANT

## 2023-08-31 PROCEDURE — G8399 PT W/DXA RESULTS DOCUMENT: HCPCS | Performed by: PHYSICIAN ASSISTANT

## 2023-08-31 PROCEDURE — 1090F PRES/ABSN URINE INCON ASSESS: CPT | Performed by: PHYSICIAN ASSISTANT

## 2023-08-31 PROCEDURE — 3044F HG A1C LEVEL LT 7.0%: CPT | Performed by: PHYSICIAN ASSISTANT

## 2023-08-31 PROCEDURE — 99214 OFFICE O/P EST MOD 30 MIN: CPT | Performed by: PHYSICIAN ASSISTANT

## 2023-08-31 PROCEDURE — 1123F ACP DISCUSS/DSCN MKR DOCD: CPT | Performed by: PHYSICIAN ASSISTANT

## 2023-08-31 PROCEDURE — 3017F COLORECTAL CA SCREEN DOC REV: CPT | Performed by: PHYSICIAN ASSISTANT

## 2023-08-31 RX ORDER — DOCUSATE SODIUM 100 MG/1
100 CAPSULE, LIQUID FILLED ORAL 2 TIMES DAILY
Qty: 60 CAPSULE | Refills: 3 | Status: SHIPPED | OUTPATIENT
Start: 2023-08-31 | End: 2023-12-29

## 2023-08-31 RX ORDER — FLUTICASONE PROPIONATE AND SALMETEROL 100; 50 UG/1; UG/1
1 POWDER RESPIRATORY (INHALATION) EVERY 12 HOURS
Qty: 60 EACH | Refills: 4 | Status: SHIPPED | OUTPATIENT
Start: 2023-08-31

## 2023-08-31 ASSESSMENT — ENCOUNTER SYMPTOMS
ABDOMINAL DISTENTION: 0
CONSTIPATION: 0
BACK PAIN: 0
VOMITING: 0
COLOR CHANGE: 0
CHEST TIGHTNESS: 0
ABDOMINAL PAIN: 0
RECTAL PAIN: 0
WHEEZING: 0
SHORTNESS OF BREATH: 0
NAUSEA: 0
COUGH: 0
DIARRHEA: 0

## 2023-08-31 NOTE — PROGRESS NOTES
Exam  Vitals reviewed. Constitutional:       Appearance: Normal appearance. HENT:      Head: Normocephalic and atraumatic. Right Ear: External ear normal.      Left Ear: External ear normal.      Nose: Nose normal.      Mouth/Throat:      Mouth: Mucous membranes are moist.   Cardiovascular:      Rate and Rhythm: Normal rate and regular rhythm. Pulmonary:      Effort: Pulmonary effort is normal.   Musculoskeletal:         General: No swelling or tenderness. Skin:     General: Skin is warm and dry. Findings: Lesion (discolored, darkened macule of forehead) present. No rash. Neurological:      General: No focal deficit present. Mental Status: She is alert and oriented to person, place, and time. Psychiatric:         Mood and Affect: Mood normal.         Behavior: Behavior normal.         Thought Content: Thought content normal.         Judgment: Judgment normal.     Assessment & Plan    Diagnosis Orders   1. Type 2 diabetes mellitus without complication, without long-term current use of insulin ([facility])  Basic Metabolic Panel    Hemoglobin A1C      2. Boston's esophagus without dysplasia        3. Atypical mole        4. Drug-induced constipation  docusate sodium (COLACE) 100 MG capsule      5. B12 deficiency  Vitamin B12 & Folate      6. Moderate persistent asthma without complication        7. Abnormal renal function        Repeat labs. Return to office for mole excision.      Orders Placed This Encounter   Procedures    Basic Metabolic Panel     Standing Status:   Future     Number of Occurrences:   1     Standing Expiration Date:   8/30/2024    Hemoglobin A1C     Standing Status:   Future     Number of Occurrences:   1     Standing Expiration Date:   8/30/2024    Vitamin B12 & Folate     Standing Status:   Future     Number of Occurrences:   1     Standing Expiration Date:   8/30/2024     Orders Placed This Encounter   Medications    fluticasone-salmeterol (ADVAIR DISKUS) 100-50 MCG/ACT

## 2023-09-01 LAB
FOLATE: >20 NG/ML
VITAMIN B-12: 734 PG/ML (ref 232–1245)

## 2023-09-07 LAB — NONINV COLON CA DNA+OCC BLD SCRN STL QL: NEGATIVE

## 2023-09-12 ENCOUNTER — PROCEDURE VISIT (OUTPATIENT)
Dept: FAMILY MEDICINE CLINIC | Age: 68
End: 2023-09-12

## 2023-09-12 VITALS
OXYGEN SATURATION: 98 % | SYSTOLIC BLOOD PRESSURE: 110 MMHG | HEIGHT: 63 IN | WEIGHT: 213 LBS | HEART RATE: 83 BPM | RESPIRATION RATE: 16 BRPM | BODY MASS INDEX: 37.74 KG/M2 | DIASTOLIC BLOOD PRESSURE: 78 MMHG

## 2023-09-12 DIAGNOSIS — D22.9 ATYPICAL MOLE: ICD-10-CM

## 2023-09-12 DIAGNOSIS — D49.2 SKIN NEOPLASM: ICD-10-CM

## 2023-09-12 DIAGNOSIS — D22.9 ATYPICAL MOLE: Primary | ICD-10-CM

## 2023-09-12 RX ORDER — LIDOCAINE HYDROCHLORIDE AND EPINEPHRINE 10; 10 MG/ML; UG/ML
20 INJECTION, SOLUTION INFILTRATION; PERINEURAL ONCE
Status: SHIPPED | OUTPATIENT
Start: 2023-09-12

## 2023-09-12 NOTE — PROGRESS NOTES
Subjective  Wendi José, 78 y.o. female presents today with:  Chief Complaint   Patient presents with    Procedure     Lesion/ mole removal from forehead        HPI  In office today for atypical mole removal.  It has been present on forehead for several months. It is discolored and slightly irregular. Family history of melanoma (brother). Review of Systems   Constitutional:  Negative for activity change. Cardiovascular:  Negative for chest pain. Skin:         Atypical mole. Neurological:  Negative for dizziness. Hematological:  Does not bruise/bleed easily.      Past Medical History:   Diagnosis Date    Acne     Asthma     Deep vein thrombosis (DVT) (HCC)     right leg    Herniated disc     L4-L5    Hip pain, left     Hyperlipidemia     Kidney stones     Rash and nonspecific skin eruption     TMJ (temporomandibular joint disorder)      Past Surgical History:   Procedure Laterality Date    BUNIONECTOMY Right 2019     SECTION      CHOLECYSTECTOMY, LAPAROSCOPIC N/A 2021    LAPAROSCOPIC CHOLECYSTECTOMY  WITH CHOLANGIOGRAMS performed by Susannah Crandall MD at [address] [place] [address] ([address] [place] [address])      KIDNEY STONE SURGERY Left     THYROID SURGERY      UPPER GASTROINTESTINAL ENDOSCOPY N/A 2023    EGD ESOPHAGOGASTRODUODENOSCOPY WITH BIOPSIES performed by Xiomy Sanduh MD at [address] [place] [address] History    Marital status:      Spouse name: Not on file    Number of children: Not on file    Years of education: Not on file    Highest education level: Not on file   Occupational History    Not on file   Tobacco Use    Smoking status: Former     Packs/day: 0.25     Years: 20.00     Additional pack years: 0.00     Total pack years: 5.00     Types: Cigarettes     Quit date: 0     Years since quittin.7    Smokeless tobacco: Never   Vaping Use    Vaping Use: Never used   Substance and Sexual Activity    Alcohol use:

## 2023-09-14 DIAGNOSIS — E55.9 VITAMIN D DEFICIENCY: ICD-10-CM

## 2023-09-15 NOTE — TELEPHONE ENCOUNTER
Comments:     Last Office Visit (last PCP visit):   9/12/2023    Next Visit Date:  Future Appointments   Date Time Provider [address]00  [address]   9/27/2023 11:00 AM [facility]   3/14/2024 11:30 AM Darlin Lubin MD [facility]       **If hasn't been seen in over a year OR hasn't followed up according to last diabetes/ADHD visit, make appointment for patient before sending refill to provider.     Rx requested:  Requested Prescriptions     Pending Prescriptions Disp Refills    D 5000 125 MCG (5000 UT) CAPS capsule [Pharmacy Med Name: VIT D-3 CAPS (CHOLECALCIFER) 5,000U] 90 capsule 3     Sig: TAKE 1 CAPSULE DAILY FOR VITAMIN D DEFICIENCY

## 2023-09-16 RX ORDER — DM/P-EPHED/ACETAMINOPH/DOXYLAM
LIQUID (ML) ORAL
Qty: 90 CAPSULE | Refills: 3 | Status: SHIPPED | OUTPATIENT
Start: 2023-09-16

## 2023-09-18 ENCOUNTER — PREP FOR PROCEDURE (OUTPATIENT)
Dept: BARIATRICS/WEIGHT MGMT | Age: 68
End: 2023-09-18

## 2023-09-18 PROBLEM — K21.9 GASTROESOPHAGEAL REFLUX DISEASE: Status: ACTIVE | Noted: 2023-09-18

## 2023-09-18 NOTE — TELEPHONE ENCOUNTER
Patient requesting medication refill.  Please approve or deny this request.    Rx requested:  Requested Prescriptions     Pending Prescriptions Disp Refills    topiramate (TOPAMAX) 50 MG tablet 60 tablet 1     Sig: Take 1 tablet by mouth 2 times daily         Last Office Visit:   6/8/2023      Next Visit Date:  Future Appointments   Date Time Provider [address]   10/23/2023  9:30 AM [facility]   10/25/2023  2:00 PM SCHEDULE, [facility] [employer] [facility]   10/27/2023  9:30 AM Ellie Garcia MD [facility]   11/15/2023  9:30 AM Ellie Garcia MD [facility]   11/15/2023 10:00 AM Jesusita Freed RD [facility]   12/7/2023  9:00 AM Ellie Garcia MD [facility]   12/7/2023  9:30 AM [facility]   12/7/2023 10:00 AM JUNITO Reardon   3/14/2024 11:30 AM Vanita Weller MD [facility]

## 2023-09-20 RX ORDER — TOPIRAMATE 50 MG/1
50 TABLET, FILM COATED ORAL 2 TIMES DAILY
Qty: 60 TABLET | Refills: 1 | Status: SHIPPED | OUTPATIENT
Start: 2023-10-03

## 2023-10-23 ENCOUNTER — OFFICE VISIT (OUTPATIENT)
Dept: BARIATRICS/WEIGHT MGMT | Age: 68
End: 2023-10-23
Payer: COMMERCIAL

## 2023-10-23 VITALS — BODY MASS INDEX: 36.29 KG/M2 | WEIGHT: 204.8 LBS | HEIGHT: 63 IN

## 2023-10-23 DIAGNOSIS — E66.01 MORBID OBESITY DUE TO EXCESS CALORIES (HCC): ICD-10-CM

## 2023-10-23 DIAGNOSIS — E11.69 DIABETES MELLITUS TYPE 2 IN OBESE (HCC): Primary | ICD-10-CM

## 2023-10-23 DIAGNOSIS — E66.9 DIABETES MELLITUS TYPE 2 IN OBESE (HCC): Primary | ICD-10-CM

## 2023-10-23 PROCEDURE — 3044F HG A1C LEVEL LT 7.0%: CPT | Performed by: DIETITIAN, REGISTERED

## 2023-10-23 PROCEDURE — 97803 MED NUTRITION INDIV SUBSEQ: CPT | Performed by: DIETITIAN, REGISTERED

## 2023-10-23 NOTE — PROGRESS NOTES
Nutrition follow up prior to surgery  Visit number:  4 out of 3 for Mami en Y gastric bypass. Initial Weight: 234 lbs (6/8/23)    Wt Readings from Last 3 Encounters:   09/12/23 96.6 kg (213 lb)   08/31/23 96.5 kg (212 lb 12.8 oz)   08/28/23 96 kg (211 lb 9.6 oz)     lost 2 lbs over 2 months, 29 lbs from initial    Previous nutrition goals: 1. Continue weight loss efforts  2. Will continue to follow for support while awaiting surgery date    Nutrition goals: met, scheduled for surgery 11/7/23    PES Statement:  Overweight/Obesity related to a complex combination of decreased energy needs, disordered eating patterns, physical inactivity, and increased psychological/life stress as evidenced by BMI greater than 30 and inability to maintain a significant amount of weight loss through conventional weight loss interventions. Intervention:  Reviewed previous goals and set new goals. Stressed importance of preoperative weight loss or surgery may be postponed or cancelled. Provided continued education regarding diet and lifestyle changes for optimal success post bariatric surgery. Encouragement and support provided.     Education materials provided:   Patient Guide to Bariatric Surgery    Plan/Recommendations:   Begin pre op diet tomorrow x 2 weeks  Sample CL protein shots/water to be used x 1 week post op    Follow up: 1 week post op    Total time involved in direct patient education: 30 minutes    Chelo Andrews RD

## 2023-10-25 ENCOUNTER — NURSE ONLY (OUTPATIENT)
Dept: BARIATRICS/WEIGHT MGMT | Age: 68
End: 2023-10-25

## 2023-10-25 DIAGNOSIS — E66.01 CLASS 2 SEVERE OBESITY WITH SERIOUS COMORBIDITY AND BODY MASS INDEX (BMI) OF 36.0 TO 36.9 IN ADULT, UNSPECIFIED OBESITY TYPE (HCC): Primary | ICD-10-CM

## 2023-10-25 NOTE — PROGRESS NOTES
Patient attended pre-op class on 10/25/2023. Reviewed with patient pre-op instructions, PAT appointment, and pre-op diet instructions. Reviewed pre-op diet and start date. Patient provided opportunity to ask questions. Patient verbalizes understanding of instructions. Handouts provided: Pre-op Class Power Point presentation,  Pre-Op Instructions,  Approved Pre-Packaged Meals, Protein Shots and Protein Water/Drinks, and Hydration Tracker. Patient instructed on post-op hydration goals, tracking, and provided complementary 32 oz water bottle to assist in tracking. Reviewed patient contract. Patient signed contract and agrees to follow program guidelines. Patient provided opportunity to ask questions. Patient Pre-Op Instructions: Follow these Instructions Carefully   Failure to comply with these instructions may result in a cancellation of your surgery. Start Liver Shrinking Diet:   If your BMI is <50, you will start the pre-op diet 2 weeks before surgery. If your BMI is ?50, you will start the pre-op diet 4 weeks before surgery. HOLD blood thinners 7 days prior to surgery (Coumadin, Eliquis, Plavix, Pradaxa, Xarelto or any other blood thinner). DO NOT take any aspirin or anti-inflammatory drugs (ex. Ibuprofen, Advil, Motrin, Aleve, naproxen) for 7 days prior to your procedure. HOLD GPL-1 medications (I.E. Ozempic, Trulicity, Byetta, Victoza, Rybelsus) 2 days before surgery. Start Day Before Surgery: Bariatric Clear Liquid diet and Bowel Prep (see page 2 for instructions). NO food or drink after midnight the night before surgery. The morning of procedure HOLD all medications except for beta blockers (I.E., metoprolol, sotol, carvedilol) and seizure medications. You may take these medications with a little sip of water, just enough to get the pills down. Please bring your Photo ID, Insurance Cards, and list of all current medications.     Please do not wear any jewelry such as

## 2023-10-27 ENCOUNTER — OFFICE VISIT (OUTPATIENT)
Dept: BARIATRICS/WEIGHT MGMT | Age: 68
End: 2023-10-27
Payer: COMMERCIAL

## 2023-10-27 VITALS
HEIGHT: 62 IN | OXYGEN SATURATION: 95 % | SYSTOLIC BLOOD PRESSURE: 110 MMHG | BODY MASS INDEX: 37.21 KG/M2 | WEIGHT: 202.2 LBS | HEART RATE: 52 BPM | DIASTOLIC BLOOD PRESSURE: 82 MMHG

## 2023-10-27 DIAGNOSIS — E66.01 MORBID OBESITY (HCC): Primary | ICD-10-CM

## 2023-10-27 PROCEDURE — 3017F COLORECTAL CA SCREEN DOC REV: CPT | Performed by: SURGERY

## 2023-10-27 PROCEDURE — 1090F PRES/ABSN URINE INCON ASSESS: CPT | Performed by: SURGERY

## 2023-10-27 PROCEDURE — G8484 FLU IMMUNIZE NO ADMIN: HCPCS | Performed by: SURGERY

## 2023-10-27 PROCEDURE — G8399 PT W/DXA RESULTS DOCUMENT: HCPCS | Performed by: SURGERY

## 2023-10-27 PROCEDURE — 1123F ACP DISCUSS/DSCN MKR DOCD: CPT | Performed by: SURGERY

## 2023-10-27 PROCEDURE — 1036F TOBACCO NON-USER: CPT | Performed by: SURGERY

## 2023-10-27 PROCEDURE — G8427 DOCREV CUR MEDS BY ELIG CLIN: HCPCS | Performed by: SURGERY

## 2023-10-27 PROCEDURE — G8417 CALC BMI ABV UP PARAM F/U: HCPCS | Performed by: SURGERY

## 2023-10-27 PROCEDURE — 99215 OFFICE O/P EST HI 40 MIN: CPT | Performed by: SURGERY

## 2023-10-27 RX ORDER — APREPITANT 125 MG/1
125 CAPSULE ORAL ONCE
Qty: 1 CAPSULE | Refills: 0 | Status: SHIPPED | OUTPATIENT
Start: 2023-10-27 | End: 2023-10-27

## 2023-10-27 RX ORDER — SODIUM CHLORIDE 0.9 % (FLUSH) 0.9 %
5-40 SYRINGE (ML) INJECTION PRN
OUTPATIENT
Start: 2023-10-27

## 2023-10-27 RX ORDER — SODIUM CHLORIDE, SODIUM LACTATE, POTASSIUM CHLORIDE, CALCIUM CHLORIDE 600; 310; 30; 20 MG/100ML; MG/100ML; MG/100ML; MG/100ML
INJECTION, SOLUTION INTRAVENOUS CONTINUOUS
OUTPATIENT
Start: 2023-10-27

## 2023-10-27 RX ORDER — SODIUM CHLORIDE 0.9 % (FLUSH) 0.9 %
5-40 SYRINGE (ML) INJECTION EVERY 12 HOURS SCHEDULED
OUTPATIENT
Start: 2023-10-27

## 2023-10-27 RX ORDER — HEPARIN SODIUM 5000 [USP'U]/ML
5000 INJECTION, SOLUTION INTRAVENOUS; SUBCUTANEOUS ONCE
OUTPATIENT
Start: 2023-10-27 | End: 2023-10-27

## 2023-10-27 RX ORDER — GABAPENTIN 100 MG/1
400 CAPSULE ORAL ONCE
OUTPATIENT
Start: 2023-10-27 | End: 2023-10-27

## 2023-10-27 RX ORDER — POLYETHYLENE GLYCOL 3350, SODIUM SULFATE ANHYDROUS, SODIUM BICARBONATE, SODIUM CHLORIDE, POTASSIUM CHLORIDE 236; 22.74; 6.74; 5.86; 2.97 G/4L; G/4L; G/4L; G/4L; G/4L
4000 POWDER, FOR SOLUTION ORAL ONCE
Qty: 4000 ML | Refills: 0 | Status: SHIPPED | OUTPATIENT
Start: 2023-10-27 | End: 2023-10-27

## 2023-10-27 RX ORDER — SODIUM CHLORIDE 9 MG/ML
INJECTION, SOLUTION INTRAVENOUS PRN
OUTPATIENT
Start: 2023-10-27

## 2023-10-27 RX ORDER — ONDANSETRON 2 MG/ML
4 INJECTION INTRAMUSCULAR; INTRAVENOUS ONCE
OUTPATIENT
Start: 2023-10-27 | End: 2023-10-27

## 2023-10-27 NOTE — H&P (VIEW-ONLY)
Surgery Consultation    Patient Name:  :  Hospital Day:   Charisse Law 1955 [unfilled]     Date of Service: 10/27/2023    Subjective:      History of Present Illness:   Charisse Law  is a 78 y.o. female referred by  Luis Huang PA-C    for morbid obesity and diabetes. Charisse Law reports multiple episodes of weight loss and regain after multiple diet and exercise programs. Elise Beaulieu denies nicotine or any regular alcohol use. She works out frequently at her gym. She is on metformin for diabetes. No RUBIO per pulmonary. Elise Beaulieu reports significant acid reflux symptoms.     Past Medical History:   Diagnosis Date    Acne     Asthma     Deep vein thrombosis (DVT) (HCC)     right leg    Herniated disc     L4-L5    Hip pain, left     Hyperlipidemia     Kidney stones     Rash and nonspecific skin eruption     TMJ (temporomandibular joint disorder)     Past Surgical History:   Procedure Laterality Date    BUNIONECTOMY Right 2019     SECTION      CHOLECYSTECTOMY, LAPAROSCOPIC N/A 2021    LAPAROSCOPIC CHOLECYSTECTOMY  WITH CHOLANGIOGRAMS performed by Erwin Avendaño MD at [address] [place] [address] ([address] [place] [address])      KIDNEY STONE SURGERY Left     THYROID SURGERY  1968    UPPER GASTROINTESTINAL ENDOSCOPY N/A 2023    EGD ESOPHAGOGASTRODUODENOSCOPY WITH BIOPSIES performed by Gissel Gomez MD at [facility]        Family History   Problem Relation Age of Onset    Heart Disease Mother     High Blood Pressure Father     Heart Disease Father     Colon Cancer Neg Hx     Social History     Tobacco Use    Smoking status: Former     Packs/day: 0.25     Years: 20.00     Additional pack years: 0.00     Total pack years: 5.00     Types: Cigarettes     Quit date:      Years since quittin.8    Smokeless tobacco: Never   Vaping Use    Vaping Use: Never used   Substance Use Topics    Alcohol use: No    Drug use: No        Allergies: Augmentin [amoxicillin-pot

## 2023-10-27 NOTE — PATIENT INSTRUCTIONS
No aspirin, ibuprofen or other non-steroidal anti-inflammatory drugs (NSAIDs) 7 days prior to surgery. No food or drink six hours prior to surgery. Take your Lexapro, Protonix, and Emend  the morning of surgery    Keep carbohydrates to 20 grams or less a day pre-op. The more weight you lose before surgery, the easier and safer your operation will be, and the easier your recovery will be. Complete the bowel prep the day before surgery.

## 2023-10-27 NOTE — PROGRESS NOTES
Surgery Consultation    Patient Name:  :  Hospital Day:   Warden Sevilla 1955 [unfilled]     Date of Service: 10/27/2023    Subjective:      History of Present Illness:   Warden Sevilla  is a 78 y.o. female referred by  Lori Huang PA-C    for morbid obesity and diabetes. Warden Sevilla reports multiple episodes of weight loss and regain after multiple diet and exercise programs. Cara Cullen denies nicotine or any regular alcohol use. She works out frequently at her gym. She is on metformin for diabetes. No RUBIO per pulmonary. Cara Cullen reports significant acid reflux symptoms.     Past Medical History:   Diagnosis Date    Acne     Asthma     Deep vein thrombosis (DVT) (HCC)     right leg    Herniated disc     L4-L5    Hip pain, left     Hyperlipidemia     Kidney stones     Rash and nonspecific skin eruption     TMJ (temporomandibular joint disorder)     Past Surgical History:   Procedure Laterality Date    BUNIONECTOMY Right 2019     SECTION      CHOLECYSTECTOMY, LAPAROSCOPIC N/A 2021    LAPAROSCOPIC CHOLECYSTECTOMY  WITH CHOLANGIOGRAMS performed by Babita Friend MD at [address] [place] [address] ([address] [place] [address])      KIDNEY STONE SURGERY Left     THYROID SURGERY      UPPER GASTROINTESTINAL ENDOSCOPY N/A 2023    EGD ESOPHAGOGASTRODUODENOSCOPY WITH BIOPSIES performed by Joseph Shore MD at [facility]        Family History   Problem Relation Age of Onset    Heart Disease Mother     High Blood Pressure Father     Heart Disease Father     Colon Cancer Neg Hx     Social History     Tobacco Use    Smoking status: Former     Packs/day: 0.25     Years: 20.00     Additional pack years: 0.00     Total pack years: 5.00     Types: Cigarettes     Quit date:      Years since quittin.8    Smokeless tobacco: Never   Vaping Use    Vaping Use: Never used   Substance Use Topics    Alcohol use: No    Drug use: No        Allergies: Augmentin [amoxicillin-pot

## 2023-10-31 ENCOUNTER — HOSPITAL ENCOUNTER (OUTPATIENT)
Dept: PREADMISSION TESTING | Age: 68
Discharge: HOME | End: 2023-11-04
Payer: COMMERCIAL

## 2023-10-31 VITALS
OXYGEN SATURATION: 95 % | BODY MASS INDEX: 37.02 KG/M2 | RESPIRATION RATE: 18 BRPM | HEART RATE: 55 BPM | WEIGHT: 201.2 LBS | HEIGHT: 62 IN | DIASTOLIC BLOOD PRESSURE: 72 MMHG | SYSTOLIC BLOOD PRESSURE: 131 MMHG | TEMPERATURE: 96.9 F

## 2023-10-31 LAB
ANION GAP SERPL CALCULATED.3IONS-SCNC: 11 MEQ/L (ref 9–15)
BUN SERPL-MCNC: 27 MG/DL (ref 8–23)
CALCIUM SERPL-MCNC: 10.4 MG/DL (ref 8.5–9.9)
CHLORIDE SERPL-SCNC: 102 MEQ/L (ref 95–107)
CO2 SERPL-SCNC: 27 MEQ/L (ref 20–31)
CREAT SERPL-MCNC: 1.02 MG/DL (ref 0.5–0.9)
ERYTHROCYTE [DISTWIDTH] IN BLOOD BY AUTOMATED COUNT: 13.3 % (ref 11.5–14.5)
GLUCOSE SERPL-MCNC: 96 MG/DL (ref 70–99)
HCT VFR BLD AUTO: 42.7 % (ref 37–47)
HGB BLD-MCNC: 13.8 G/DL (ref 12–16)
MCH RBC QN AUTO: 29.1 PG (ref 27–31.3)
MCHC RBC AUTO-ENTMCNC: 32.3 % (ref 33–37)
MCV RBC AUTO: 89.9 FL (ref 79.4–94.8)
PLATELET # BLD AUTO: 217 K/UL (ref 130–400)
POTASSIUM SERPL-SCNC: 4.2 MEQ/L (ref 3.4–4.9)
RBC # BLD AUTO: 4.75 M/UL (ref 4.2–5.4)
SODIUM SERPL-SCNC: 140 MEQ/L (ref 135–144)
WBC # BLD AUTO: 7.6 K/UL (ref 4.8–10.8)

## 2023-10-31 PROCEDURE — 93005 ELECTROCARDIOGRAM TRACING: CPT | Performed by: SURGERY

## 2023-10-31 PROCEDURE — 85027 COMPLETE CBC AUTOMATED: CPT

## 2023-10-31 PROCEDURE — 80048 BASIC METABOLIC PNL TOTAL CA: CPT

## 2023-11-01 LAB
EKG ATRIAL RATE: 53 BPM
EKG P AXIS: 49 DEGREES
EKG P-R INTERVAL: 146 MS
EKG Q-T INTERVAL: 466 MS
EKG QRS DURATION: 86 MS
EKG QTC CALCULATION (BAZETT): 437 MS
EKG R AXIS: 47 DEGREES
EKG T AXIS: 50 DEGREES
EKG VENTRICULAR RATE: 53 BPM

## 2023-11-06 ENCOUNTER — ANESTHESIA EVENT (OUTPATIENT)
Dept: OPERATING ROOM | Age: 68
DRG: 621 | End: 2023-11-06
Payer: COMMERCIAL

## 2023-11-07 ENCOUNTER — HOSPITAL ENCOUNTER (INPATIENT)
Age: 68
LOS: 1 days | Discharge: HOME | DRG: 621 | End: 2023-11-08
Attending: SURGERY | Admitting: SURGERY
Payer: COMMERCIAL

## 2023-11-07 ENCOUNTER — ANESTHESIA (OUTPATIENT)
Dept: OPERATING ROOM | Age: 68
DRG: 621 | End: 2023-11-07
Payer: COMMERCIAL

## 2023-11-07 DIAGNOSIS — E66.01 MORBID OBESITY (HCC): Primary | ICD-10-CM

## 2023-11-07 LAB
GLUCOSE BLD-MCNC: 140 MG/DL (ref 70–99)
GLUCOSE BLD-MCNC: 158 MG/DL (ref 70–99)
GLUCOSE BLD-MCNC: 90 MG/DL (ref 70–99)
PERFORMED ON: ABNORMAL
PERFORMED ON: ABNORMAL
PERFORMED ON: NORMAL

## 2023-11-07 PROCEDURE — 0D164ZA BYPASS STOMACH TO JEJUNUM, PERCUTANEOUS ENDOSCOPIC APPROACH: ICD-10-PCS | Performed by: SURGERY

## 2023-11-07 PROCEDURE — 7100000001 HC PACU RECOVERY - ADDTL 15 MIN: Performed by: SURGERY

## 2023-11-07 PROCEDURE — 3700000000 HC ANESTHESIA ATTENDED CARE: Performed by: SURGERY

## 2023-11-07 PROCEDURE — 3600000009 HC SURGERY ROBOT BASE: Performed by: SURGERY

## 2023-11-07 PROCEDURE — 0BQT4ZZ REPAIR DIAPHRAGM, PERCUTANEOUS ENDOSCOPIC APPROACH: ICD-10-PCS | Performed by: SURGERY

## 2023-11-07 PROCEDURE — 3700000001 HC ADD 15 MINUTES (ANESTHESIA): Performed by: SURGERY

## 2023-11-07 PROCEDURE — 2500000003 HC RX 250 WO HCPCS: Performed by: NURSE ANESTHETIST, CERTIFIED REGISTERED

## 2023-11-07 PROCEDURE — A4217 STERILE WATER/SALINE, 500 ML: HCPCS | Performed by: SURGERY

## 2023-11-07 PROCEDURE — 2580000003 HC RX 258: Performed by: NURSE ANESTHETIST, CERTIFIED REGISTERED

## 2023-11-07 PROCEDURE — 3600000019 HC SURGERY ROBOT ADDTL 15MIN: Performed by: SURGERY

## 2023-11-07 PROCEDURE — 6360000002 HC RX W HCPCS: Performed by: STUDENT IN AN ORGANIZED HEALTH CARE EDUCATION/TRAINING PROGRAM

## 2023-11-07 PROCEDURE — 7100000000 HC PACU RECOVERY - FIRST 15 MIN: Performed by: SURGERY

## 2023-11-07 PROCEDURE — 2720000010 HC SURG SUPPLY STERILE: Performed by: SURGERY

## 2023-11-07 PROCEDURE — 6360000002 HC RX W HCPCS: Performed by: NURSE ANESTHETIST, CERTIFIED REGISTERED

## 2023-11-07 PROCEDURE — 1210000000 HC MED SURG R&B

## 2023-11-07 PROCEDURE — 6370000000 HC RX 637 (ALT 250 FOR IP): Performed by: SURGERY

## 2023-11-07 PROCEDURE — 2580000003 HC RX 258: Performed by: SURGERY

## 2023-11-07 PROCEDURE — 64488 TAP BLOCK BI INJECTION: CPT | Performed by: STUDENT IN AN ORGANIZED HEALTH CARE EDUCATION/TRAINING PROGRAM

## 2023-11-07 PROCEDURE — 2709999900 HC NON-CHARGEABLE SUPPLY: Performed by: SURGERY

## 2023-11-07 PROCEDURE — 8E0W4CZ ROBOTIC ASSISTED PROCEDURE OF TRUNK REGION, PERCUTANEOUS ENDOSCOPIC APPROACH: ICD-10-PCS | Performed by: SURGERY

## 2023-11-07 PROCEDURE — 6360000002 HC RX W HCPCS: Performed by: SURGERY

## 2023-11-07 PROCEDURE — 0DNU4ZZ RELEASE OMENTUM, PERCUTANEOUS ENDOSCOPIC APPROACH: ICD-10-PCS | Performed by: SURGERY

## 2023-11-07 PROCEDURE — S2900 ROBOTIC SURGICAL SYSTEM: HCPCS | Performed by: SURGERY

## 2023-11-07 RX ORDER — SODIUM CHLORIDE, SODIUM LACTATE, POTASSIUM CHLORIDE, CALCIUM CHLORIDE 600; 310; 30; 20 MG/100ML; MG/100ML; MG/100ML; MG/100ML
INJECTION, SOLUTION INTRAVENOUS CONTINUOUS PRN
Status: DISCONTINUED | OUTPATIENT
Start: 2023-11-07 | End: 2023-11-07 | Stop reason: SDUPTHER

## 2023-11-07 RX ORDER — SODIUM CHLORIDE 0.9 % (FLUSH) 0.9 %
5-40 SYRINGE (ML) INJECTION EVERY 12 HOURS SCHEDULED
Status: DISCONTINUED | OUTPATIENT
Start: 2023-11-07 | End: 2023-11-08 | Stop reason: HOSPADM

## 2023-11-07 RX ORDER — SODIUM CHLORIDE 0.9 % (FLUSH) 0.9 %
5-40 SYRINGE (ML) INJECTION PRN
Status: DISCONTINUED | OUTPATIENT
Start: 2023-11-07 | End: 2023-11-08 | Stop reason: HOSPADM

## 2023-11-07 RX ORDER — HEPARIN SODIUM 5000 [USP'U]/ML
5000 INJECTION, SOLUTION INTRAVENOUS; SUBCUTANEOUS 2 TIMES DAILY
Status: DISCONTINUED | OUTPATIENT
Start: 2023-11-07 | End: 2023-11-08 | Stop reason: HOSPADM

## 2023-11-07 RX ORDER — SODIUM CHLORIDE 0.9 % (FLUSH) 0.9 %
5-40 SYRINGE (ML) INJECTION EVERY 12 HOURS SCHEDULED
Status: DISCONTINUED | OUTPATIENT
Start: 2023-11-07 | End: 2023-11-07 | Stop reason: HOSPADM

## 2023-11-07 RX ORDER — ONDANSETRON 2 MG/ML
4 INJECTION INTRAMUSCULAR; INTRAVENOUS EVERY 6 HOURS PRN
Status: DISCONTINUED | OUTPATIENT
Start: 2023-11-07 | End: 2023-11-08 | Stop reason: HOSPADM

## 2023-11-07 RX ORDER — EPHEDRINE SULFATE 50 MG/ML
INJECTION, SOLUTION INTRAVENOUS PRN
Status: DISCONTINUED | OUTPATIENT
Start: 2023-11-07 | End: 2023-11-07 | Stop reason: SDUPTHER

## 2023-11-07 RX ORDER — SODIUM CHLORIDE 9 MG/ML
INJECTION, SOLUTION INTRAVENOUS PRN
Status: DISCONTINUED | OUTPATIENT
Start: 2023-11-07 | End: 2023-11-07 | Stop reason: HOSPADM

## 2023-11-07 RX ORDER — OXYCODONE HYDROCHLORIDE 5 MG/1
5 CAPSULE ORAL
Status: DISCONTINUED | OUTPATIENT
Start: 2023-11-07 | End: 2023-11-07 | Stop reason: HOSPADM

## 2023-11-07 RX ORDER — ROCURONIUM BROMIDE 10 MG/ML
INJECTION, SOLUTION INTRAVENOUS PRN
Status: DISCONTINUED | OUTPATIENT
Start: 2023-11-07 | End: 2023-11-07 | Stop reason: SDUPTHER

## 2023-11-07 RX ORDER — LIDOCAINE HYDROCHLORIDE 10 MG/ML
1 INJECTION, SOLUTION EPIDURAL; INFILTRATION; INTRACAUDAL; PERINEURAL
Status: DISCONTINUED | OUTPATIENT
Start: 2023-11-07 | End: 2023-11-07 | Stop reason: HOSPADM

## 2023-11-07 RX ORDER — METOCLOPRAMIDE HYDROCHLORIDE 5 MG/ML
10 INJECTION INTRAMUSCULAR; INTRAVENOUS
Status: DISCONTINUED | OUTPATIENT
Start: 2023-11-07 | End: 2023-11-07 | Stop reason: HOSPADM

## 2023-11-07 RX ORDER — ONDANSETRON 4 MG/1
4 TABLET, ORALLY DISINTEGRATING ORAL EVERY 8 HOURS PRN
Status: DISCONTINUED | OUTPATIENT
Start: 2023-11-07 | End: 2023-11-08 | Stop reason: HOSPADM

## 2023-11-07 RX ORDER — OXYCODONE HYDROCHLORIDE 5 MG/1
10 TABLET ORAL EVERY 4 HOURS PRN
Status: DISCONTINUED | OUTPATIENT
Start: 2023-11-08 | End: 2023-11-08 | Stop reason: HOSPADM

## 2023-11-07 RX ORDER — PROPOFOL 10 MG/ML
INJECTION, EMULSION INTRAVENOUS PRN
Status: DISCONTINUED | OUTPATIENT
Start: 2023-11-07 | End: 2023-11-07 | Stop reason: SDUPTHER

## 2023-11-07 RX ORDER — SODIUM CHLORIDE 0.9 % (FLUSH) 0.9 %
5-40 SYRINGE (ML) INJECTION PRN
Status: DISCONTINUED | OUTPATIENT
Start: 2023-11-07 | End: 2023-11-07 | Stop reason: HOSPADM

## 2023-11-07 RX ORDER — GLYCOPYRROLATE 1 MG/5 ML
SYRINGE (ML) INTRAVENOUS PRN
Status: DISCONTINUED | OUTPATIENT
Start: 2023-11-07 | End: 2023-11-07 | Stop reason: SDUPTHER

## 2023-11-07 RX ORDER — DIPHENHYDRAMINE HYDROCHLORIDE 50 MG/ML
12.5 INJECTION INTRAMUSCULAR; INTRAVENOUS
Status: DISCONTINUED | OUTPATIENT
Start: 2023-11-07 | End: 2023-11-07 | Stop reason: HOSPADM

## 2023-11-07 RX ORDER — SODIUM CHLORIDE 9 MG/ML
INJECTION, SOLUTION INTRAVENOUS CONTINUOUS
Status: DISCONTINUED | OUTPATIENT
Start: 2023-11-07 | End: 2023-11-08 | Stop reason: HOSPADM

## 2023-11-07 RX ORDER — MAGNESIUM HYDROXIDE 1200 MG/15ML
LIQUID ORAL CONTINUOUS PRN
Status: DISCONTINUED | OUTPATIENT
Start: 2023-11-07 | End: 2023-11-07 | Stop reason: HOSPADM

## 2023-11-07 RX ORDER — FENTANYL CITRATE 50 UG/ML
INJECTION, SOLUTION INTRAMUSCULAR; INTRAVENOUS PRN
Status: DISCONTINUED | OUTPATIENT
Start: 2023-11-07 | End: 2023-11-07 | Stop reason: SDUPTHER

## 2023-11-07 RX ORDER — ACETAMINOPHEN 325 MG/1
650 TABLET ORAL EVERY 6 HOURS
Status: DISCONTINUED | OUTPATIENT
Start: 2023-11-08 | End: 2023-11-08 | Stop reason: HOSPADM

## 2023-11-07 RX ORDER — OXYCODONE HYDROCHLORIDE 5 MG/1
5 TABLET ORAL EVERY 4 HOURS PRN
Status: DISCONTINUED | OUTPATIENT
Start: 2023-11-08 | End: 2023-11-08 | Stop reason: HOSPADM

## 2023-11-07 RX ORDER — SODIUM CHLORIDE, SODIUM LACTATE, POTASSIUM CHLORIDE, CALCIUM CHLORIDE 600; 310; 30; 20 MG/100ML; MG/100ML; MG/100ML; MG/100ML
INJECTION, SOLUTION INTRAVENOUS CONTINUOUS
Status: DISCONTINUED | OUTPATIENT
Start: 2023-11-07 | End: 2023-11-07 | Stop reason: HOSPADM

## 2023-11-07 RX ORDER — ONDANSETRON 2 MG/ML
4 INJECTION INTRAMUSCULAR; INTRAVENOUS
Status: DISCONTINUED | OUTPATIENT
Start: 2023-11-07 | End: 2023-11-07 | Stop reason: HOSPADM

## 2023-11-07 RX ORDER — ROPIVACAINE HYDROCHLORIDE 5 MG/ML
INJECTION, SOLUTION EPIDURAL; INFILTRATION; PERINEURAL
Status: COMPLETED | OUTPATIENT
Start: 2023-11-07 | End: 2023-11-07

## 2023-11-07 RX ORDER — MAGNESIUM HYDROXIDE 1200 MG/15ML
LIQUID ORAL CONTINUOUS PRN
Status: COMPLETED | OUTPATIENT
Start: 2023-11-07 | End: 2023-11-07

## 2023-11-07 RX ORDER — ONDANSETRON 2 MG/ML
4 INJECTION INTRAMUSCULAR; INTRAVENOUS ONCE
Status: COMPLETED | OUTPATIENT
Start: 2023-11-07 | End: 2023-11-07

## 2023-11-07 RX ORDER — SODIUM CHLORIDE 9 MG/ML
INJECTION, SOLUTION INTRAVENOUS PRN
Status: DISCONTINUED | OUTPATIENT
Start: 2023-11-07 | End: 2023-11-08 | Stop reason: HOSPADM

## 2023-11-07 RX ORDER — HEPARIN SODIUM 5000 [USP'U]/ML
5000 INJECTION, SOLUTION INTRAVENOUS; SUBCUTANEOUS ONCE
Status: COMPLETED | OUTPATIENT
Start: 2023-11-07 | End: 2023-11-07

## 2023-11-07 RX ORDER — MIDAZOLAM HYDROCHLORIDE 1 MG/ML
INJECTION INTRAMUSCULAR; INTRAVENOUS PRN
Status: DISCONTINUED | OUTPATIENT
Start: 2023-11-07 | End: 2023-11-07 | Stop reason: SDUPTHER

## 2023-11-07 RX ORDER — FENTANYL CITRATE 0.05 MG/ML
50 INJECTION, SOLUTION INTRAMUSCULAR; INTRAVENOUS EVERY 10 MIN PRN
Status: DISCONTINUED | OUTPATIENT
Start: 2023-11-07 | End: 2023-11-07 | Stop reason: HOSPADM

## 2023-11-07 RX ORDER — HYDROMORPHONE HYDROCHLORIDE 1 MG/ML
1 INJECTION, SOLUTION INTRAMUSCULAR; INTRAVENOUS; SUBCUTANEOUS
Status: DISCONTINUED | OUTPATIENT
Start: 2023-11-07 | End: 2023-11-08 | Stop reason: HOSPADM

## 2023-11-07 RX ADMIN — HYDROMORPHONE HYDROCHLORIDE 1 MG: 1 INJECTION, SOLUTION INTRAMUSCULAR; INTRAVENOUS; SUBCUTANEOUS at 11:31

## 2023-11-07 RX ADMIN — HYDROMORPHONE HYDROCHLORIDE 1 MG: 1 INJECTION, SOLUTION INTRAMUSCULAR; INTRAVENOUS; SUBCUTANEOUS at 17:47

## 2023-11-07 RX ADMIN — EPHEDRINE SULFATE 10 MG: 50 INJECTION, SOLUTION INTRAVENOUS at 10:03

## 2023-11-07 RX ADMIN — SODIUM CHLORIDE: 9 INJECTION, SOLUTION INTRAVENOUS at 20:01

## 2023-11-07 RX ADMIN — HEPARIN SODIUM 5000 UNITS: 5000 INJECTION, SOLUTION INTRAVENOUS; SUBCUTANEOUS at 06:33

## 2023-11-07 RX ADMIN — EPHEDRINE SULFATE 10 MG: 50 INJECTION, SOLUTION INTRAVENOUS at 09:39

## 2023-11-07 RX ADMIN — HYDROMORPHONE HYDROCHLORIDE 1 MG: 1 INJECTION, SOLUTION INTRAMUSCULAR; INTRAVENOUS; SUBCUTANEOUS at 22:43

## 2023-11-07 RX ADMIN — MIDAZOLAM HYDROCHLORIDE 2 MG: 1 INJECTION, SOLUTION INTRAMUSCULAR; INTRAVENOUS at 07:28

## 2023-11-07 RX ADMIN — ONDANSETRON 4 MG: 2 INJECTION INTRAMUSCULAR; INTRAVENOUS at 06:27

## 2023-11-07 RX ADMIN — ROCURONIUM BROMIDE 30 MG: 10 INJECTION, SOLUTION INTRAVENOUS at 09:05

## 2023-11-07 RX ADMIN — SUGAMMADEX 350 MG: 100 INJECTION, SOLUTION INTRAVENOUS at 10:12

## 2023-11-07 RX ADMIN — ROPIVACAINE HYDROCHLORIDE 50 ML: 5 INJECTION, SOLUTION EPIDURAL; INFILTRATION; PERINEURAL at 07:41

## 2023-11-07 RX ADMIN — ROCURONIUM BROMIDE 50 MG: 10 INJECTION, SOLUTION INTRAVENOUS at 07:38

## 2023-11-07 RX ADMIN — EPHEDRINE SULFATE 10 MG: 50 INJECTION, SOLUTION INTRAVENOUS at 07:49

## 2023-11-07 RX ADMIN — EPHEDRINE SULFATE 10 MG: 50 INJECTION, SOLUTION INTRAVENOUS at 07:51

## 2023-11-07 RX ADMIN — SODIUM CHLORIDE, POTASSIUM CHLORIDE, SODIUM LACTATE AND CALCIUM CHLORIDE: 600; 310; 30; 20 INJECTION, SOLUTION INTRAVENOUS at 07:30

## 2023-11-07 RX ADMIN — CEFAZOLIN 2000 MG: 2 INJECTION, POWDER, FOR SOLUTION INTRAMUSCULAR; INTRAVENOUS at 07:45

## 2023-11-07 RX ADMIN — HYDROMORPHONE HYDROCHLORIDE 1 MG: 1 INJECTION, SOLUTION INTRAMUSCULAR; INTRAVENOUS; SUBCUTANEOUS at 14:35

## 2023-11-07 RX ADMIN — HEPARIN SODIUM 5000 UNITS: 5000 INJECTION INTRAVENOUS; SUBCUTANEOUS at 16:29

## 2023-11-07 RX ADMIN — FENTANYL CITRATE 100 MCG: 50 INJECTION, SOLUTION INTRAMUSCULAR; INTRAVENOUS at 07:31

## 2023-11-07 RX ADMIN — SODIUM CHLORIDE, POTASSIUM CHLORIDE, SODIUM LACTATE AND CALCIUM CHLORIDE: 600; 310; 30; 20 INJECTION, SOLUTION INTRAVENOUS at 06:27

## 2023-11-07 RX ADMIN — PHENYLEPHRINE HYDROCHLORIDE 100 MCG: 10 INJECTION INTRAVENOUS at 09:39

## 2023-11-07 RX ADMIN — SODIUM CHLORIDE: 9 INJECTION, SOLUTION INTRAVENOUS at 11:36

## 2023-11-07 RX ADMIN — EPHEDRINE SULFATE 10 MG: 50 INJECTION, SOLUTION INTRAVENOUS at 07:52

## 2023-11-07 RX ADMIN — CEFAZOLIN 2000 MG: 2 INJECTION, POWDER, FOR SOLUTION INTRAMUSCULAR; INTRAVENOUS at 16:25

## 2023-11-07 RX ADMIN — SODIUM CHLORIDE, PRESERVATIVE FREE 10 ML: 5 INJECTION INTRAVENOUS at 20:02

## 2023-11-07 RX ADMIN — ROCURONIUM BROMIDE 30 MG: 10 INJECTION, SOLUTION INTRAVENOUS at 08:11

## 2023-11-07 RX ADMIN — GABAPENTIN 400 MG: 300 CAPSULE ORAL at 06:31

## 2023-11-07 RX ADMIN — PROPOFOL 200 MG: 10 INJECTION, EMULSION INTRAVENOUS at 07:38

## 2023-11-07 RX ADMIN — Medication 0.2 MG: at 07:55

## 2023-11-07 ASSESSMENT — PAIN SCALES - GENERAL
PAINLEVEL_OUTOF10: 6
PAINLEVEL_OUTOF10: 7
PAINLEVEL_OUTOF10: 2
PAINLEVEL_OUTOF10: 0
PAINLEVEL_OUTOF10: 0
PAINLEVEL_OUTOF10: 8
PAINLEVEL_OUTOF10: 3
PAINLEVEL_OUTOF10: 3
PAINLEVEL_OUTOF10: 0
PAINLEVEL_OUTOF10: 3
PAINLEVEL_OUTOF10: 5

## 2023-11-07 ASSESSMENT — PAIN SCALES - WONG BAKER
WONGBAKER_NUMERICALRESPONSE: 2;4
WONGBAKER_NUMERICALRESPONSE: 0

## 2023-11-07 ASSESSMENT — PAIN - FUNCTIONAL ASSESSMENT
PAIN_FUNCTIONAL_ASSESSMENT: 0-10
PAIN_FUNCTIONAL_ASSESSMENT: 0-10

## 2023-11-07 ASSESSMENT — PAIN DESCRIPTION - LOCATION: LOCATION: ABDOMEN

## 2023-11-07 NOTE — CARE COORDINATION
family were provided with a choice of provider and agrees with the discharge plan. Freedom of choice list with basic dialogue that supports the patient's individualized plan of care/goals and shares the quality data associated with the providers was provided to:     Patient Representative Name:       The Patient and/or Patient Representative Agree with the Discharge Plan?       Brittaney Perry  Case Management Department

## 2023-11-07 NOTE — ANESTHESIA PRE PROCEDURE
Department of Anesthesiology  Preprocedure Note       Name:  Leilani Dumas   Age:  80 y.o.  :  1955                                          MRN:  [id number]         Date:  2023      Surgeon: Lo Haywood):  Keisha Dempsey MD    Procedure: Procedure(s):  GASTRIC BYPASS JAXSON-EN-Y LAPAROSCOPIC/ROBOTIC    Medications prior to admission:   Prior to Admission medications    Medication Sig Start Date End Date Taking?  Authorizing Provider   topiramate (TOPAMAX) 50 MG tablet Take 1 tablet by mouth 2 times daily 10/3/23   MD NADIYA Forrest 5000 125 MCG (5000 UT) CAPS capsule TAKE 1 CAPSULE DAILY FOR VITAMIN D DEFICIENCY  Patient taking differently: Take 1 capsule by mouth daily TAKE 1 CAPSULE DAILY FOR VITAMIN D DEFICIENCY 23   Milagros Huang PA-C   fluticasone-salmeterol (ADVAIR DISKUS) 100-50 MCG/ACT AEPB diskus inhaler Inhale 1 puff into the lungs in the morning and 1 puff in the evening. 23   Milagros Huang PA-C   Calcium Citrate-Vitamin D (CALCIUM CITRATE + PO) Take 650 mg by mouth 2 times daily    Ingrid Sanchez MD   Multiple Vitamins-Minerals (BARIATRIC MULTIVITAMINS/IRON PO) Take 45 mcg by mouth daily    Ingrid Sanchez MD   Probiotic Product (FORTIFY PROBIOTIC WOMENS PO) Take by mouth daily    Ingrid Sanchez MD   docusate sodium (COLACE) 100 MG capsule Take 1 capsule by mouth 2 times daily  Patient taking differently: Take 1 capsule by mouth 2 times daily as needed 23  Manohar Huang PA-C   pantoprazole (PROTONIX) 40 MG tablet TAKE 1 TABLET EVERY MORNING BEFORE BREAKFAST 23   Milagros Huang PA-C   rosuvastatin (CRESTOR) 5 MG tablet TAKE 1 TABLET DAILY 23   Milagros Huang PA-C   oxybutynin (DITROPAN XL) 5 MG extended release tablet Take 1 tablet by mouth daily 23   Milagros Huang PA-C   melatonin 5 MG TABS tablet Take 1 tablet by mouth daily    Ingrid Sanchez MD   metFORMIN (GLUCOPHAGE) 500 MG tablet TAKE ONE-HALF ()

## 2023-11-07 NOTE — ANESTHESIA POSTPROCEDURE EVALUATION
Department of Anesthesiology  Postprocedure Note    Patient: Erven Eisenmenger  MRN: [id number]  YOB: 1955  Date of evaluation: 11/7/2023      Procedure Summary     Date: 11/07/23 Room / Location: [address]    Anesthesia Start: 0730 Anesthesia Stop: 5362    Procedure: GASTRIC BYPASS JAXSON-EN-Y HIATAL HERNIA REPAIR LAPAROSCOPIC/ROBOTIC Diagnosis:       Obesity      Type 2 diabetes mellitus ([address])      Gastroesophageal reflux disease      Hyperlipidemia      (Obesity [E66.9])      (Type 2 diabetes mellitus ([address]) [E11.9])      (Gastroesophageal reflux disease [K21.9])      (Hyperlipidemia [E78.5])    Surgeons: Morgan Cooley MD Responsible Provider: Jonny Solo DO    Anesthesia Type: general, regional ASA Status: 3          Anesthesia Type: No value filed.     Yesi Phase I: Yesi Score: 8    Yesi Phase II:        Anesthesia Post Evaluation    Patient location during evaluation: bedside  Patient participation: complete - patient participated  Level of consciousness: responsive to light touch  Pain score: 0  Airway patency: patent  Nausea & Vomiting: no nausea and no vomiting  Complications: no  Cardiovascular status: blood pressure returned to baseline and hemodynamically stable  Respiratory status: nonlabored ventilation, face mask, spontaneous ventilation, acceptable and airway suctioned  Hydration status: euvolemic  Pain management: adequate

## 2023-11-07 NOTE — ACP (ADVANCE CARE PLANNING)
Advance Care Planning   Healthcare Decision Maker:    Primary Decision Maker: Keli Dave Spouse - [phone]    Secondary Decision Maker: Reuben Renae - Child - [phone]    Click here to complete Healthcare Decision Makers including selection of the Healthcare Decision Maker Relationship (ie \"Primary\"). Info from patient today when asked.

## 2023-11-07 NOTE — OP NOTE
out through the left, lateral skin incision. The drain was secured to the skin with a 3-0 nylon suture. CO2 was then evacuated from the abdomen and the skin incisions were closed with surgical staples and Band-Aids were applied. All needle sponge and instrument counts were reported as correct at the end of the case and the patient tolerated the procedure well. Disposition: To PACU.     Nevin Goldberg, MD, FASMBS, FACS, [facility] Verified Surgeon  [id number], Diplomate of The American Board of Surgery      Electronically signed by Rubi Rivera MD on 11/7/2023 at 10:38 AM

## 2023-11-07 NOTE — ANESTHESIA PROCEDURE NOTES
Peripheral Block    Patient location during procedure: OR  Reason for block: post-op pain management and at surgeon's request  Start time: 11/7/2023 7:41 AM  End time: 11/7/2023 7:48 AM  Staffing  Performed: anesthesiologist   Anesthesiologist: Damián Huang DO  Performed by: Damián Huang DO  Authorized by: Damián Huang DO    Preanesthetic Checklist  Completed: patient identified, IV checked, site marked, risks and benefits discussed, surgical/procedural consents, equipment checked, pre-op evaluation, timeout performed, anesthesia consent given, oxygen available and monitors applied/VS acknowledged  Peripheral Block   Patient position: supine  Prep: ChloraPrep  Provider prep: mask and sterile gloves  Patient monitoring: cardiac monitor, continuous pulse ox, frequent blood pressure checks and IV access  Block type: TAP and Rectus sheath  Laterality: bilateral  Injection technique: single-shot  Guidance: ultrasound guided  Local infiltration: ropivacaine  Infiltration strength: 0.5 %  Local infiltration: ropivacaine  Dose: 30 mL    Needle   Needle type: combined needle/nerve stimulator   Needle gauge: 22 G  Needle localization: anatomical landmarks and ultrasound guidance  Needle length: 10 cm  Assessment   Injection assessment: negative aspiration for heme, no paresthesia on injection and local visualized surrounding nerve on ultrasound  Paresthesia pain: immediately resolved  Slow fractionated injection: yes  Hemodynamics: stable  Real-time US image taken/store: yes    Additional Notes  Ultrasound image printed and saved in patient chart.     Sterile probe cover used    Ropivacaine 0.5% 50 ml + saline 40 ml    10 ml bilateral Rectus sheath  35 ml bilateral TAP    Medications Administered  ropivacaine (NAROPIN) injection 0.5% - Perineural   50 mL - 11/7/2023 7:41:00 AM

## 2023-11-08 VITALS
SYSTOLIC BLOOD PRESSURE: 119 MMHG | RESPIRATION RATE: 18 BRPM | BODY MASS INDEX: 36.99 KG/M2 | OXYGEN SATURATION: 98 % | WEIGHT: 201 LBS | HEIGHT: 62 IN | HEART RATE: 59 BPM | DIASTOLIC BLOOD PRESSURE: 59 MMHG | TEMPERATURE: 98.8 F

## 2023-11-08 LAB
BASOPHILS # BLD: 0 K/UL (ref 0–0.2)
BASOPHILS NFR BLD: 0.1 %
EOSINOPHIL # BLD: 0 K/UL (ref 0–0.7)
EOSINOPHIL NFR BLD: 0 %
ERYTHROCYTE [DISTWIDTH] IN BLOOD BY AUTOMATED COUNT: 13.4 % (ref 11.5–14.5)
GLUCOSE BLD-MCNC: 92 MG/DL (ref 70–99)
HCT VFR BLD AUTO: 37.4 % (ref 37–47)
HGB BLD-MCNC: 11.8 G/DL (ref 12–16)
LYMPHOCYTES # BLD: 0.9 K/UL (ref 1–4.8)
LYMPHOCYTES NFR BLD: 6.1 %
MCH RBC QN AUTO: 28.7 PG (ref 27–31.3)
MCHC RBC AUTO-ENTMCNC: 31.6 % (ref 33–37)
MCV RBC AUTO: 91 FL (ref 79.4–94.8)
MONOCYTES # BLD: 1 K/UL (ref 0.2–0.8)
MONOCYTES NFR BLD: 6.7 %
NEUTROPHILS # BLD: 13.4 K/UL (ref 1.4–6.5)
NEUTS SEG NFR BLD: 86.6 %
PERFORMED ON: NORMAL
PLATELET # BLD AUTO: 187 K/UL (ref 130–400)
RBC # BLD AUTO: 4.11 M/UL (ref 4.2–5.4)
WBC # BLD AUTO: 15.5 K/UL (ref 4.8–10.8)

## 2023-11-08 PROCEDURE — 6370000000 HC RX 637 (ALT 250 FOR IP): Performed by: SURGERY

## 2023-11-08 PROCEDURE — 36415 COLL VENOUS BLD VENIPUNCTURE: CPT

## 2023-11-08 PROCEDURE — 2580000003 HC RX 258: Performed by: SURGERY

## 2023-11-08 PROCEDURE — 6360000002 HC RX W HCPCS: Performed by: SURGERY

## 2023-11-08 PROCEDURE — 85025 COMPLETE CBC W/AUTO DIFF WBC: CPT

## 2023-11-08 RX ORDER — ONDANSETRON 4 MG/1
4 TABLET, ORALLY DISINTEGRATING ORAL EVERY 8 HOURS PRN
Qty: 30 TABLET | Refills: 3 | Status: SHIPPED | OUTPATIENT
Start: 2023-11-08

## 2023-11-08 RX ORDER — OXYCODONE HYDROCHLORIDE 5 MG/1
5 TABLET ORAL EVERY 6 HOURS PRN
Qty: 20 TABLET | Refills: 0 | Status: SHIPPED | OUTPATIENT
Start: 2023-11-08 | End: 2023-11-13

## 2023-11-08 RX ORDER — GABAPENTIN 400 MG/1
400 CAPSULE ORAL ONCE
Status: COMPLETED | OUTPATIENT
Start: 2023-11-08 | End: 2023-11-08

## 2023-11-08 RX ORDER — GABAPENTIN 400 MG/1
400 CAPSULE ORAL 2 TIMES DAILY
Qty: 10 CAPSULE | Refills: 0 | Status: SHIPPED | OUTPATIENT
Start: 2023-11-08 | End: 2023-11-13

## 2023-11-08 RX ORDER — ACETAMINOPHEN 500 MG
500 TABLET ORAL 4 TIMES DAILY PRN
Qty: 120 TABLET | Refills: 0 | Status: SHIPPED | OUTPATIENT
Start: 2023-11-08

## 2023-11-08 RX ADMIN — OXYCODONE HYDROCHLORIDE 5 MG: 5 TABLET ORAL at 14:00

## 2023-11-08 RX ADMIN — GABAPENTIN 400 MG: 400 CAPSULE ORAL at 11:27

## 2023-11-08 RX ADMIN — SODIUM CHLORIDE: 9 INJECTION, SOLUTION INTRAVENOUS at 11:21

## 2023-11-08 RX ADMIN — HYDROMORPHONE HYDROCHLORIDE 1 MG: 1 INJECTION, SOLUTION INTRAMUSCULAR; INTRAVENOUS; SUBCUTANEOUS at 04:07

## 2023-11-08 RX ADMIN — HEPARIN SODIUM 5000 UNITS: 5000 INJECTION INTRAVENOUS; SUBCUTANEOUS at 07:43

## 2023-11-08 RX ADMIN — HYDROMORPHONE HYDROCHLORIDE 1 MG: 1 INJECTION, SOLUTION INTRAMUSCULAR; INTRAVENOUS; SUBCUTANEOUS at 07:43

## 2023-11-08 RX ADMIN — SODIUM CHLORIDE: 9 INJECTION, SOLUTION INTRAVENOUS at 04:05

## 2023-11-08 RX ADMIN — CEFAZOLIN 2000 MG: 2 INJECTION, POWDER, FOR SOLUTION INTRAMUSCULAR; INTRAVENOUS at 00:01

## 2023-11-08 RX ADMIN — ACETAMINOPHEN 650 MG: 325 TABLET ORAL at 11:21

## 2023-11-08 ASSESSMENT — PAIN SCALES - GENERAL
PAINLEVEL_OUTOF10: 9
PAINLEVEL_OUTOF10: 7
PAINLEVEL_OUTOF10: 2
PAINLEVEL_OUTOF10: 4

## 2023-11-08 ASSESSMENT — PAIN DESCRIPTION - DESCRIPTORS
DESCRIPTORS: DISCOMFORT
DESCRIPTORS: CRAMPING

## 2023-11-08 ASSESSMENT — PAIN DESCRIPTION - LOCATION
LOCATION: ABDOMEN
LOCATION: ABDOMEN

## 2023-11-08 NOTE — PROGRESS NOTES
0730 Shift assessment complete. Pt is AxOx4. Meds given per mar. Sip challenge started. Dressing on abd are CDI. LISA intact, bloody drainage noted. Pt denies any needs at this time. Call light within reach. Electronically signed by Glenna Pearl RN on 11/8/2023 at 8:05 AM      1130 Tylenol and gabapentin given. Pt ambulating in the hallways. Electronically signed by Glenna Pearl RN on 11/8/2023 at 11:44 AM      1445 IV removed without complication. Pt tolerated well. Catheter intact, dressing applied. No drainage noted. Discharge instructions provided to patient and spouse. Verbalized understanding of follow up appointments, diet, activity, wound care, medications and reasons to return to ED/call physician. All questions answered. Copy of discharge instructions provided. Assisted into wheelchair and taken to personal car. Denies further needs.      Electronically signed by Glenna Pearl RN on 11/8/2023 at 2:47 PM
1130 Pt arrived from PACU. Pt is AxOx4. Repositioned in bed. Dressing are CDI. LISA intact, compressed. Pt c/o pain 8/10 - medicated with Dilaudid. Admission questions answered. Resting at this time.  at the bedside. Call light within reach. 1430 Admission assessment complete. Pt c/o pain 6/10 - Dilaudid given. Call light within reach.      Electronically signed by Belinda Bey RN on 11/7/2023 at 3:04 PM
1400  Patient report increased pain, patient made aware that pain medication for her would be in 30 min. Patient placed on her right side. Patient report some relief with change of position.
CLINICAL PHARMACY NOTE: MEDS TO BEDS    Total # of Prescriptions Filled: 4   The following medications were delivered to the patient:  Acetaminophen 500 mg tab  Oxycodone 5 mg tab  Ondansetron 4 mg ODT   Gabapentin 400 mg cap    Additional Documentation:
PT DOES NOT HAVE A HOME CPAP MACHINE AT HOME,DOES NOT WEAR
Shift assessments complete, see flowsheets. Medication administered per MAR, VSS. Pt alert and oriented x 4. Pt lap sites are clean dry and intact, no drainage noted. Pt tolerating one ice chip per hr, denies nausea or vomiting. No further needs verbalized at this time. Pt left resting with call light within reach.   Electronically signed by Nancy Bales RN on 11/7/2023 at 9:50 PM
protein powder (amount to obtain 20g of protein)  8 ounces of water  Sugar free flavoring (crystal light, water enhancer drops or packets)    Mix ingredients well. May adjust the water and flavoring to taste. Decrease water for homemade protein shot. Increase water for homemade protein drink. High Protein Jell-O    Dissolve sugar free Jell-O per package directions  After dissolving, cool for 3-5 minutes  While cooling, mix unflavored protein powder with 1 cup cold water, stirring to dissolve powder. Stir protein and water into cooling [address] [employer]  Phone: [phone]            Post-Op Weight Loss Surgery Zones        Everyday  Drink water - 64 oz or more a day. Eat protein first every meal. Aim for goal of 60 grams or more protein daily. Small bites, chew well, follow dietary guidelines. Avoid sugar, sugar containing foods and beverages. Avoid foods high in fat  Exercise regularly   [address] - This is your goal    Tracking all fluid and protein intake   Meeting fluid goal of 64 oz daily  Meeting protein goal of 60 grams or more daily  Nausea and vomiting adequately managed with anti-nausea medication  Compliant with post-op diet progression   Stay in the green! Track fluids & protein to consistently meet goals  Keep all follow-up appointments  Follow post-op diet progression. Regularly attend Support group meetings   YELLOW ZONE ACTION   CAUTION- This zone is a warning    If fluid intake is less than 40 oz daily. Symptoms of dehydration:  Thirst   Headache  Dark urine  Hard stool  Lightheadedness  If you have nausea and/or vomiting not improving with anti-nausea medication. Increased or unusual pain in your abdomen that does not get better with pain medication. Temperature greater than 101. If you can't swallow any fluids. Thick, white, pus-like drainage from incision sites. Calf or leg pain.    Call the office

## 2023-11-08 NOTE — DISCHARGE SUMMARY
Patient ID  Saba Zheng   [id number]  1955     Admit date: 11/7/2023    Discharge date and time: No discharge date for patient encounter. Admitting Physician: Kenzie Vazquez MD     Discharge Physician: Leni Queen    Admission Diagnoses: Obesity [E66.9]  Type 2 diabetes mellitus ([address] [facility]) [E11.9]  Gastroesophageal reflux disease [K21.9]  Hyperlipidemia [E78.5]  Morbid obesity ([address] [facility]) [E66.01]    Discharge Diagnoses: ssame    Admission Condition: fair    Discharged Condition: fair    Indication for Admission: obesity and GERD. Surgical Procedures: Robotic Mami en Y gastric bypass    Hospital Course: Hospital Course: Patient was admitted to the surgical brannon following the above operation. She recovered uneventfully on the surgical brannon. At the time of discharge she was tolerating a Bariatric clear liquid diet, pain was controlled on PO medications, and she was ambulating and voiding. Consults: none    Discharge Exam:  Gen: alert, NAD  Lungs: Breathing comfortably on RA. No tachypnea, retractions, or increased WOB. Abd: soft, ND, appropriately tender  Incision:  healing well    Disposition: Home. Patient Instructions:   See D/C instructions    Activity: Do not lift anything heavier than 15 pounds for 2 weeks. Diet: Bariatric full liquids  Wound Care: may shower. Cover staples with band aids.     Kenzie Vazquez MD   11/8/2023  11:55 AM      Torie Gill MD, FASMBS, FACS, [facility] Verified Surgeon  LCDR-USN-RET, Diplomate of The American Board of Surgery

## 2023-11-09 ENCOUNTER — TELEPHONE (OUTPATIENT)
Dept: FAMILY MEDICINE CLINIC | Age: 68
End: 2023-11-09

## 2023-11-09 ENCOUNTER — TELEPHONE (OUTPATIENT)
Dept: BARIATRICS/WEIGHT MGMT | Age: 68
End: 2023-11-09

## 2023-11-09 NOTE — INTERVAL H&P NOTE
Update History & Physical    The patient's History and Physical of November 8, 2023 was reviewed with the patient and I examined the patient. There was no change. The surgical site was confirmed by the patient and me. Plan: The risks, benefits, expected outcome, and alternative to the recommended procedure have been discussed with the patient. Patient understands and wants to proceed with the procedure.      Electronically signed by Chuy Monet MD on 11/9/2023 at 4:28 PM

## 2023-11-09 NOTE — TELEPHONE ENCOUNTER
Care Transitions Initial Follow Up Call    Outreach made within 2 business days of discharge: Yes    Patient: Marcelo Frost Patient : 1955   MRN: [id number]  Reason for Admission: There are no discharge diagnoses documented for the most recent discharge. Discharge Date: 23       Spoke with: pt    Discharge department/facility: [facility] Interactive Patient Contact:  Was patient able to fill all prescriptions: Yes  Was patient instructed to bring all medications to the follow-up visit: Yes  Is patient taking all medications as directed in the discharge summary?  Yes  Does patient understand their discharge instructions: Yes  Does patient have questions or concerns that need addressed prior to 7-14 day follow up office visit: no    Declined follow up apptcarol with Peewee Marie later      Scheduled appointment with PCP within 7-14 days    Follow Up  Future Appointments   Date Time Provider [address] [place] [address]   11/15/2023  9:30 AM MD Miky Becker   11/15/2023 10:00 AM DANIELLE Yao   2023  9:00 AM MD Miky Becker   2023  9:30 AM [place] [address] [place] [address]   2023 10:00 AM Christian, [place], PSYD [place]   3/14/2024 11:30 AM Adi Dyer MD [place], [place]

## 2023-11-09 NOTE — TELEPHONE ENCOUNTER
Bariatric Post-Op Discharge Follow-Up Call    Type of Surgery: Gastric Bypass Mami-en-y Hiatal Hernia Repair Laparoscopic/robotic  Date of Surgery: 11/7/2023  Hospital Discharge Date: 11/8/2023    Post-op outreach call made to pt on 11/9/2023. Nausea present No  Vomiting present No  Nausea and/or vomiting managed by medication Yes  Fever >101/chills No  Chest pain/dyspnea/tachycardia No  Dysphagia to liquids No  Pain Yes. Patient rates pain at a 4      Pt reports frequent ambulation around home. Intake is described as Good  Fluid intake: Patient tolerating fluids well. Taking small sips every 10-15 minuets. 22 oz for far this morning. Expects to have > 40 oz by end of the day. Protein intake:  Approximately 10 grams in so far today. Pt reports urine output of at least 4 times in a 24 hour period. Passing flatus. BM No . Agrees to monitor for BM. Patient reports laparoscopic incision sites  no drainage and staples intact. Drain removed prior to hospital discharge. Drainage at site: None    Lovenox N/A  Checking BP  No .   Checking Blood Sugar No .            Have you scheduled post-op appointment with primary care provider? Yes     Future Appointments   Date Time Provider [address]   11/15/2023  9:30 AM MD Miky Bowman   11/15/2023 10:00 AM DANIELLE Jose   12/7/2023  9:00 AM MD Miky Bowman   12/7/2023  9:30 AM DANIELLE Jose   12/7/2023 10:00 AM Christian, [place], PSYD [facility]   3/14/2024 11:30 AM Jey Garcia MD [facility]   . Allowed pt the opportunity to ask questions. Reminded patient to reference the patient education materials as needed. Reminded pt that they may phone the office or the \"after hours telephone number\"  that is listed in the patient education handbook as needed.  Pt verbalized

## 2023-11-14 ENCOUNTER — OFFICE VISIT (OUTPATIENT)
Dept: FAMILY MEDICINE CLINIC | Age: 68
End: 2023-11-14
Payer: COMMERCIAL

## 2023-11-14 ENCOUNTER — PATIENT MESSAGE (OUTPATIENT)
Dept: FAMILY MEDICINE CLINIC | Age: 68
End: 2023-11-14

## 2023-11-14 VITALS
WEIGHT: 197 LBS | SYSTOLIC BLOOD PRESSURE: 110 MMHG | BODY MASS INDEX: 36.25 KG/M2 | HEIGHT: 62 IN | RESPIRATION RATE: 18 BRPM | HEART RATE: 68 BPM | OXYGEN SATURATION: 95 % | DIASTOLIC BLOOD PRESSURE: 70 MMHG

## 2023-11-14 DIAGNOSIS — E86.0 DEHYDRATION: ICD-10-CM

## 2023-11-14 DIAGNOSIS — R61 NIGHT SWEATS: ICD-10-CM

## 2023-11-14 DIAGNOSIS — K22.710 BARRETT'S ESOPHAGUS WITH LOW GRADE DYSPLASIA: ICD-10-CM

## 2023-11-14 DIAGNOSIS — K59.03 DRUG-INDUCED CONSTIPATION: ICD-10-CM

## 2023-11-14 DIAGNOSIS — E86.0 DEHYDRATION: Primary | ICD-10-CM

## 2023-11-14 DIAGNOSIS — Z98.84 S/P BARIATRIC SURGERY: ICD-10-CM

## 2023-11-14 DIAGNOSIS — N28.9 ABNORMAL RENAL FUNCTION: ICD-10-CM

## 2023-11-14 DIAGNOSIS — G25.81 RLS (RESTLESS LEGS SYNDROME): ICD-10-CM

## 2023-11-14 PROBLEM — D22.9 ATYPICAL MOLE: Status: RESOLVED | Noted: 2023-08-31 | Resolved: 2023-11-14

## 2023-11-14 PROBLEM — R63.5 WEIGHT GAIN: Status: RESOLVED | Noted: 2023-05-30 | Resolved: 2023-11-14

## 2023-11-14 PROBLEM — E66.01 MORBID OBESITY (HCC): Status: RESOLVED | Noted: 2023-11-07 | Resolved: 2023-11-14

## 2023-11-14 PROBLEM — E88.819 INSULIN RESISTANCE: Status: RESOLVED | Noted: 2022-01-21 | Resolved: 2023-11-14

## 2023-11-14 PROBLEM — E11.9 TYPE 2 DIABETES MELLITUS (HCC): Status: RESOLVED | Noted: 2023-01-30 | Resolved: 2023-11-14

## 2023-11-14 PROBLEM — E66.9 OBESITY: Status: RESOLVED | Noted: 2023-06-08 | Resolved: 2023-11-14

## 2023-11-14 PROBLEM — K21.9 GASTROESOPHAGEAL REFLUX DISEASE: Status: RESOLVED | Noted: 2023-09-18 | Resolved: 2023-11-14

## 2023-11-14 LAB
ANION GAP SERPL CALCULATED.3IONS-SCNC: 13 MEQ/L (ref 9–15)
BUN SERPL-MCNC: 20 MG/DL (ref 8–23)
CALCIUM SERPL-MCNC: 9.1 MG/DL (ref 8.5–9.9)
CHLORIDE SERPL-SCNC: 101 MEQ/L (ref 95–107)
CO2 SERPL-SCNC: 29 MEQ/L (ref 20–31)
CREAT SERPL-MCNC: 0.75 MG/DL (ref 0.5–0.9)
GLUCOSE SERPL-MCNC: 90 MG/DL (ref 70–99)
POTASSIUM SERPL-SCNC: 3.6 MEQ/L (ref 3.4–4.9)
SODIUM SERPL-SCNC: 143 MEQ/L (ref 135–144)
T4 FREE SERPL-MCNC: 1.27 NG/DL (ref 0.84–1.68)
TSH SERPL-MCNC: 1.36 UIU/ML (ref 0.44–3.86)

## 2023-11-14 PROCEDURE — 3017F COLORECTAL CA SCREEN DOC REV: CPT | Performed by: PHYSICIAN ASSISTANT

## 2023-11-14 PROCEDURE — G8484 FLU IMMUNIZE NO ADMIN: HCPCS | Performed by: PHYSICIAN ASSISTANT

## 2023-11-14 PROCEDURE — G8399 PT W/DXA RESULTS DOCUMENT: HCPCS | Performed by: PHYSICIAN ASSISTANT

## 2023-11-14 PROCEDURE — 1111F DSCHRG MED/CURRENT MED MERGE: CPT | Performed by: PHYSICIAN ASSISTANT

## 2023-11-14 PROCEDURE — 1036F TOBACCO NON-USER: CPT | Performed by: PHYSICIAN ASSISTANT

## 2023-11-14 PROCEDURE — 99214 OFFICE O/P EST MOD 30 MIN: CPT | Performed by: PHYSICIAN ASSISTANT

## 2023-11-14 PROCEDURE — G8417 CALC BMI ABV UP PARAM F/U: HCPCS | Performed by: PHYSICIAN ASSISTANT

## 2023-11-14 PROCEDURE — 1123F ACP DISCUSS/DSCN MKR DOCD: CPT | Performed by: PHYSICIAN ASSISTANT

## 2023-11-14 PROCEDURE — G8427 DOCREV CUR MEDS BY ELIG CLIN: HCPCS | Performed by: PHYSICIAN ASSISTANT

## 2023-11-14 PROCEDURE — 1090F PRES/ABSN URINE INCON ASSESS: CPT | Performed by: PHYSICIAN ASSISTANT

## 2023-11-14 RX ORDER — PRAMIPEXOLE DIHYDROCHLORIDE 0.12 MG/1
0.12 TABLET ORAL NIGHTLY
Qty: 90 TABLET | Refills: 3 | Status: SHIPPED | OUTPATIENT
Start: 2023-11-14

## 2023-11-14 RX ORDER — DOCUSATE SODIUM 100 MG/1
100 CAPSULE, LIQUID FILLED ORAL 2 TIMES DAILY
Qty: 180 CAPSULE | Refills: 2 | Status: SHIPPED | OUTPATIENT
Start: 2023-11-14 | End: 2024-08-10

## 2023-11-14 ASSESSMENT — ENCOUNTER SYMPTOMS
CONSTIPATION: 0
VOMITING: 0
COUGH: 0
ABDOMINAL DISTENTION: 1
BACK PAIN: 0
COLOR CHANGE: 0
SHORTNESS OF BREATH: 0
NAUSEA: 0
DIARRHEA: 0
CHEST TIGHTNESS: 0
WHEEZING: 0
RECTAL PAIN: 0
ABDOMINAL PAIN: 0

## 2023-11-15 ENCOUNTER — OFFICE VISIT (OUTPATIENT)
Dept: BARIATRICS/WEIGHT MGMT | Age: 68
End: 2023-11-15
Payer: COMMERCIAL

## 2023-11-15 ENCOUNTER — OFFICE VISIT (OUTPATIENT)
Dept: BARIATRICS/WEIGHT MGMT | Age: 68
End: 2023-11-15

## 2023-11-15 VITALS
DIASTOLIC BLOOD PRESSURE: 70 MMHG | WEIGHT: 195.6 LBS | BODY MASS INDEX: 35.99 KG/M2 | HEART RATE: 61 BPM | HEIGHT: 62 IN | SYSTOLIC BLOOD PRESSURE: 114 MMHG

## 2023-11-15 DIAGNOSIS — E11.69 DIABETES MELLITUS TYPE 2 IN OBESE (HCC): Primary | ICD-10-CM

## 2023-11-15 DIAGNOSIS — E66.01 MORBID OBESITY (HCC): Primary | ICD-10-CM

## 2023-11-15 DIAGNOSIS — E66.01 MORBID OBESITY (HCC): ICD-10-CM

## 2023-11-15 DIAGNOSIS — E66.9 DIABETES MELLITUS TYPE 2 IN OBESE (HCC): Primary | ICD-10-CM

## 2023-11-15 PROCEDURE — 3044F HG A1C LEVEL LT 7.0%: CPT | Performed by: DIETITIAN, REGISTERED

## 2023-11-15 PROCEDURE — G8417 CALC BMI ABV UP PARAM F/U: HCPCS | Performed by: DIETITIAN, REGISTERED

## 2023-11-15 PROCEDURE — 99024 POSTOP FOLLOW-UP VISIT: CPT | Performed by: SURGERY

## 2023-11-15 PROCEDURE — 97803 MED NUTRITION INDIV SUBSEQ: CPT | Performed by: DIETITIAN, REGISTERED

## 2023-11-15 PROCEDURE — G8428 CUR MEDS NOT DOCUMENT: HCPCS | Performed by: DIETITIAN, REGISTERED

## 2023-11-15 NOTE — PATIENT INSTRUCTIONS
Keep up the hard work! THE BIG FOUR RULES:  1. Count calories! People count calories eat less. Use the daily plate or other apps for your smart phone or computer. The more you count the more of an expert you will become and will get easier and easier. If you are trying to lose weight and exercising a lot, keep calories to around the thousand to 1200 a day. If you are not exercising consistently try to limit them to around 800 a day. 2.  Separate liquids and solids. Wait 30 minutes to an hour after you eat before drinking liquids. This will reduce the total amount of food you eat in the meal.    3.  Exercise! You need up to 5 hours a week with a combination of cardio and resistance or weight training in order to keep excess body fat off.    4.  Sleep! Try to get 7 or more hours of sleep a night. If you have obstructive sleep apnea definitely use your CPAP machine. THE RULE OF 20'S:  For every meal, chew each bite 20 seconds. Then put the fork down and wait 20 seconds after you swallow before picking up the fork again. Each meal should take at least 20 minutes so your brain can register that you are full.

## 2023-11-21 RX ORDER — GABAPENTIN 400 MG/1
400 CAPSULE ORAL 2 TIMES DAILY
Qty: 60 CAPSULE | Refills: 2 | Status: SHIPPED | OUTPATIENT
Start: 2023-11-21 | End: 2024-02-19

## 2023-12-07 ENCOUNTER — OFFICE VISIT (OUTPATIENT)
Dept: BARIATRICS/WEIGHT MGMT | Age: 68
End: 2023-12-07
Payer: COMMERCIAL

## 2023-12-07 ENCOUNTER — OFFICE VISIT (OUTPATIENT)
Dept: BARIATRICS/WEIGHT MGMT | Age: 68
End: 2023-12-07

## 2023-12-07 VITALS
BODY MASS INDEX: 33.64 KG/M2 | DIASTOLIC BLOOD PRESSURE: 62 MMHG | HEART RATE: 70 BPM | OXYGEN SATURATION: 96 % | SYSTOLIC BLOOD PRESSURE: 110 MMHG | HEIGHT: 62 IN | WEIGHT: 182.8 LBS

## 2023-12-07 DIAGNOSIS — Z71.3 DIETARY COUNSELING AND SURVEILLANCE: ICD-10-CM

## 2023-12-07 DIAGNOSIS — E66.09 CLASS 1 OBESITY DUE TO EXCESS CALORIES WITH BODY MASS INDEX (BMI) OF 33.0 TO 33.9 IN ADULT, UNSPECIFIED WHETHER SERIOUS COMORBIDITY PRESENT: Primary | ICD-10-CM

## 2023-12-07 DIAGNOSIS — E78.5 HYPERLIPIDEMIA, UNSPECIFIED HYPERLIPIDEMIA TYPE: ICD-10-CM

## 2023-12-07 DIAGNOSIS — Z98.84 S/P BARIATRIC SURGERY: ICD-10-CM

## 2023-12-07 DIAGNOSIS — K22.70 BARRETT'S ESOPHAGUS DETERMINED BY BIOPSY: ICD-10-CM

## 2023-12-07 DIAGNOSIS — F41.9 ANXIETY DISORDER, UNSPECIFIED TYPE: ICD-10-CM

## 2023-12-07 DIAGNOSIS — J45.40 MODERATE PERSISTENT ASTHMA WITHOUT COMPLICATION: ICD-10-CM

## 2023-12-07 DIAGNOSIS — E78.5 HYPERLIPIDEMIA, UNSPECIFIED HYPERLIPIDEMIA TYPE: Primary | ICD-10-CM

## 2023-12-07 DIAGNOSIS — F50.89 OTHER DISORDER OF EATING: ICD-10-CM

## 2023-12-07 DIAGNOSIS — F32.A DEPRESSION, UNSPECIFIED DEPRESSION TYPE: ICD-10-CM

## 2023-12-07 PROCEDURE — 1123F ACP DISCUSS/DSCN MKR DOCD: CPT | Performed by: PSYCHOLOGIST

## 2023-12-07 PROCEDURE — 99024 POSTOP FOLLOW-UP VISIT: CPT | Performed by: SURGERY

## 2023-12-07 PROCEDURE — 97803 MED NUTRITION INDIV SUBSEQ: CPT | Performed by: DIETITIAN, REGISTERED

## 2023-12-07 PROCEDURE — G8417 CALC BMI ABV UP PARAM F/U: HCPCS | Performed by: DIETITIAN, REGISTERED

## 2023-12-07 PROCEDURE — G8428 CUR MEDS NOT DOCUMENT: HCPCS | Performed by: DIETITIAN, REGISTERED

## 2023-12-07 PROCEDURE — 90832 PSYTX W PT 30 MINUTES: CPT | Performed by: PSYCHOLOGIST

## 2023-12-07 PROCEDURE — 1036F TOBACCO NON-USER: CPT | Performed by: PSYCHOLOGIST

## 2023-12-07 ASSESSMENT — ANXIETY QUESTIONNAIRES
7. FEELING AFRAID AS IF SOMETHING AWFUL MIGHT HAPPEN: 0
1. FEELING NERVOUS, ANXIOUS, OR ON EDGE: 0
GAD7 TOTAL SCORE: 0
4. TROUBLE RELAXING: 0
IF YOU CHECKED OFF ANY PROBLEMS ON THIS QUESTIONNAIRE, HOW DIFFICULT HAVE THESE PROBLEMS MADE IT FOR YOU TO DO YOUR WORK, TAKE CARE OF THINGS AT HOME, OR GET ALONG WITH OTHER PEOPLE: NOT DIFFICULT AT ALL
3. WORRYING TOO MUCH ABOUT DIFFERENT THINGS: 0
5. BEING SO RESTLESS THAT IT IS HARD TO SIT STILL: 0
6. BECOMING EASILY ANNOYED OR IRRITABLE: 0
2. NOT BEING ABLE TO STOP OR CONTROL WORRYING: 0

## 2023-12-07 ASSESSMENT — PATIENT HEALTH QUESTIONNAIRE - PHQ9
7. TROUBLE CONCENTRATING ON THINGS, SUCH AS READING THE NEWSPAPER OR WATCHING TELEVISION: 0
10. IF YOU CHECKED OFF ANY PROBLEMS, HOW DIFFICULT HAVE THESE PROBLEMS MADE IT FOR YOU TO DO YOUR WORK, TAKE CARE OF THINGS AT HOME, OR GET ALONG WITH OTHER PEOPLE: 0
9. THOUGHTS THAT YOU WOULD BE BETTER OFF DEAD, OR OF HURTING YOURSELF: 0
SUM OF ALL RESPONSES TO PHQ QUESTIONS 1-9: 0
6. FEELING BAD ABOUT YOURSELF - OR THAT YOU ARE A FAILURE OR HAVE LET YOURSELF OR YOUR FAMILY DOWN: 0
3. TROUBLE FALLING OR STAYING ASLEEP: 0
4. FEELING TIRED OR HAVING LITTLE ENERGY: 0
2. FEELING DOWN, DEPRESSED OR HOPELESS: 0
1. LITTLE INTEREST OR PLEASURE IN DOING THINGS: 0
5. POOR APPETITE OR OVEREATING: 0
8. MOVING OR SPEAKING SO SLOWLY THAT OTHER PEOPLE COULD HAVE NOTICED. OR THE OPPOSITE, BEING SO FIGETY OR RESTLESS THAT YOU HAVE BEEN MOVING AROUND A LOT MORE THAN USUAL: 0
SUM OF ALL RESPONSES TO PHQ QUESTIONS 1-9: 0
SUM OF ALL RESPONSES TO PHQ9 QUESTIONS 1 & 2: 0

## 2023-12-07 NOTE — PATIENT INSTRUCTIONS
It is possible to gain weight back after surgery; the equation of energy-in vs. energy-out will always apply. Bariatric surgery is a powerful tool that will only be successful in the long term if you use it properly. Returning to old behaviors such as snacking, grazing, drinking carbonated beverages, drinking alcohol, choosing high-calorie foods, eating and drinking at the same time, or even not exercising will have weight gain effects. The good habits you form during the first year will be the foundation that helps you stay successful for the long term. Don't weigh yourself daily; once per week is sufficient! Remember, the number on the scale is not the whole story -your body composition is changing. So it is also important to pay attention to other factors, like how your clothes are fitting, how you feel, etc.    Attend support group meetings, as available. Patients who belong to a support group are most likely to succeed in their weight loss regimen and maintain their weight loss long term. Attend all follow-up appointments: Regular follow-up appointments with the weight management office are important for monitoring your progress, addressing any concerns or challenges, and provide ongoing guidance and support. These appointments can also help you stay motivated and on track with your weight loss goals. attend follow-up appointments: Regular follow-up appointments with the weight management office are important for monitoring your progress, addressing any concerns or challenges, and provide ongoing guidance and support. These appointments can also help you stay motivated and on track with your weight loss goals. Regular exercise is important for maintaining weight loss and improving overall health. Start with low-impact exercises and gradually increase your intensity as your body adapts and heals.  Aim for a well-rounded exercise routine that includes cardiovascular exercises (e.g., walking, swimming,

## 2023-12-07 NOTE — PROGRESS NOTES
BARIATRIC POST-OPERATIVE BEHAVIORAL HEALTH FOLLOW UP  Radhika Mcnair Psy.D., [address] [facility]  Licensed Clinical Psychologist ([place] [id number])        Name: Anila To  Date of Birth 1955  Visit Date: 12/7/2023   Time spent with Patient: 30 minutes. Patient provided informed consent for behavioral health intervention. Discussed with patient the limits of confidentiality (i.e., abuse reporting, suicide intervention, review by treatment team, review by insurance company, etc.) and purpose of treatment. Patient indicated understanding. Corey Nuñez presents for 1 month post-operative follow up with Bariatric Psychologist.    Initial Surgical Consultation 234.2 lbs BMI: 42.15   Pre-surgical Visit 202.2 lbs BMI: 36.98   1 Month Post-op 182.8 lbs BMI: 33.43     Bolling Mohs has lost 51.4 pounds since the initial surgical consultation with Dr. Keyvn Barbosa, and 19.4 pounds since completion of the Mami-en-Y Gastric Bypass procedure. Bolling Mohs reports she is doing well overall and is happy with her results so far. Mood: \"Fine\"  Sleep: \"Alright\"  Medication: continues taking Lexapro as prescribed  Appetite: largely absent    Eating Habits: progressing from puree to soft foods; attempting to eat 3 meals per day  Snacking: Greek yogurt  Water: 20-40 oz +20 oz protein water and decaff tea per day; not meeting daily requirement  Protein: 60+ grams per day; drinking protein water and shake  Vitamins: taking bariatric MVI and calcium citrate as instructed  Alcohol: none  Nicotine: none  Caffeine: none  Activity/Exercise: 4-6 classes at gym per week    Difficulties: experienced a few episodes of dumping, likely related to eating too quickly  Struggles: having hard time meeting fluid requirement      OBJECTIVE  MENTAL STATUS EXAM:  Mood was \"fine\" with Neutral/Euthymic(normal) affect. Suicidal ideation was denied. Homicidal ideation was denied. Hygiene was good . Dress was appropriate.    Behavior was Within Normal Limits

## 2023-12-07 NOTE — PATIENT INSTRUCTIONS
Obtain labs before follow up appointment in 3 months. THE BIG FOUR RULES:  1. Count calories! People count calories eat less. Use the daily plate or other apps for your smart phone or computer. The more you count the more of an expert you will become and will get easier and easier. If you are trying to lose weight and exercising a lot, keep calories to around the thousand to 1200 a day. If you are not exercising consistently try to limit them to around 800 a day. 2.  Separate liquids and solids. Wait 30 minutes to an hour after you eat before drinking liquids. This will reduce the total amount of food you eat in the meal.    3.  Exercise! You need up to 5 hours a week with a combination of cardio and resistance or weight training in order to keep excess body fat off.    4.  Sleep! Try to get 7 or more hours of sleep a night. If you have obstructive sleep apnea definitely use your CPAP machine. THE RULE OF 20'S:  For every meal, chew each bite 20 seconds. Then put the fork down and wait 20 seconds after you swallow before picking up the fork again. Each meal should take at least 20 minutes so your brain can register that you are full. If you are happy with your care, please go  to [employer] and leave a review for [place]. Bakari Nolan MD.  The charitable nature of our practice makes a marketing budget a challenge and your review could help us help more people.

## 2024-02-13 RX ORDER — GABAPENTIN 400 MG/1
400 CAPSULE ORAL 2 TIMES DAILY
Qty: 60 CAPSULE | Refills: 2 | Status: SHIPPED | OUTPATIENT
Start: 2024-02-13 | End: 2024-05-13

## 2024-03-05 DIAGNOSIS — E78.5 HYPERLIPIDEMIA, UNSPECIFIED HYPERLIPIDEMIA TYPE: ICD-10-CM

## 2024-03-05 LAB
ALBUMIN SERPL-MCNC: 4.3 G/DL (ref 3.5–4.6)
ERYTHROCYTE [DISTWIDTH] IN BLOOD BY AUTOMATED COUNT: 14.6 % (ref 11.5–14.5)
HBA1C MFR BLD: 5.5 % (ref 4.8–5.9)
HCT VFR BLD AUTO: 41.9 % (ref 37–47)
HGB BLD-MCNC: 13.2 G/DL (ref 12–16)
IRON % SATURATION: 26 % (ref 20–55)
IRON: 69 UG/DL (ref 37–145)
MCH RBC QN AUTO: 28.6 PG (ref 27–31.3)
MCHC RBC AUTO-ENTMCNC: 31.5 % (ref 33–37)
MCV RBC AUTO: 90.7 FL (ref 79.4–94.8)
PLATELET # BLD AUTO: 179 K/UL (ref 130–400)
RBC # BLD AUTO: 4.62 M/UL (ref 4.2–5.4)
TOTAL IRON BINDING CAPACITY: 269 UG/DL (ref 250–450)
UNSATURATED IRON BINDING CAPACITY: 200 UG/DL (ref 112–347)
VITAMIN B-12: 768 PG/ML (ref 232–1245)
VITAMIN D 25-HYDROXY: 74.8 NG/ML (ref 30–100)
WBC # BLD AUTO: 5.6 K/UL (ref 4.8–10.8)

## 2024-03-06 LAB
MISCELLANEOUS LAB TEST ORDER: NORMAL
WHOPPER PROMPT: NORMAL

## 2024-03-07 LAB
CA-I ADJ PH7.4 SERPL-SCNC: 1.26 MMOL/L (ref 1.09–1.3)
CA-I SERPL ISE-SCNC: 1.22 MMOL/L (ref 1.09–1.3)

## 2024-03-09 LAB — VIT B1 SERPL-MCNC: 13 NMOL/L (ref 4–15)

## 2024-03-15 ENCOUNTER — OFFICE VISIT (OUTPATIENT)
Dept: BARIATRICS/WEIGHT MGMT | Age: 69
End: 2024-03-15

## 2024-03-15 ENCOUNTER — OFFICE VISIT (OUTPATIENT)
Dept: BARIATRICS/WEIGHT MGMT | Age: 69
End: 2024-03-15
Payer: COMMERCIAL

## 2024-03-15 VITALS
BODY MASS INDEX: 29.96 KG/M2 | HEIGHT: 62 IN | HEART RATE: 57 BPM | DIASTOLIC BLOOD PRESSURE: 60 MMHG | SYSTOLIC BLOOD PRESSURE: 104 MMHG | OXYGEN SATURATION: 99 % | WEIGHT: 162.8 LBS

## 2024-03-15 VITALS — WEIGHT: 162.8 LBS | BODY MASS INDEX: 29.96 KG/M2 | HEIGHT: 62 IN

## 2024-03-15 DIAGNOSIS — E66.3 OVERWEIGHT WITH BODY MASS INDEX (BMI) OF 29 TO 29.9 IN ADULT: ICD-10-CM

## 2024-03-15 DIAGNOSIS — Z98.84 S/P BARIATRIC SURGERY: Primary | ICD-10-CM

## 2024-03-15 DIAGNOSIS — F41.9 ANXIETY DISORDER, UNSPECIFIED TYPE: ICD-10-CM

## 2024-03-15 DIAGNOSIS — Z71.3 DIETARY COUNSELING AND SURVEILLANCE: ICD-10-CM

## 2024-03-15 DIAGNOSIS — E66.3 OVERWEIGHT (BMI 25.0-29.9): Primary | ICD-10-CM

## 2024-03-15 DIAGNOSIS — K22.70 BARRETT'S ESOPHAGUS DETERMINED BY BIOPSY: ICD-10-CM

## 2024-03-15 DIAGNOSIS — F32.A DEPRESSION, UNSPECIFIED DEPRESSION TYPE: ICD-10-CM

## 2024-03-15 DIAGNOSIS — Z98.84 S/P GASTRIC BYPASS: Primary | ICD-10-CM

## 2024-03-15 DIAGNOSIS — F50.89 OTHER DISORDER OF EATING: ICD-10-CM

## 2024-03-15 DIAGNOSIS — Z98.84 S/P GASTRIC BYPASS: ICD-10-CM

## 2024-03-15 PROCEDURE — 1036F TOBACCO NON-USER: CPT | Performed by: PSYCHOLOGIST

## 2024-03-15 PROCEDURE — 90832 PSYTX W PT 30 MINUTES: CPT | Performed by: PSYCHOLOGIST

## 2024-03-15 PROCEDURE — 1123F ACP DISCUSS/DSCN MKR DOCD: CPT | Performed by: PSYCHOLOGIST

## 2024-03-15 RX ORDER — LANOLIN ALCOHOL/MO/W.PET/CERES
3 CREAM (GRAM) TOPICAL DAILY
COMMUNITY

## 2024-03-15 RX ORDER — DM/P-EPHED/ACETAMINOPH/DOXYLAM
LIQUID (ML) ORAL
COMMUNITY
Start: 2024-01-28

## 2024-03-15 ASSESSMENT — PATIENT HEALTH QUESTIONNAIRE - PHQ9
SUM OF ALL RESPONSES TO PHQ9 QUESTIONS 1 & 2: 0
6. FEELING BAD ABOUT YOURSELF - OR THAT YOU ARE A FAILURE OR HAVE LET YOURSELF OR YOUR FAMILY DOWN: 0
SUM OF ALL RESPONSES TO PHQ QUESTIONS 1-9: 1
SUM OF ALL RESPONSES TO PHQ QUESTIONS 1-9: 1
3. TROUBLE FALLING OR STAYING ASLEEP: 0
2. FEELING DOWN, DEPRESSED OR HOPELESS: 0
5. POOR APPETITE OR OVEREATING: 1
7. TROUBLE CONCENTRATING ON THINGS, SUCH AS READING THE NEWSPAPER OR WATCHING TELEVISION: 0
1. LITTLE INTEREST OR PLEASURE IN DOING THINGS: 0
4. FEELING TIRED OR HAVING LITTLE ENERGY: 0
SUM OF ALL RESPONSES TO PHQ QUESTIONS 1-9: 1
9. THOUGHTS THAT YOU WOULD BE BETTER OFF DEAD, OR OF HURTING YOURSELF: 0
10. IF YOU CHECKED OFF ANY PROBLEMS, HOW DIFFICULT HAVE THESE PROBLEMS MADE IT FOR YOU TO DO YOUR WORK, TAKE CARE OF THINGS AT HOME, OR GET ALONG WITH OTHER PEOPLE: 0
SUM OF ALL RESPONSES TO PHQ QUESTIONS 1-9: 1
8. MOVING OR SPEAKING SO SLOWLY THAT OTHER PEOPLE COULD HAVE NOTICED. OR THE OPPOSITE, BEING SO FIGETY OR RESTLESS THAT YOU HAVE BEEN MOVING AROUND A LOT MORE THAN USUAL: 0

## 2024-03-15 ASSESSMENT — ANXIETY QUESTIONNAIRES
7. FEELING AFRAID AS IF SOMETHING AWFUL MIGHT HAPPEN: 0
5. BEING SO RESTLESS THAT IT IS HARD TO SIT STILL: 0
IF YOU CHECKED OFF ANY PROBLEMS ON THIS QUESTIONNAIRE, HOW DIFFICULT HAVE THESE PROBLEMS MADE IT FOR YOU TO DO YOUR WORK, TAKE CARE OF THINGS AT HOME, OR GET ALONG WITH OTHER PEOPLE: NOT DIFFICULT AT ALL
GAD7 TOTAL SCORE: 0
6. BECOMING EASILY ANNOYED OR IRRITABLE: 0
2. NOT BEING ABLE TO STOP OR CONTROL WORRYING: 0
1. FEELING NERVOUS, ANXIOUS, OR ON EDGE: 0
3. WORRYING TOO MUCH ABOUT DIFFERENT THINGS: 0
4. TROUBLE RELAXING: 0

## 2024-03-15 NOTE — PROGRESS NOTES
BARIATRIC POST-OPERATIVE BEHAVIORAL HEALTH FOLLOW UP  Sher Gonzales Psy.D., \[facility]\""  Licensed Clinical Psychologist ([id number])        Name: Cele Palmer  Date of Birth 1955  Visit Date: 3/15/2024   Time spent with Patient: 20 minutes.    Patient provided informed consent for behavioral health intervention. Discussed with patient the limits of confidentiality (i.e., abuse reporting, suicide intervention, review by treatment team, review by insurance company, etc.) and purpose of treatment. Patient indicated understanding.      SUBJECTIVE  Cele presents 4 months post- Mami-en-Y Gastric Bypass.    Initial Surgical Consultation 234.2 lbs BMI: 42.15   Pre-surgical Visit 202.2 lbs BMI: 36.98   1 Month Post-op 182.8 lbs BMI: 33.43   4 Months Post-op 162.8 lbs BMI: 29.78     Cele has lost 71.4 pounds since beginning the bariatric program.      Cele reports she is doing very well overall and is pleased with her results.    Mood: \"Good\"  Sleep: \"Great\"  Medication: continues taking Lexapro  Appetite: varied; absent some days    Eating Habits: 3 meals per day, with 1-2 snacks added  Snacking: cheese stick, deli meat slices, sugar-free mint  Fluid: not always meeting daily goal; 40-74 oz of water per day, average ~60 oz  Protein: not always meeting daily goal; 40-70 g per day, average ~60 g per day  Vitamins: taking bariatric MVI and calcium citrate daily  Alcohol: none  Nicotine: none  Caffeine: rarely will have a cup of regular tea  Activity/Exercise: going to gym 4 times per week; [place], strength training, cardio class, and yoga. Plans to include water classes once warmer.      Problems/concerns: noted has been experiencing what she believes to be orthostatic hypotension  Struggles: some struggle meeting daily fluid and protein requirements, but is aware and has plan in place to increase      OBJECTIVE  MENTAL STATUS EXAM:  Mood was \"good\" with Neutral/Euthymic(normal) affect.   Suicidal ideation was denied.

## 2024-03-15 NOTE — PROGRESS NOTES
Medical Nutrition Therapy  [facility]    Patient here for her 1 month post op Mami en Y gastric bypass.       Patient reports: Patient is having difficulty meeting protein and fluid goals, states some days eating and drinking is not appealing. Patient is tracking daily intake, ~400-800kcal/day, 40-70 grams protein/day, 40 oz fluid.    3/15/2024  Height:   Ht Readings from Last 1 Encounters:   12/07/23 1.575 m (5' 2\")     Weight:   Wt Readings from Last 1 Encounters:   12/07/23 82.9 kg (182 lb 12.8 oz)     BMI:   BMI Readings from Last 1 Encounters:   12/07/23 33.43 kg/m²       Pre-Surgery Weight: 201 lbs  TBW lost: 38 lbs  % EBW lost: 61 %    Patient has lost a total of 71 lbs from start of program.    Labs reviewed: yes, WNL    Intolerances/Difficulties:  not consistent with meeting protein and fluid goals , reports episodes of low blood pressure especially when sitting to standing,  has appointment with PCP next week    Vitamins:  Bariatric MVI with iron  and Calcium Citrate 1000-1500mg/day    Protein goal: not met, inconsistent intake, 40-70 grams/day    Fluid goal: not met, 40 oz/day     Exercise: gym 4 days/week, cardio and strength training    Nutrition Diagnosis:  PES: Inadequate protein and fluid intake related to volume restriction s/p weight loss surgery as evidenced by report of 40-70 grams protein/day, 40 oz fluid..    Nutrition Intervention:  Education provided to include review of post surgical diet progression, meal timing, protein and fluid intake, vitamins, and exercise.     Nutrition Monitoring and evaluation:  Protein intake: 60-75 grams/day (1.2-1.5gr/kg IBW)  Fluid intake: 64-90oz sugar free, calorie free, caffeine free, non carbonated beverages  Vitamins: Bariatric MVI with iron,  Calcium citrate 1000-1500mg/day in divided doses  Exercise: 45-60 minutes of exercise 5 days/week, including at least 2 days of strength training    Plan/Recommendation:   Increase meal

## 2024-03-15 NOTE — PROGRESS NOTES
scleral icterus.  Trachea midline.  Thoracic: Clear to auscultation bilaterally.  Cardiac: Regular rate and rhythm with no rubs clicks or murmurs.  Abdomen: The incisions are healing well. No hernias present.    Extremities: No clubbing cyanosis or edema.  Neurologic: No gross motor or sensory defects.      Assessment and Plan:      Cele Palmer   is 4 months  post-op from Robotic Mami en Y gastric bypass.  and doing well.  Labs are normal.  Follow up in 3 months.      I suggested the use of Liquid IV hydration powder in water prior to exercise.      In addition to the plan above, we discussed the following:    THE BIG FOUR GUIDELINES:  1.  Count calories!  People count calories eat less.  Use the daily plate or other apps for your smart phone or computer.  The more you count the more of an expert you will become and will get easier and easier.  If you are trying to lose weight and exercising a lot, keep calories to around the thousand to 1200 a day.  If you are not exercising consistently try to limit them to around 800 a day.    2.  Separate liquids and solids.  Wait 30 minutes to an hour after you eat before drinking liquids.  This will reduce the total amount of food you eat in the meal.    3.  Exercise!  You need up to 5 hours a week with a combination of cardio and resistance or weight training in order to keep excess body fat off.    4.  Sleep!   Try to get 7 or more hours of sleep a night.  If you have obstructive sleep apnea definitely use your CPAP machine.    THE RULE OF 20'S:  For every meal, chew each bite 20 seconds.  Then put the fork down and wait 20 seconds after you swallow before picking up the fork again.  Each meal should take at least 20 minutes so your brain can register that you are full.      More than 40 minutes were spent in face-to-face interaction with patient majority of which were spent in care coordination of the above.      Audi Hammer MD, FACS, [facility]

## 2024-03-15 NOTE — PATIENT INSTRUCTIONS
Keep up the hard work!      THE BIG FOUR GUIDELINES:    1.  Count calories!  People count calories eat less.  Use the daily plate or other apps for your smart phone or computer.  The more you count the more of an expert you will become and will get easier and easier.  If you are trying to lose weight and exercising a lot, keep calories to around the thousand to 1200 a day.  If you are not exercising consistently try to limit them to around 800 a day.    2.  Separate liquids and solids.  Wait 30 minutes to an hour after you eat before drinking liquids.  This will reduce the total amount of food you eat in the meal.    3.  Exercise!  Optimally,  up to 5 hours a week with a combination of cardio and resistance or weight training will dramatically help to keep excess body fat off.  But any bit of exercise makes a big, long-term difference.    4.  Sleep!   Try to get 7 or more hours of sleep a night.  If you have obstructive sleep apnea definitely use your CPAP machine.    THE RULE OF 20'S:  For every meal, chew each bite 20 seconds.  Then put the fork down and wait 20 seconds after you swallow before picking up the fork again.  Each meal should take at least 20 minutes so your brain can register that you are full.    If you are happy with your care, please go  to [email or web address] and leave a review for Anthony aHmmer MD.  The charitable nature of our practice makes a marketing budget a challenge and your review could help us help more people.

## 2024-03-18 DIAGNOSIS — F41.9 ANXIETY AND DEPRESSION: ICD-10-CM

## 2024-03-18 DIAGNOSIS — F32.A ANXIETY AND DEPRESSION: ICD-10-CM

## 2024-03-20 RX ORDER — ESCITALOPRAM OXALATE 20 MG/1
TABLET ORAL
Qty: 90 TABLET | Refills: 3 | Status: SHIPPED | OUTPATIENT
Start: 2024-03-20

## 2024-03-20 NOTE — TELEPHONE ENCOUNTER
Comments:     Last Office Visit (last PCP visit):   11/14/2023    Next Visit Date:  Future Appointments   Date Time Provider Department Center   3/25/2024  9:30 AM Milagros Huang PA-C [place]  [facility]   5/17/2024 10:00 AM Jesusita Fragoso RD [facility]   5/17/2024 10:45 AM Anthony Hammer MD [facility]       **If hasn't been seen in over a year OR hasn't followed up according to last diabetes/ADHD visit, make appointment for patient before sending refill to provider.    Rx requested:  Requested Prescriptions     Pending Prescriptions Disp Refills    escitalopram (LEXAPRO) 20 MG tablet [Pharmacy Med Name: ESCITALOPRAM TABS 20MG] 90 tablet 3     Sig: AT THE START OF THERAPY, TAKE ONE-HALF (1/2) TABLET (10 MG) DAILY FOR 7 DAYS, THEN TAKE 1 TABLET DAILY THEREAFTER (DISCONTINUE CELEXA)

## 2024-03-22 ASSESSMENT — PATIENT HEALTH QUESTIONNAIRE - PHQ9
SUM OF ALL RESPONSES TO PHQ QUESTIONS 1-9: 3
10. IF YOU CHECKED OFF ANY PROBLEMS, HOW DIFFICULT HAVE THESE PROBLEMS MADE IT FOR YOU TO DO YOUR WORK, TAKE CARE OF THINGS AT HOME, OR GET ALONG WITH OTHER PEOPLE: NOT DIFFICULT AT ALL
6. FEELING BAD ABOUT YOURSELF - OR THAT YOU ARE A FAILURE OR HAVE LET YOURSELF OR YOUR FAMILY DOWN: NOT AT ALL
SUM OF ALL RESPONSES TO PHQ QUESTIONS 1-9: 3
5. POOR APPETITE OR OVEREATING: SEVERAL DAYS
SUM OF ALL RESPONSES TO PHQ QUESTIONS 1-9: 3
2. FEELING DOWN, DEPRESSED OR HOPELESS: NOT AT ALL
9. THOUGHTS THAT YOU WOULD BE BETTER OFF DEAD, OR OF HURTING YOURSELF: NOT AT ALL
10. IF YOU CHECKED OFF ANY PROBLEMS, HOW DIFFICULT HAVE THESE PROBLEMS MADE IT FOR YOU TO DO YOUR WORK, TAKE CARE OF THINGS AT HOME, OR GET ALONG WITH OTHER PEOPLE: NOT DIFFICULT AT ALL
3. TROUBLE FALLING OR STAYING ASLEEP: SEVERAL DAYS
6. FEELING BAD ABOUT YOURSELF - OR THAT YOU ARE A FAILURE OR HAVE LET YOURSELF OR YOUR FAMILY DOWN: NOT AT ALL
SUM OF ALL RESPONSES TO PHQ QUESTIONS 1-9: 3
SUM OF ALL RESPONSES TO PHQ9 QUESTIONS 1 & 2: 0
SUM OF ALL RESPONSES TO PHQ QUESTIONS 1-9: 3
8. MOVING OR SPEAKING SO SLOWLY THAT OTHER PEOPLE COULD HAVE NOTICED. OR THE OPPOSITE - BEING SO FIDGETY OR RESTLESS THAT YOU HAVE BEEN MOVING AROUND A LOT MORE THAN USUAL: NOT AT ALL
7. TROUBLE CONCENTRATING ON THINGS, SUCH AS READING THE NEWSPAPER OR WATCHING TELEVISION: NOT AT ALL
4. FEELING TIRED OR HAVING LITTLE ENERGY: SEVERAL DAYS
9. THOUGHTS THAT YOU WOULD BE BETTER OFF DEAD, OR OF HURTING YOURSELF: NOT AT ALL
1. LITTLE INTEREST OR PLEASURE IN DOING THINGS: NOT AT ALL
5. POOR APPETITE OR OVEREATING: SEVERAL DAYS
8. MOVING OR SPEAKING SO SLOWLY THAT OTHER PEOPLE COULD HAVE NOTICED. OR THE OPPOSITE, BEING SO FIGETY OR RESTLESS THAT YOU HAVE BEEN MOVING AROUND A LOT MORE THAN USUAL: NOT AT ALL
1. LITTLE INTEREST OR PLEASURE IN DOING THINGS: NOT AT ALL
2. FEELING DOWN, DEPRESSED OR HOPELESS: NOT AT ALL
7. TROUBLE CONCENTRATING ON THINGS, SUCH AS READING THE NEWSPAPER OR WATCHING TELEVISION: NOT AT ALL
3. TROUBLE FALLING OR STAYING ASLEEP: SEVERAL DAYS
4. FEELING TIRED OR HAVING LITTLE ENERGY: SEVERAL DAYS

## 2024-03-25 ENCOUNTER — TELEPHONE (OUTPATIENT)
Dept: FAMILY MEDICINE CLINIC | Age: 69
End: 2024-03-25

## 2024-03-25 ENCOUNTER — OFFICE VISIT (OUTPATIENT)
Dept: FAMILY MEDICINE CLINIC | Age: 69
End: 2024-03-25
Payer: COMMERCIAL

## 2024-03-25 VITALS
OXYGEN SATURATION: 97 % | HEIGHT: 62 IN | DIASTOLIC BLOOD PRESSURE: 62 MMHG | HEART RATE: 70 BPM | SYSTOLIC BLOOD PRESSURE: 88 MMHG | WEIGHT: 162.92 LBS | RESPIRATION RATE: 16 BRPM | BODY MASS INDEX: 29.98 KG/M2

## 2024-03-25 DIAGNOSIS — R42 DIZZINESS: Primary | ICD-10-CM

## 2024-03-25 DIAGNOSIS — R00.1 BRADYCARDIA: ICD-10-CM

## 2024-03-25 DIAGNOSIS — E78.2 MIXED HYPERLIPIDEMIA: ICD-10-CM

## 2024-03-25 DIAGNOSIS — I95.1 ORTHOSTATIC HYPOTENSION: ICD-10-CM

## 2024-03-25 PROCEDURE — G8417 CALC BMI ABV UP PARAM F/U: HCPCS | Performed by: PHYSICIAN ASSISTANT

## 2024-03-25 PROCEDURE — 93000 ELECTROCARDIOGRAM COMPLETE: CPT | Performed by: PHYSICIAN ASSISTANT

## 2024-03-25 PROCEDURE — 1090F PRES/ABSN URINE INCON ASSESS: CPT | Performed by: PHYSICIAN ASSISTANT

## 2024-03-25 PROCEDURE — G8484 FLU IMMUNIZE NO ADMIN: HCPCS | Performed by: PHYSICIAN ASSISTANT

## 2024-03-25 PROCEDURE — 1123F ACP DISCUSS/DSCN MKR DOCD: CPT | Performed by: PHYSICIAN ASSISTANT

## 2024-03-25 PROCEDURE — 3017F COLORECTAL CA SCREEN DOC REV: CPT | Performed by: PHYSICIAN ASSISTANT

## 2024-03-25 PROCEDURE — G8427 DOCREV CUR MEDS BY ELIG CLIN: HCPCS | Performed by: PHYSICIAN ASSISTANT

## 2024-03-25 PROCEDURE — 99214 OFFICE O/P EST MOD 30 MIN: CPT | Performed by: PHYSICIAN ASSISTANT

## 2024-03-25 PROCEDURE — G8399 PT W/DXA RESULTS DOCUMENT: HCPCS | Performed by: PHYSICIAN ASSISTANT

## 2024-03-25 PROCEDURE — 1036F TOBACCO NON-USER: CPT | Performed by: PHYSICIAN ASSISTANT

## 2024-03-25 RX ORDER — MIDODRINE HYDROCHLORIDE 2.5 MG/1
2.5 TABLET ORAL
Qty: 90 TABLET | Refills: 3 | Status: SHIPPED | OUTPATIENT
Start: 2024-03-25 | End: 2024-03-26 | Stop reason: SDUPTHER

## 2024-03-25 RX ORDER — MIDODRINE HYDROCHLORIDE 2.5 MG/1
2.5 TABLET ORAL 3 TIMES DAILY PRN
Qty: 90 TABLET | Refills: 3 | Status: SHIPPED | OUTPATIENT
Start: 2024-03-25 | End: 2024-03-25

## 2024-03-25 ASSESSMENT — ENCOUNTER SYMPTOMS
SHORTNESS OF BREATH: 0
COLOR CHANGE: 0
NAUSEA: 0
WHEEZING: 0
CHEST TIGHTNESS: 0
CONSTIPATION: 0
BACK PAIN: 0
DIARRHEA: 0
ABDOMINAL DISTENTION: 0
VOMITING: 0
COUGH: 0
RECTAL PAIN: 0
ABDOMINAL PAIN: 0

## 2024-03-25 NOTE — TELEPHONE ENCOUNTER
Pt called and stated that she came in today and her medication Midodrine was sent to [facility].    However, pt stated that [facility] told her that it would be 2 weeks before they can send the medication to her.    Pt stated that Milagros wants her to come into the office in a week to have her bp checked, but she will not be able to because she will not have the medication.    Pt wants to know if the Midodrine can be called in to [facility] in [place].

## 2024-03-25 NOTE — PROGRESS NOTES
alert and oriented to person, place, and time.   Psychiatric:         Mood and Affect: Mood normal.         Behavior: Behavior normal.         Thought Content: Thought content normal.         Judgment: Judgment normal.       Assessment & Plan    Diagnosis Orders   1. Dizziness  EKG 12 Lead - Clinic Performed    EKG 12 Lead - Clinic Performed    Wilson Youssef MD, Cardiology, [place]      2. Orthostatic hypotension  EKG 12 Lead - Clinic Performed    midodrine (PROAMATINE) 2.5 MG tablet    EKG 12 Lead - Clinic Performed    Wilson Youssef MD, Cardiology, [place]    DISCONTINUED: midodrine (PROAMATINE) 2.5 MG tablet      3. Mixed hyperlipidemia  Lipid Panel    Wilson Youssef MD, Cardiology, [place]      4. Bradycardia  Wilson Youssef MD, Cardiology, [place]      Further evaluation with cardiology.  Trial 2.5 mg midodrine in the morning.   1 week BP check.   She would like 3 month follow up with me.       Orders Placed This Encounter   Procedures    Lipid Panel     Standing Status:   Future     Standing Expiration Date:   3/25/2025    Wilson Youssef MD, Cardiology, [place]     Referral Priority:   Routine     Referral Type:   Eval and Treat     Referral Reason:   Specialty Services Required     Referred to Provider:   Wilson Longoria MD     Requested Specialty:   Cardiology     Number of Visits Requested:   1    EKG 12 Lead - Clinic Performed     Standing Status:   Future     Number of Occurrences:   1     Standing Expiration Date:   3/25/2025     Order Specific Question:   Reason for Exam?     Answer:   Hypotension     Orders Placed This Encounter   Medications    DISCONTD: midodrine (PROAMATINE) 2.5 MG tablet     Sig: Take 1 tablet by mouth 3 times daily as needed (orthostatic dizziness.)     Dispense:  90 tablet     Refill:  3    midodrine (PROAMATINE) 2.5 MG tablet     Sig: Take 1 tablet by mouth daily (with breakfast)     Dispense:  90 tablet     Refill:  3     Medications Discontinued During This

## 2024-03-26 RX ORDER — MIDODRINE HYDROCHLORIDE 2.5 MG/1
2.5 TABLET ORAL
Qty: 14 TABLET | Refills: 0 | Status: SHIPPED | OUTPATIENT
Start: 2024-03-26

## 2024-04-10 ENCOUNTER — NURSE ONLY (OUTPATIENT)
Dept: FAMILY MEDICINE CLINIC | Age: 69
End: 2024-04-10

## 2024-04-10 ENCOUNTER — OFFICE VISIT (OUTPATIENT)
Dept: CARDIOLOGY CLINIC | Age: 69
End: 2024-04-10
Payer: COMMERCIAL

## 2024-04-10 ENCOUNTER — TELEPHONE (OUTPATIENT)
Dept: FAMILY MEDICINE CLINIC | Age: 69
End: 2024-04-10

## 2024-04-10 VITALS
HEIGHT: 62 IN | RESPIRATION RATE: 14 BRPM | WEIGHT: 151 LBS | OXYGEN SATURATION: 96 % | SYSTOLIC BLOOD PRESSURE: 92 MMHG | BODY MASS INDEX: 27.79 KG/M2 | DIASTOLIC BLOOD PRESSURE: 64 MMHG | HEART RATE: 56 BPM

## 2024-04-10 VITALS — SYSTOLIC BLOOD PRESSURE: 110 MMHG | DIASTOLIC BLOOD PRESSURE: 76 MMHG

## 2024-04-10 DIAGNOSIS — R00.1 BRADYCARDIA: ICD-10-CM

## 2024-04-10 DIAGNOSIS — I95.1 ORTHOSTATIC HYPOTENSION: Primary | ICD-10-CM

## 2024-04-10 DIAGNOSIS — E78.2 MIXED HYPERLIPIDEMIA: ICD-10-CM

## 2024-04-10 LAB
CHOLEST SERPL-MCNC: 108 MG/DL (ref 0–199)
HDLC SERPL-MCNC: 49 MG/DL (ref 40–59)
LDLC SERPL CALC-MCNC: 49 MG/DL (ref 0–129)
TRIGL SERPL-MCNC: 51 MG/DL (ref 0–150)

## 2024-04-10 PROCEDURE — 1123F ACP DISCUSS/DSCN MKR DOCD: CPT | Performed by: INTERNAL MEDICINE

## 2024-04-10 PROCEDURE — 3017F COLORECTAL CA SCREEN DOC REV: CPT | Performed by: INTERNAL MEDICINE

## 2024-04-10 PROCEDURE — 1090F PRES/ABSN URINE INCON ASSESS: CPT | Performed by: INTERNAL MEDICINE

## 2024-04-10 PROCEDURE — G8399 PT W/DXA RESULTS DOCUMENT: HCPCS | Performed by: INTERNAL MEDICINE

## 2024-04-10 PROCEDURE — 99204 OFFICE O/P NEW MOD 45 MIN: CPT | Performed by: INTERNAL MEDICINE

## 2024-04-10 PROCEDURE — G8427 DOCREV CUR MEDS BY ELIG CLIN: HCPCS | Performed by: INTERNAL MEDICINE

## 2024-04-10 PROCEDURE — G8417 CALC BMI ABV UP PARAM F/U: HCPCS | Performed by: INTERNAL MEDICINE

## 2024-04-10 PROCEDURE — 1036F TOBACCO NON-USER: CPT | Performed by: INTERNAL MEDICINE

## 2024-04-10 ASSESSMENT — ENCOUNTER SYMPTOMS
ABDOMINAL PAIN: 0
VOMITING: 0
DIARRHEA: 0
COUGH: 0
CONSTIPATION: 0
SHORTNESS OF BREATH: 0
APNEA: 0
COLOR CHANGE: 0
NAUSEA: 0
WHEEZING: 0
CHEST TIGHTNESS: 0
EYE REDNESS: 0
RHINORRHEA: 0

## 2024-04-10 NOTE — TELEPHONE ENCOUNTER
Pt in office for BP check  BP readings normal  First check 120/72 right arm  Second check 110/76 right arm

## 2024-04-10 NOTE — PROGRESS NOTES
within normal limits    Return to clinic after echocardiogram and exercise stress test are done    On this date 4/10/2024 I have spent >30 minutes reviewing previous notes, test results and face to face with the patient discussing the diagnoses and importance of compliance with the treatment plan as well as documenting on the day of the visit.      This note was transcribed using voice recognition software.  Every effort was made to ensure accuracy; however, inadvertent computerized transcription errors may be present.

## 2024-05-06 ENCOUNTER — HOSPITAL ENCOUNTER (OUTPATIENT)
Age: 69
Discharge: HOME | End: 2024-05-08
Attending: INTERNAL MEDICINE
Payer: COMMERCIAL

## 2024-05-06 VITALS
SYSTOLIC BLOOD PRESSURE: 92 MMHG | WEIGHT: 151 LBS | HEIGHT: 62 IN | BODY MASS INDEX: 27.79 KG/M2 | DIASTOLIC BLOOD PRESSURE: 64 MMHG

## 2024-05-06 DIAGNOSIS — I95.1 ORTHOSTATIC HYPOTENSION: ICD-10-CM

## 2024-05-06 LAB
ECHO AR MAX VEL PISA: 1.9 M/S
ECHO AV AREA PEAK VELOCITY: 3.3 CM2
ECHO AV AREA VTI: 3.4 CM2
ECHO AV AREA/BSA PEAK VELOCITY: 1.9 CM2/M2
ECHO AV AREA/BSA VTI: 2 CM2/M2
ECHO AV CUSP MM: 1.5 CM
ECHO AV MEAN GRADIENT: 2 MMHG
ECHO AV MEAN VELOCITY: 0.7 M/S
ECHO AV PEAK GRADIENT: 5 MMHG
ECHO AV PEAK VELOCITY: 1.1 M/S
ECHO AV REGURGITANT PHT: 958.3 MILLISECOND
ECHO AV VELOCITY RATIO: 1.09
ECHO AV VTI: 26.3 CM
ECHO BSA: 1.73 M2
ECHO EST RA PRESSURE: 3 MMHG
ECHO LA DIAMETER INDEX: 2.06 CM/M2
ECHO LA DIAMETER: 3.5 CM
ECHO LA VOL A-L A2C: 35 ML (ref 22–52)
ECHO LA VOL A-L A4C: 45 ML (ref 22–52)
ECHO LA VOL MOD A2C: 34 ML (ref 22–52)
ECHO LA VOL MOD A4C: 43 ML (ref 22–52)
ECHO LA VOLUME AREA LENGTH: 41 ML
ECHO LA VOLUME INDEX A-L A2C: 21 ML/M2 (ref 16–34)
ECHO LA VOLUME INDEX AREA LENGTH: 24 ML/M2 (ref 16–34)
ECHO LA VOLUME INDEX MOD A2C: 20 ML/M2 (ref 16–34)
ECHO LA VOLUME INDEX MOD A4C: 25 ML/M2 (ref 16–34)
ECHO LV E' LATERAL VELOCITY: 13 CM/S
ECHO LV E' SEPTAL VELOCITY: 10 CM/S
ECHO LV EDV A2C: 71 ML
ECHO LV EDV A4C: 86 ML
ECHO LV EDV BP: 80 ML (ref 56–104)
ECHO LV EDV INDEX A4C: 51 ML/M2
ECHO LV EDV INDEX BP: 47 ML/M2
ECHO LV EDV NDEX A2C: 42 ML/M2
ECHO LV EJECTION FRACTION A2C: 63 %
ECHO LV EJECTION FRACTION A4C: 67 %
ECHO LV EJECTION FRACTION BIPLANE: 65 % (ref 55–100)
ECHO LV ESV A2C: 27 ML
ECHO LV ESV A4C: 29 ML
ECHO LV ESV BP: 28 ML (ref 19–49)
ECHO LV ESV INDEX A2C: 16 ML/M2
ECHO LV ESV INDEX A4C: 17 ML/M2
ECHO LV ESV INDEX BP: 16 ML/M2
ECHO LV FRACTIONAL SHORTENING: 34 % (ref 28–44)
ECHO LV INTERNAL DIMENSION DIASTOLE INDEX: 2.41 CM/M2
ECHO LV INTERNAL DIMENSION DIASTOLIC: 4.1 CM (ref 3.9–5.3)
ECHO LV INTERNAL DIMENSION SYSTOLIC INDEX: 1.59 CM/M2
ECHO LV INTERNAL DIMENSION SYSTOLIC: 2.7 CM
ECHO LV IVSD: 0.9 CM (ref 0.6–0.9)
ECHO LV MASS 2D: 123 G (ref 67–162)
ECHO LV MASS INDEX 2D: 72.3 G/M2 (ref 43–95)
ECHO LV POSTERIOR WALL DIASTOLIC: 1 CM (ref 0.6–0.9)
ECHO LV POSTERIOR WALL SYSTOLIC: 1.4 CM
ECHO LV RELATIVE WALL THICKNESS RATIO: 0.49
ECHO LVOT AREA: 3.1 CM2
ECHO LVOT AV VTI INDEX: 1.13
ECHO LVOT DIAM: 2 CM
ECHO LVOT MEAN GRADIENT: 2 MMHG
ECHO LVOT PEAK GRADIENT: 6 MMHG
ECHO LVOT PEAK VELOCITY: 1.2 M/S
ECHO LVOT STROKE VOLUME INDEX: 55 ML/M2
ECHO LVOT SV: 93.6 ML
ECHO LVOT VTI: 29.8 CM
ECHO MV A VELOCITY: 0.74 M/S
ECHO MV AREA VTI: 2.9 CM2
ECHO MV E DECELERATION TIME (DT): 266 MS
ECHO MV E VELOCITY: 0.68 M/S
ECHO MV E/A RATIO: 0.92
ECHO MV E/E' LATERAL: 5.23
ECHO MV E/E' RATIO (AVERAGED): 6.02
ECHO MV LVOT VTI INDEX: 1.07
ECHO MV MAX VELOCITY: 0.7 M/S
ECHO MV MEAN GRADIENT: 1 MMHG
ECHO MV MEAN VELOCITY: 0.4 M/S
ECHO MV PEAK GRADIENT: 2 MMHG
ECHO MV VTI: 32 CM
ECHO PV MAX VELOCITY: 1.3 M/S
ECHO PV PEAK GRADIENT: 7 MMHG
ECHO RIGHT VENTRICULAR SYSTOLIC PRESSURE (RVSP): 31 MMHG
ECHO RV INTERNAL DIMENSION: 2.9 CM
ECHO RV TAPSE: 2.3 CM (ref 1.7–?)
ECHO TV REGURGITANT MAX VELOCITY: 2.63 M/S
ECHO TV REGURGITANT PEAK GRADIENT: 28 MMHG

## 2024-05-06 PROCEDURE — 93306 TTE W/DOPPLER COMPLETE: CPT | Performed by: INTERNAL MEDICINE

## 2024-05-06 PROCEDURE — 93017 CV STRESS TEST TRACING ONLY: CPT

## 2024-05-06 PROCEDURE — 93306 TTE W/DOPPLER COMPLETE: CPT

## 2024-05-08 LAB
ECHO BSA: 1.73 M2
STRESS ANGINA INDEX: 0
STRESS BASELINE DIAS BP: 68 MMHG
STRESS BASELINE HR: 51 BPM
STRESS BASELINE SYS BP: 139 MMHG
STRESS ESTIMATED WORKLOAD: 9.3 METS
STRESS EXERCISE DUR MIN: 6 MIN
STRESS EXERCISE DUR SEC: 45 SEC
STRESS PEAK DIAS BP: 70 MMHG
STRESS PEAK SYS BP: 142 MMHG
STRESS PERCENT HR ACHIEVED: 87 %
STRESS POST PEAK HR: 133 BPM
STRESS RATE PRESSURE PRODUCT: NORMAL BPM*MMHG
STRESS SR DUKE TREADMILL SCORE: -3
STRESS ST DEPRESSION: 2 MM
STRESS TARGET HR: 153 BPM

## 2024-05-08 PROCEDURE — 93018 CV STRESS TEST I&R ONLY: CPT | Performed by: INTERNAL MEDICINE

## 2024-05-08 PROCEDURE — 93016 CV STRESS TEST SUPVJ ONLY: CPT | Performed by: INTERNAL MEDICINE

## 2024-05-09 ENCOUNTER — TELEPHONE (OUTPATIENT)
Dept: CARDIOLOGY CLINIC | Age: 69
End: 2024-05-09

## 2024-05-09 NOTE — TELEPHONE ENCOUNTER
----- Message from Dima Diaz MD sent at 5/9/2024 12:03 PM EDT -----  Please arrange clinic follow-up with me as soon as possible to discuss stress test results

## 2024-05-15 ENCOUNTER — OFFICE VISIT (OUTPATIENT)
Dept: CARDIOLOGY CLINIC | Age: 69
End: 2024-05-15
Payer: COMMERCIAL

## 2024-05-15 VITALS
HEART RATE: 59 BPM | HEIGHT: 62 IN | SYSTOLIC BLOOD PRESSURE: 100 MMHG | WEIGHT: 152 LBS | BODY MASS INDEX: 27.97 KG/M2 | OXYGEN SATURATION: 97 % | DIASTOLIC BLOOD PRESSURE: 66 MMHG

## 2024-05-15 DIAGNOSIS — R07.9 CHEST PAIN, UNSPECIFIED TYPE: ICD-10-CM

## 2024-05-15 DIAGNOSIS — R00.1 BRADYCARDIA: Primary | ICD-10-CM

## 2024-05-15 DIAGNOSIS — R07.9 CHEST PAIN, UNSPECIFIED TYPE: Primary | ICD-10-CM

## 2024-05-15 PROCEDURE — 1090F PRES/ABSN URINE INCON ASSESS: CPT | Performed by: INTERNAL MEDICINE

## 2024-05-15 PROCEDURE — G8427 DOCREV CUR MEDS BY ELIG CLIN: HCPCS | Performed by: INTERNAL MEDICINE

## 2024-05-15 PROCEDURE — 1036F TOBACCO NON-USER: CPT | Performed by: INTERNAL MEDICINE

## 2024-05-15 PROCEDURE — G8399 PT W/DXA RESULTS DOCUMENT: HCPCS | Performed by: INTERNAL MEDICINE

## 2024-05-15 PROCEDURE — G8417 CALC BMI ABV UP PARAM F/U: HCPCS | Performed by: INTERNAL MEDICINE

## 2024-05-15 PROCEDURE — 1123F ACP DISCUSS/DSCN MKR DOCD: CPT | Performed by: INTERNAL MEDICINE

## 2024-05-15 PROCEDURE — 3017F COLORECTAL CA SCREEN DOC REV: CPT | Performed by: INTERNAL MEDICINE

## 2024-05-15 PROCEDURE — 99214 OFFICE O/P EST MOD 30 MIN: CPT | Performed by: INTERNAL MEDICINE

## 2024-05-15 ASSESSMENT — ENCOUNTER SYMPTOMS
SHORTNESS OF BREATH: 0
APNEA: 0
ABDOMINAL PAIN: 0
NAUSEA: 0
RHINORRHEA: 0
COUGH: 0
COLOR CHANGE: 0
CONSTIPATION: 0
EYE REDNESS: 0
VOMITING: 0

## 2024-05-15 NOTE — PROGRESS NOTES
Chief Complaint   Patient presents with    Results     Echo/stress/holter         Patient presents for initial medical evaluation. Patient is followed on a regular basis by Milagros Johnson PA-C.     Orthostatic hypotension on midodrine  Hyperlipidemia  Gastric bypass November 2023    Cardiac studies  TTE 5/6/2024 EF 60 to 65% mild TR  Exercise stress test 5/8/2024 patient exercised for 6 minutes and 45 seconds 1 to 2 mm upsloping ST depressions possible ischemia  1 week event recorder average heart rate 57 bpm minimum 41 bpm no pauses greater than 2.5 seconds 15 episodes of atrial tachycardia longest episode of 12 beats  ============  5/15/2024  Exercise stress test 5/8/2024 patient exercised for 6 minutes and 45 seconds 1 to 2 mm upsloping ST depressions possible ischemia  1 week event recorder average heart rate 57 bpm minimum 41 bpm no pauses greater than 2.5 seconds 15 episodes of atrial tachycardia longest episode of 12 beats  Patient reports feeling well denies chest pain or shortness of breath  Patient still exercising      4/10/2024  Referred to our office for management of dizziness  This condition has been going on for a long time as per patient  Patient goes to the gym she does aerobic classes 3-4 times per week  With this activity patient feels dizzy  EKG 3/2024 sinus bradycardia into the 50s with a major AV block  [facility]      Patient Active Problem List   Diagnosis    Hyperlipidemia    Chronic right hip pain    Chronic midline low back pain without sciatica    Osteopenia of right hip    Vitamin D deficiency    Overactive bladder    Anxiety and depression    History of 2019 novel coronavirus disease (COVID-19)    Moderate persistent asthma without complication    Chronic fatigue    Tremor of right hand    RLS (restless legs syndrome)    BMI 35.0-35.9,adult    Abnormal renal function    Gastroesophageal reflux disease without esophagitis    Boston's esophagus  No

## 2024-05-17 ENCOUNTER — OFFICE VISIT (OUTPATIENT)
Dept: BARIATRICS/WEIGHT MGMT | Age: 69
End: 2024-05-17

## 2024-05-17 VITALS
SYSTOLIC BLOOD PRESSURE: 102 MMHG | DIASTOLIC BLOOD PRESSURE: 66 MMHG | HEART RATE: 55 BPM | OXYGEN SATURATION: 98 % | WEIGHT: 152.2 LBS | BODY MASS INDEX: 28.01 KG/M2 | HEIGHT: 62 IN

## 2024-05-17 DIAGNOSIS — Z98.84 S/P GASTRIC BYPASS: Primary | ICD-10-CM

## 2024-05-17 DIAGNOSIS — K22.70 BARRETT'S ESOPHAGUS DETERMINED BY BIOPSY: ICD-10-CM

## 2024-05-17 NOTE — PROGRESS NOTES
Post Op Clinic Visit      Cele Palmer  is 6 months  post-op from Robotic Mami en Y gastric bypass.   Abdominal pain has been absent.        She has lost essentially all of her excess weight. (82 pounds).      HgA1c is normal on no meds.  Other recent bariatric labs were normal.      She has had some orthostatic hypotension for which midodrine alpha-1 agonist was started.      She is compliant with daily MVI and calcium citrate.      Cele has 64 oz liquid a day and about 60-70 grams protein.         Physical Exam:    /66   Pulse 55   Ht 1.575 m (5' 2.01\")   Wt 69 kg (152 lb 3.2 oz)   SpO2 98%   BMI 27.83 kg/m²     General: No acute distress  HEENT: P PERRLA, no scleral icterus.  Trachea midline.  Thoracic: Clear to auscultation bilaterally.  Cardiac: Regular rate and rhythm with no rubs clicks or murmurs.  Abdomen: The incisions are healing well.There are no hernias    Extremities: No clubbing cyanosis or edema.  Neurologic: No gross motor or sensory defects.        Assessment and Plan:      Cele Palmer   is 6 months  post-op from Robotic Mami en Y gastric bypass.  and doing well.  Follow up in 3 months with labs at 12 month visit.    In addition to the plan above, we discussed the following:    THE BIG FOUR GUIDELINES:  1.  Count calories!  People count calories eat less.  Use the daily plate or other apps for your smart phone or computer.  The more you count the more of an expert you will become and will get easier and easier.  If you are trying to lose weight and exercising a lot, keep calories to around the thousand to 1200 a day.  If you are not exercising consistently try to limit them to around 800 a day.    2.  Separate liquids and solids.  Wait 30 minutes to an hour after you eat before drinking liquids.  This will reduce the total amount of food you eat in the meal.    3.  Exercise!  You need up to 5 hours a week with a combination of cardio and resistance or weight training in order to

## 2024-05-17 NOTE — PATIENT INSTRUCTIONS
Follow up in 3 months for your 9 month post op visit.    Remember to schedule repeat upper endoscopy for ivory's esophagus in August or September.    Obtain labs before follow up appointment in 6 months.    THE BIG FOUR GUIDELINES:    1.  Count calories!  People count calories eat less.  Use the daily plate or other apps for your smart phone or computer.  The more you count the more of an expert you will become and will get easier and easier.  If you are trying to lose weight and exercising a lot, keep calories to around the thousand to 1200 a day.  If you are not exercising consistently try to limit them to around 800 a day.    2.  Separate liquids and solids.  Wait 30 minutes to an hour after you eat before drinking liquids.  This will reduce the total amount of food you eat in the meal.    3.  Exercise!  Optimally,  up to 5 hours a week with a combination of cardio and resistance or weight training will dramatically help to keep excess body fat off.  But any bit of exercise makes a big, long-term difference.    4.  Sleep!   Try to get 7 or more hours of sleep a night.  If you have obstructive sleep apnea definitely use your CPAP machine.    THE RULE OF 20'S:  For every meal, chew each bite 20 seconds.  Then put the fork down and wait 20 seconds after you swallow before picking up the fork again.  Each meal should take at least 20 minutes so your brain can register that you are full.    If you are happy with your care, please go  to [email or web address]  and/or [employer] and leave a review for Anthony Hammer MD.  The charitable nature of our practice makes a marketing budget a challenge and your review could help us help more people.

## 2024-05-21 ENCOUNTER — OFFICE VISIT (OUTPATIENT)
Dept: BARIATRICS/WEIGHT MGMT | Age: 69
End: 2024-05-21
Payer: COMMERCIAL

## 2024-05-21 ENCOUNTER — TELEPHONE (OUTPATIENT)
Dept: CARDIOLOGY CLINIC | Age: 69
End: 2024-05-21

## 2024-05-21 VITALS — HEIGHT: 63 IN | BODY MASS INDEX: 26.65 KG/M2 | WEIGHT: 150.4 LBS

## 2024-05-21 DIAGNOSIS — Z71.3 DIETARY COUNSELING AND SURVEILLANCE: ICD-10-CM

## 2024-05-21 DIAGNOSIS — E66.3 OVERWEIGHT (BMI 25.0-29.9): Primary | ICD-10-CM

## 2024-05-21 DIAGNOSIS — Z98.84 S/P GASTRIC BYPASS: ICD-10-CM

## 2024-05-21 PROCEDURE — 97803 MED NUTRITION INDIV SUBSEQ: CPT | Performed by: DIETITIAN, REGISTERED

## 2024-05-21 PROCEDURE — G8417 CALC BMI ABV UP PARAM F/U: HCPCS | Performed by: DIETITIAN, REGISTERED

## 2024-05-21 PROCEDURE — G8428 CUR MEDS NOT DOCUMENT: HCPCS | Performed by: DIETITIAN, REGISTERED

## 2024-05-21 NOTE — PROGRESS NOTES
Medical Nutrition Therapy  [facility]    Patient here for her 6 month post op Mami en Y gastric bypass.         5/21/2024  Height:   Ht Readings from Last 1 Encounters:   05/17/24 1.575 m (5' 2.01\")     Weight:   Wt Readings from Last 1 Encounters:   05/17/24 69 kg (152 lb 3.2 oz)     BMI:   BMI Readings from Last 1 Encounters:   05/17/24 27.83 kg/m²       Pre-Surgery Weight: 201 lbs  TBW lost: 51 lbs  % EBW lost: 82 %    Patient has lost a total of 84 lbs from start of program.    Labs reviewed: yes, WNL (3/2024)    Intolerances/Difficulties:  hair loss, not consistently meeting protein needs, fluid goals also not met    Vitamins:  Bariatric MVI with iron  and Calcium Citrate 1000-1500mg/day    Protein goal: partially met, not consistent, 40-70 grams/day    Fluid goal: not met, 40 oz/day     Exercise: gym 3-4 days/week, cardio and strength training    Nutrition Diagnosis:  PES: Inadequate protein and fluid intake related to volume restriction s/p weight loss surgery as evidenced by report of 40-70 grams protein/day, 40 oz fluid/day, hair loss.    Nutrition Intervention:  Education provided to include review of post surgical diet progression, meal timing, protein and fluid intake, vitamins, and exercise.     Nutrition Monitoring and evaluation:  Protein intake: 60-75 grams/day (1.2-1.5gr/kg IBW)  Fluid intake: 64-90oz sugar free, calorie free, caffeine free, non carbonated beverages  Vitamins: Bariatric MVI with iron,  Calcium citrate 1000-1500mg/day in divided doses  Exercise: 45-60 minutes of exercise 5 days/week, including at least 2 days of strength training    Plan/Recommendation:   Include protein shake/water daily to meet protein goal 75 grams /day  Continue to sip fluids to increase to goal 64 oz/day  Increase fiber intake with addition of 2 servings fruit/day  Continue tracking daily intake    Time spent with patient 30 minutes    Follow up per program protocol    Jesusita PHELAN

## 2024-05-21 NOTE — TELEPHONE ENCOUNTER
Patient stopped into office stating that she needs CMP ordered for her Cardiac CTA. Lab had been ordered at her LOV on 05/15/2024. Explained to patient that the lab had been ordered.

## 2024-05-28 DIAGNOSIS — R07.9 CHEST PAIN, UNSPECIFIED TYPE: ICD-10-CM

## 2024-05-28 LAB
ALBUMIN SERPL-MCNC: 4.2 G/DL (ref 3.5–4.6)
ALP SERPL-CCNC: 94 U/L (ref 40–130)
ALT SERPL-CCNC: 71 U/L (ref 0–33)
ANION GAP SERPL CALCULATED.3IONS-SCNC: 13 MEQ/L (ref 9–15)
AST SERPL-CCNC: 55 U/L (ref 0–35)
BILIRUB SERPL-MCNC: 1.1 MG/DL (ref 0.2–0.7)
BUN SERPL-MCNC: 21 MG/DL (ref 8–23)
CALCIUM SERPL-MCNC: 9.3 MG/DL (ref 8.5–9.9)
CHLORIDE SERPL-SCNC: 103 MEQ/L (ref 95–107)
CO2 SERPL-SCNC: 26 MEQ/L (ref 20–31)
CREAT SERPL-MCNC: 0.68 MG/DL (ref 0.5–0.9)
GLOBULIN SER CALC-MCNC: 2.8 G/DL (ref 2.3–3.5)
GLUCOSE SERPL-MCNC: 79 MG/DL (ref 70–99)
POTASSIUM SERPL-SCNC: 3.3 MEQ/L (ref 3.4–4.9)
PROT SERPL-MCNC: 7 G/DL (ref 6.3–8)
SODIUM SERPL-SCNC: 142 MEQ/L (ref 135–144)

## 2024-06-07 ENCOUNTER — HOSPITAL ENCOUNTER (OUTPATIENT)
Dept: RADIOLOGY | Facility: CLINIC | Age: 69
Discharge: HOME | End: 2024-06-07
Payer: COMMERCIAL

## 2024-06-07 VITALS
HEART RATE: 54 BPM | OXYGEN SATURATION: 98 % | SYSTOLIC BLOOD PRESSURE: 110 MMHG | DIASTOLIC BLOOD PRESSURE: 65 MMHG | RESPIRATION RATE: 20 BRPM

## 2024-06-07 DIAGNOSIS — R07.9 CHEST PAIN, UNSPECIFIED: ICD-10-CM

## 2024-06-07 PROCEDURE — 2550000001 HC RX 255 CONTRASTS: Performed by: INTERNAL MEDICINE

## 2024-06-07 PROCEDURE — 75574 CT ANGIO HRT W/3D IMAGE: CPT

## 2024-06-07 PROCEDURE — 2500000001 HC RX 250 WO HCPCS SELF ADMINISTERED DRUGS (ALT 637 FOR MEDICARE OP): Performed by: INTERNAL MEDICINE

## 2024-06-07 RX ORDER — NITROGLYCERIN 400 UG/1
2 SPRAY ORAL ONCE
Status: COMPLETED | OUTPATIENT
Start: 2024-06-07 | End: 2024-06-07

## 2024-06-07 RX ADMIN — IOHEXOL 80 ML: 350 INJECTION, SOLUTION INTRAVENOUS at 12:12

## 2024-06-07 RX ADMIN — NITROGLYCERIN 2 SPRAY: 400 SPRAY ORAL at 11:46

## 2024-06-07 NOTE — NURSING NOTE
Post CCTA pt denies feeling dizzy or lightheaded, denies headache. Steady on feet, skin warm pink and dry. Pt verbalized understanding of increased water intake x24 hours, educated on side effects of medications. HL removed with tip intact, manual pressure held until hemostasis achieved, 2x2 and coban to site. Pt DC home to self care

## 2024-06-13 DIAGNOSIS — R00.1 BRADYCARDIA: ICD-10-CM

## 2024-06-19 ENCOUNTER — OFFICE VISIT (OUTPATIENT)
Dept: CARDIOLOGY CLINIC | Age: 69
End: 2024-06-19
Payer: COMMERCIAL

## 2024-06-19 VITALS
OXYGEN SATURATION: 96 % | RESPIRATION RATE: 15 BRPM | HEIGHT: 63 IN | BODY MASS INDEX: 26.75 KG/M2 | DIASTOLIC BLOOD PRESSURE: 78 MMHG | HEART RATE: 59 BPM | SYSTOLIC BLOOD PRESSURE: 126 MMHG | WEIGHT: 151 LBS

## 2024-06-19 DIAGNOSIS — R09.89 BRUIT: ICD-10-CM

## 2024-06-19 DIAGNOSIS — I95.1 ORTHOSTATIC HYPOTENSION: Primary | ICD-10-CM

## 2024-06-19 DIAGNOSIS — E78.00 PURE HYPERCHOLESTEROLEMIA: ICD-10-CM

## 2024-06-19 DIAGNOSIS — I73.9 CLAUDICATION (HCC): ICD-10-CM

## 2024-06-19 PROCEDURE — 99214 OFFICE O/P EST MOD 30 MIN: CPT | Performed by: INTERNAL MEDICINE

## 2024-06-19 PROCEDURE — G8399 PT W/DXA RESULTS DOCUMENT: HCPCS | Performed by: INTERNAL MEDICINE

## 2024-06-19 PROCEDURE — 3017F COLORECTAL CA SCREEN DOC REV: CPT | Performed by: INTERNAL MEDICINE

## 2024-06-19 PROCEDURE — G8417 CALC BMI ABV UP PARAM F/U: HCPCS | Performed by: INTERNAL MEDICINE

## 2024-06-19 PROCEDURE — 1090F PRES/ABSN URINE INCON ASSESS: CPT | Performed by: INTERNAL MEDICINE

## 2024-06-19 PROCEDURE — 1036F TOBACCO NON-USER: CPT | Performed by: INTERNAL MEDICINE

## 2024-06-19 PROCEDURE — G8427 DOCREV CUR MEDS BY ELIG CLIN: HCPCS | Performed by: INTERNAL MEDICINE

## 2024-06-19 PROCEDURE — 1123F ACP DISCUSS/DSCN MKR DOCD: CPT | Performed by: INTERNAL MEDICINE

## 2024-06-19 ASSESSMENT — ENCOUNTER SYMPTOMS
APNEA: 0
WHEEZING: 0
SHORTNESS OF BREATH: 0
ABDOMINAL PAIN: 0
COUGH: 0
VOMITING: 0
COLOR CHANGE: 0
CHEST TIGHTNESS: 0
EYE REDNESS: 0
NAUSEA: 0
RHINORRHEA: 0
CONSTIPATION: 0
DIARRHEA: 0

## 2024-06-19 NOTE — PROGRESS NOTES
was transcribed using voice recognition software.  Every effort was made to ensure accuracy; however, inadvertent computerized transcription errors may be present.

## 2024-06-21 ENCOUNTER — APPOINTMENT (OUTPATIENT)
Dept: RADIOLOGY | Facility: CLINIC | Age: 69
End: 2024-06-21
Payer: COMMERCIAL

## 2024-06-25 SDOH — ECONOMIC STABILITY: INCOME INSECURITY: HOW HARD IS IT FOR YOU TO PAY FOR THE VERY BASICS LIKE FOOD, HOUSING, MEDICAL CARE, AND HEATING?: NOT VERY HARD

## 2024-06-25 SDOH — ECONOMIC STABILITY: FOOD INSECURITY: WITHIN THE PAST 12 MONTHS, THE FOOD YOU BOUGHT JUST DIDN'T LAST AND YOU DIDN'T HAVE MONEY TO GET MORE.: NEVER TRUE

## 2024-06-25 SDOH — ECONOMIC STABILITY: FOOD INSECURITY: WITHIN THE PAST 12 MONTHS, YOU WORRIED THAT YOUR FOOD WOULD RUN OUT BEFORE YOU GOT MONEY TO BUY MORE.: NEVER TRUE

## 2024-06-25 SDOH — ECONOMIC STABILITY: TRANSPORTATION INSECURITY
IN THE PAST 12 MONTHS, HAS LACK OF TRANSPORTATION KEPT YOU FROM MEETINGS, WORK, OR FROM GETTING THINGS NEEDED FOR DAILY LIVING?: NO

## 2024-06-28 ENCOUNTER — OFFICE VISIT (OUTPATIENT)
Dept: FAMILY MEDICINE CLINIC | Age: 69
End: 2024-06-28
Payer: COMMERCIAL

## 2024-06-28 VITALS
DIASTOLIC BLOOD PRESSURE: 60 MMHG | WEIGHT: 153 LBS | OXYGEN SATURATION: 97 % | RESPIRATION RATE: 16 BRPM | HEART RATE: 48 BPM | SYSTOLIC BLOOD PRESSURE: 120 MMHG | HEIGHT: 62 IN | BODY MASS INDEX: 28.16 KG/M2

## 2024-06-28 DIAGNOSIS — R74.8 ABNORMAL LIVER ENZYMES: ICD-10-CM

## 2024-06-28 DIAGNOSIS — R00.1 BRADYCARDIA: ICD-10-CM

## 2024-06-28 DIAGNOSIS — R42 DIZZINESS: ICD-10-CM

## 2024-06-28 DIAGNOSIS — N28.9 ABNORMAL RENAL FUNCTION: ICD-10-CM

## 2024-06-28 DIAGNOSIS — I95.9 HYPOTENSION, UNSPECIFIED HYPOTENSION TYPE: ICD-10-CM

## 2024-06-28 DIAGNOSIS — I95.1 ORTHOSTATIC HYPOTENSION: Primary | ICD-10-CM

## 2024-06-28 DIAGNOSIS — E78.00 PURE HYPERCHOLESTEROLEMIA: ICD-10-CM

## 2024-06-28 DIAGNOSIS — Z12.31 BREAST CANCER SCREENING BY MAMMOGRAM: ICD-10-CM

## 2024-06-28 PROBLEM — K59.03 DRUG-INDUCED CONSTIPATION: Status: RESOLVED | Noted: 2023-08-31 | Resolved: 2024-06-28

## 2024-06-28 LAB
ALBUMIN SERPL-MCNC: 4 G/DL (ref 3.5–4.6)
ALP SERPL-CCNC: 103 U/L (ref 40–130)
ALT SERPL-CCNC: 62 U/L (ref 0–33)
ANION GAP SERPL CALCULATED.3IONS-SCNC: 7 MEQ/L (ref 9–15)
AST SERPL-CCNC: 51 U/L (ref 0–35)
BILIRUB DIRECT SERPL-MCNC: <0.2 MG/DL (ref 0–0.4)
BILIRUB INDIRECT SERPL-MCNC: ABNORMAL MG/DL (ref 0–0.6)
BILIRUB SERPL-MCNC: 0.7 MG/DL (ref 0.2–0.7)
BUN SERPL-MCNC: 16 MG/DL (ref 8–23)
CALCIUM SERPL-MCNC: 9.3 MG/DL (ref 8.5–9.9)
CHLORIDE SERPL-SCNC: 104 MEQ/L (ref 95–107)
CHOLEST SERPL-MCNC: 114 MG/DL (ref 0–199)
CO2 SERPL-SCNC: 31 MEQ/L (ref 20–31)
CREAT SERPL-MCNC: 0.59 MG/DL (ref 0.5–0.9)
GLUCOSE SERPL-MCNC: 93 MG/DL (ref 70–99)
HDLC SERPL-MCNC: 51 MG/DL (ref 40–59)
LDL CHOLESTEROL: 53 MG/DL (ref 0–129)
POTASSIUM SERPL-SCNC: 4.1 MEQ/L (ref 3.4–4.9)
PROT SERPL-MCNC: 6.7 G/DL (ref 6.3–8)
SODIUM SERPL-SCNC: 142 MEQ/L (ref 135–144)
TRIGLYCERIDE, FASTING: 48 MG/DL (ref 0–150)

## 2024-06-28 PROCEDURE — G8399 PT W/DXA RESULTS DOCUMENT: HCPCS | Performed by: PHYSICIAN ASSISTANT

## 2024-06-28 PROCEDURE — 1036F TOBACCO NON-USER: CPT | Performed by: PHYSICIAN ASSISTANT

## 2024-06-28 PROCEDURE — 1123F ACP DISCUSS/DSCN MKR DOCD: CPT | Performed by: PHYSICIAN ASSISTANT

## 2024-06-28 PROCEDURE — G8427 DOCREV CUR MEDS BY ELIG CLIN: HCPCS | Performed by: PHYSICIAN ASSISTANT

## 2024-06-28 PROCEDURE — 1090F PRES/ABSN URINE INCON ASSESS: CPT | Performed by: PHYSICIAN ASSISTANT

## 2024-06-28 PROCEDURE — 99214 OFFICE O/P EST MOD 30 MIN: CPT | Performed by: PHYSICIAN ASSISTANT

## 2024-06-28 PROCEDURE — G8417 CALC BMI ABV UP PARAM F/U: HCPCS | Performed by: PHYSICIAN ASSISTANT

## 2024-06-28 PROCEDURE — 3017F COLORECTAL CA SCREEN DOC REV: CPT | Performed by: PHYSICIAN ASSISTANT

## 2024-06-28 RX ORDER — MIDODRINE HYDROCHLORIDE 2.5 MG/1
2.5 TABLET ORAL
Qty: 90 TABLET | Refills: 2 | Status: SHIPPED | OUTPATIENT
Start: 2024-06-28

## 2024-06-28 ASSESSMENT — ENCOUNTER SYMPTOMS
NAUSEA: 0
COUGH: 0
COLOR CHANGE: 0
BACK PAIN: 0
CHEST TIGHTNESS: 0
CONSTIPATION: 0
RECTAL PAIN: 0
ABDOMINAL PAIN: 0
ABDOMINAL DISTENTION: 0
SHORTNESS OF BREATH: 0
WHEEZING: 0
DIARRHEA: 0
VOMITING: 0

## 2024-06-28 NOTE — PROGRESS NOTES
Plan    Diagnosis Orders   1. Abnormal renal function  Basic Metabolic Panel      2. Orthostatic hypotension  midodrine (PROAMATINE) 2.5 MG tablet      3. Dizziness        4. Hypotension, unspecified hypotension type        5. Bradycardia        6. Abnormal liver enzymes  Hepatic Function Panel      7. Pure hypercholesterolemia  Lipid, Fasting      8. Breast cancer screening by mammogram  FRANTZ DIGITAL SCREEN W OR WO CAD BILATERAL      Repeat labs today.  HOLD crestor.    Continue midodrine.   Return in 3 months.     Orders Placed This Encounter   Procedures    FRANTZ DIGITAL SCREEN W OR WO CAD BILATERAL     Standing Status:   Future     Standing Expiration Date:   6/28/2025     Order Specific Question:   Reason for exam:     Answer:   screening    Basic Metabolic Panel     Standing Status:   Future     Number of Occurrences:   1     Standing Expiration Date:   6/28/2025    Hepatic Function Panel     Standing Status:   Future     Number of Occurrences:   1     Standing Expiration Date:   6/28/2025    Lipid, Fasting     Standing Status:   Future     Number of Occurrences:   1     Standing Expiration Date:   6/28/2025     Orders Placed This Encounter   Medications    midodrine (PROAMATINE) 2.5 MG tablet     Sig: Take 1 tablet by mouth daily (with breakfast)     Dispense:  90 tablet     Refill:  2     Medications Discontinued During This Encounter   Medication Reason    midodrine (PROAMATINE) 2.5 MG tablet REORDER     No follow-ups on file.      Reviewed with the patient: current clinical status, medications, activities and diet.     Side effects, adverse effects of the medication prescribed today, as well as treatment plan/ rationale and result expectations have been discussed with the patient who expresses understanding and desires to proceed.    Close follow up to evaluate treatment results and for coordination of care.  I have reviewed the patient's medical history in detail and updated the computerized patient

## 2024-07-12 ENCOUNTER — HOSPITAL ENCOUNTER (OUTPATIENT)
Dept: ULTRASOUND IMAGING | Age: 69
Discharge: HOME | End: 2024-07-12
Attending: INTERNAL MEDICINE
Payer: COMMERCIAL

## 2024-07-12 ENCOUNTER — HOSPITAL ENCOUNTER (OUTPATIENT)
Dept: ULTRASOUND IMAGING | Age: 69
End: 2024-07-12
Attending: INTERNAL MEDICINE
Payer: COMMERCIAL

## 2024-07-12 DIAGNOSIS — R09.89 BRUIT: ICD-10-CM

## 2024-07-12 DIAGNOSIS — I73.9 CLAUDICATION (HCC): ICD-10-CM

## 2024-07-12 PROCEDURE — 93880 EXTRACRANIAL BILAT STUDY: CPT

## 2024-07-12 PROCEDURE — 93922 UPR/L XTREMITY ART 2 LEVELS: CPT

## 2024-07-12 PROCEDURE — 93922 UPR/L XTREMITY ART 2 LEVELS: CPT | Performed by: INTERNAL MEDICINE

## 2024-07-19 ENCOUNTER — HOSPITAL ENCOUNTER (OUTPATIENT)
Dept: WOMENS IMAGING | Age: 69
Discharge: HOME | End: 2024-07-19
Payer: COMMERCIAL

## 2024-07-19 DIAGNOSIS — Z12.31 BREAST CANCER SCREENING BY MAMMOGRAM: ICD-10-CM

## 2024-07-19 PROCEDURE — 77063 BREAST TOMOSYNTHESIS BI: CPT

## 2024-08-07 ENCOUNTER — OFFICE VISIT (OUTPATIENT)
Dept: BARIATRICS/WEIGHT MGMT | Age: 69
End: 2024-08-07
Payer: COMMERCIAL

## 2024-08-07 VITALS
HEART RATE: 53 BPM | WEIGHT: 147.6 LBS | OXYGEN SATURATION: 98 % | DIASTOLIC BLOOD PRESSURE: 60 MMHG | BODY MASS INDEX: 24.59 KG/M2 | HEIGHT: 65 IN | SYSTOLIC BLOOD PRESSURE: 106 MMHG

## 2024-08-07 VITALS — BODY MASS INDEX: 26.17 KG/M2 | HEIGHT: 63 IN | WEIGHT: 147.71 LBS

## 2024-08-07 DIAGNOSIS — Z98.84 S/P GASTRIC BYPASS: Primary | ICD-10-CM

## 2024-08-07 DIAGNOSIS — R10.11 RUQ PAIN: ICD-10-CM

## 2024-08-07 DIAGNOSIS — Z98.84 S/P GASTRIC BYPASS: ICD-10-CM

## 2024-08-07 DIAGNOSIS — Z71.3 DIETARY COUNSELING AND SURVEILLANCE: ICD-10-CM

## 2024-08-07 DIAGNOSIS — E66.3 OVERWEIGHT (BMI 25.0-29.9): Primary | ICD-10-CM

## 2024-08-07 PROCEDURE — G8427 DOCREV CUR MEDS BY ELIG CLIN: HCPCS | Performed by: SURGERY

## 2024-08-07 PROCEDURE — G8417 CALC BMI ABV UP PARAM F/U: HCPCS | Performed by: SURGERY

## 2024-08-07 PROCEDURE — 1036F TOBACCO NON-USER: CPT | Performed by: SURGERY

## 2024-08-07 PROCEDURE — 1090F PRES/ABSN URINE INCON ASSESS: CPT | Performed by: SURGERY

## 2024-08-07 PROCEDURE — G8428 CUR MEDS NOT DOCUMENT: HCPCS | Performed by: DIETITIAN, REGISTERED

## 2024-08-07 PROCEDURE — 97803 MED NUTRITION INDIV SUBSEQ: CPT | Performed by: DIETITIAN, REGISTERED

## 2024-08-07 PROCEDURE — G8417 CALC BMI ABV UP PARAM F/U: HCPCS | Performed by: DIETITIAN, REGISTERED

## 2024-08-07 PROCEDURE — G8399 PT W/DXA RESULTS DOCUMENT: HCPCS | Performed by: SURGERY

## 2024-08-07 PROCEDURE — 3017F COLORECTAL CA SCREEN DOC REV: CPT | Performed by: SURGERY

## 2024-08-07 PROCEDURE — 1123F ACP DISCUSS/DSCN MKR DOCD: CPT | Performed by: SURGERY

## 2024-08-07 PROCEDURE — 99215 OFFICE O/P EST HI 40 MIN: CPT | Performed by: SURGERY

## 2024-08-07 RX ORDER — ESCITALOPRAM OXALATE 20 MG/1
20 TABLET ORAL DAILY
COMMUNITY

## 2024-08-07 NOTE — TELEPHONE ENCOUNTER
Comments:     Last Office Visit (last PCP visit):   6/28/2024    Next Visit Date:  Future Appointments   Date Time Provider Department Center   8/7/2024 11:00 AM Jesusita Fragoso RD [facility] [place]   9/25/2024  9:45 AM Diaz, Dima, MD [place] [facility]   9/30/2024 10:45 AM Milagros Huang PA-C [place] [facility]       **If hasn't been seen in over a year OR hasn't followed up according to last diabetes/ADHD visit, make appointment for patient before sending refill to provider.    Rx requested:  Requested Prescriptions     Pending Prescriptions Disp Refills    pantoprazole (PROTONIX) 40 MG tablet [Pharmacy Med Name: PANTOPRAZOLE SODIUM DR TABS 40MG] 90 tablet 3     Sig: TAKE 1 TABLET EVERY MORNING BEFORE BREAKFAST

## 2024-08-07 NOTE — PROGRESS NOTES
chew each bite 20 seconds.  Then put the fork down and wait 20 seconds after you swallow before picking up the fork again.  Each meal should take at least 20 minutes so your brain can register that you are full.      More than 40 minutes were spent in face-to-face interaction with patient majority of which were spent in care coordination of the above.      Audi Hammer MD, FACS, [facility]

## 2024-08-07 NOTE — PROGRESS NOTES
Medical Nutrition Therapy  [facility]    Patient here for her 9 month post op Mami en Y gastric bypass.       Patient reports:    8/7/2024  Height:   Ht Readings from Last 1 Encounters:   08/07/24 1.657 m (5' 5.25\")     Weight:   Wt Readings from Last 1 Encounters:   08/07/24 67 kg (147 lb 9.6 oz)     BMI:   BMI Readings from Last 1 Encounters:   08/07/24 24.37 kg/m²       Pre-Surgery Weight: 201 lbs  TBW lost: 54 lbs  % EBW lost: 87 %    Patient has lost a total of 87 lbs from start of program.    Labs reviewed: N/A    Intolerances/Difficulties: none    Vitamins:  Bariatric MVI with iron  and Calcium Citrate 1000-1500mg/day    Protein goal: met, 60-80 grams    Fluid goal: met, 60 oz/day     Exercise: gym 3-4 days/week, cardio and strength training    Nutrition Diagnosis:  PES: No nutrition diagnosis at this time   Nutrition Intervention:  Education provided to include review of post surgical diet progression, meal timing, protein and fluid intake, vitamins, and exercise.     Nutrition Monitoring and evaluation:  Protein intake: 60-75 grams/day (1.2-1.5gr/kg IBW)  Fluid intake: 64-90oz sugar free, calorie free, caffeine free, non carbonated beverages  Vitamins: Bariatric MVI with iron,  Calcium citrate 1000-1500mg/day in divided doses  Exercise: 45-60 minutes of exercise 5 days/week, including at least 2 days of strength training    Plan/Recommendation:   Continue current intake  Encourage tracking daily intake at least 3 days/week    Time spent with patient 15 minutes    Follow up per program protocol    Jesusita Fragoso RD

## 2024-08-07 NOTE — PATIENT INSTRUCTIONS
Follow up in 3 months for your 12 month post op visit.    Obtain labs before follow up appointment.    THE BIG FOUR GUIDELINES:    1.  Count calories!  People who count calories eat less.  Use the daily plate or other apps for your smart phone or computer.  The more you count the more of an expert you will become and will get easier and easier.  If you are trying to lose weight and exercising a lot, keep calories to around the thousand to 1200 a day.  If you are not exercising consistently try to limit them to around 800 a day.    2.  Separate liquids and solids.  Wait 30 minutes to an hour after you eat before drinking liquids.  This will reduce the total amount of food you eat in the meal.    3.  Exercise!  Optimally,  up to 5 hours a week with a combination of cardio and resistance or weight training will dramatically help to keep excess body fat off.  But any bit of exercise makes a big, long-term difference.    4.  Sleep!   Try to get 7 or more hours of sleep a night.  If you have obstructive sleep apnea definitely use your CPAP machine.    THE RULE OF 20'S:  For every meal, chew each bite 20 seconds.  Then put the fork down and wait 20 seconds after you swallow before picking up the fork again.  Each meal should take at least 20 minutes so your brain can register that you are full.    If you are happy with your care, please go  to [email or web address]  and/or [employer] and leave a review for Anthony Hammer MD.  The charitable nature of our practice makes a marketing budget a challenge and your review could help us help more people.

## 2024-08-08 RX ORDER — PANTOPRAZOLE SODIUM 40 MG/1
TABLET, DELAYED RELEASE ORAL
Qty: 90 TABLET | Refills: 3 | Status: SHIPPED | OUTPATIENT
Start: 2024-08-08

## 2024-09-30 ENCOUNTER — OFFICE VISIT (OUTPATIENT)
Dept: FAMILY MEDICINE CLINIC | Age: 69
End: 2024-09-30
Payer: COMMERCIAL

## 2024-09-30 VITALS
WEIGHT: 145 LBS | HEIGHT: 63 IN | RESPIRATION RATE: 16 BRPM | HEART RATE: 57 BPM | BODY MASS INDEX: 25.69 KG/M2 | SYSTOLIC BLOOD PRESSURE: 100 MMHG | DIASTOLIC BLOOD PRESSURE: 70 MMHG | OXYGEN SATURATION: 97 %

## 2024-09-30 DIAGNOSIS — R74.8 ABNORMAL LIVER ENZYMES: ICD-10-CM

## 2024-09-30 DIAGNOSIS — Z98.84 S/P GASTRIC BYPASS: ICD-10-CM

## 2024-09-30 DIAGNOSIS — E78.00 PURE HYPERCHOLESTEROLEMIA: ICD-10-CM

## 2024-09-30 DIAGNOSIS — N28.9 ABNORMAL RENAL FUNCTION: ICD-10-CM

## 2024-09-30 DIAGNOSIS — Z98.84 S/P BARIATRIC SURGERY: ICD-10-CM

## 2024-09-30 DIAGNOSIS — N39.46 MIXED STRESS AND URGE URINARY INCONTINENCE: Primary | ICD-10-CM

## 2024-09-30 LAB
ALBUMIN SERPL-MCNC: 4.1 G/DL (ref 3.5–4.6)
ALBUMIN SERPL-MCNC: 4.2 G/DL (ref 3.5–4.6)
ALP SERPL-CCNC: 107 U/L (ref 40–130)
ALT SERPL-CCNC: 34 U/L (ref 0–33)
ANION GAP SERPL CALCULATED.3IONS-SCNC: 9 MEQ/L (ref 9–15)
AST SERPL-CCNC: 34 U/L (ref 0–35)
BILIRUB DIRECT SERPL-MCNC: <0.2 MG/DL (ref 0–0.4)
BILIRUB INDIRECT SERPL-MCNC: ABNORMAL MG/DL (ref 0–0.6)
BILIRUB SERPL-MCNC: 0.8 MG/DL (ref 0.2–0.7)
BUN SERPL-MCNC: 15 MG/DL (ref 8–23)
CALCIUM SERPL-MCNC: 9.9 MG/DL (ref 8.5–9.9)
CHLORIDE SERPL-SCNC: 103 MEQ/L (ref 95–107)
CHOLEST SERPL-MCNC: 173 MG/DL (ref 0–199)
CO2 SERPL-SCNC: 30 MEQ/L (ref 20–31)
CREAT SERPL-MCNC: 0.73 MG/DL (ref 0.5–0.9)
ERYTHROCYTE [DISTWIDTH] IN BLOOD BY AUTOMATED COUNT: 13.7 % (ref 11.5–14.5)
GLUCOSE SERPL-MCNC: 86 MG/DL (ref 70–99)
HCT VFR BLD AUTO: 41.8 % (ref 37–47)
HDLC SERPL-MCNC: 61 MG/DL (ref 40–59)
HGB BLD-MCNC: 13.7 G/DL (ref 12–16)
IRON % SATURATION: 40 % (ref 20–55)
IRON: 125 UG/DL (ref 37–145)
LDL CHOLESTEROL: 97 MG/DL (ref 0–129)
MCH RBC QN AUTO: 30.3 PG (ref 27–31.3)
MCHC RBC AUTO-ENTMCNC: 32.8 % (ref 33–37)
MCV RBC AUTO: 92.5 FL (ref 79.4–94.8)
PLATELET # BLD AUTO: 194 K/UL (ref 130–400)
POTASSIUM SERPL-SCNC: 4.6 MEQ/L (ref 3.4–4.9)
PROT SERPL-MCNC: 7.2 G/DL (ref 6.3–8)
PTH-INTACT SERPL-MCNC: 43.7 PG/ML (ref 15–65)
RBC # BLD AUTO: 4.52 M/UL (ref 4.2–5.4)
SODIUM SERPL-SCNC: 142 MEQ/L (ref 135–144)
TOTAL IRON BINDING CAPACITY: 312 UG/DL (ref 250–450)
TRIGLYCERIDE, FASTING: 74 MG/DL (ref 0–150)
UNSATURATED IRON BINDING CAPACITY: 187 UG/DL (ref 112–347)
VITAMIN B-12: 959 PG/ML (ref 232–1245)
VITAMIN D 25-HYDROXY: 72.7 NG/ML (ref 30–100)
WBC # BLD AUTO: 4.5 K/UL (ref 4.8–10.8)

## 2024-09-30 PROCEDURE — 3017F COLORECTAL CA SCREEN DOC REV: CPT | Performed by: PHYSICIAN ASSISTANT

## 2024-09-30 PROCEDURE — 1036F TOBACCO NON-USER: CPT | Performed by: PHYSICIAN ASSISTANT

## 2024-09-30 PROCEDURE — 1123F ACP DISCUSS/DSCN MKR DOCD: CPT | Performed by: PHYSICIAN ASSISTANT

## 2024-09-30 PROCEDURE — 1090F PRES/ABSN URINE INCON ASSESS: CPT | Performed by: PHYSICIAN ASSISTANT

## 2024-09-30 PROCEDURE — 99214 OFFICE O/P EST MOD 30 MIN: CPT | Performed by: PHYSICIAN ASSISTANT

## 2024-09-30 PROCEDURE — 0509F URINE INCON PLAN DOCD: CPT | Performed by: PHYSICIAN ASSISTANT

## 2024-09-30 PROCEDURE — G8399 PT W/DXA RESULTS DOCUMENT: HCPCS | Performed by: PHYSICIAN ASSISTANT

## 2024-09-30 PROCEDURE — G8427 DOCREV CUR MEDS BY ELIG CLIN: HCPCS | Performed by: PHYSICIAN ASSISTANT

## 2024-09-30 PROCEDURE — G8417 CALC BMI ABV UP PARAM F/U: HCPCS | Performed by: PHYSICIAN ASSISTANT

## 2024-09-30 ASSESSMENT — ENCOUNTER SYMPTOMS
CONSTIPATION: 0
RECTAL PAIN: 0
ABDOMINAL PAIN: 0
COLOR CHANGE: 0
NAUSEA: 0
SHORTNESS OF BREATH: 0
CHEST TIGHTNESS: 0
COUGH: 0
BACK PAIN: 0
WHEEZING: 0
DIARRHEA: 0
VOMITING: 0
ABDOMINAL DISTENTION: 0

## 2024-09-30 NOTE — PROGRESS NOTES
Subjective  Cele Palmer, 69 y.o. female presents today with:  Chief Complaint   Patient presents with    Hypotension     3 month follow up        HPI  Last OV with me: 6/28/24.     Overall, doing well.    No further episodes of dizziness/light-headedness.    Still has some bradycardia, but feeling better.   Remains on midodrine for BP.  Stable.     Having issues with OAB/stress and urge incontinence.  Started patient on ditropan.  No change.  Would like to see someone for symptoms.  She has tried kegals, pelvic floor work.    Once she starts urinating, she cannot stop.   Has incomplete voiding.     Review of Systems   Constitutional:  Negative for activity change, appetite change, chills, diaphoresis, fatigue, fever and unexpected weight change.   HENT:  Negative for congestion.    Respiratory:  Negative for cough, chest tightness, shortness of breath and wheezing.    Cardiovascular:  Negative for chest pain, palpitations and leg swelling.   Gastrointestinal:  Negative for abdominal distention, abdominal pain, constipation, diarrhea, nausea, rectal pain and vomiting.   Genitourinary:  Positive for frequency and urgency. Negative for decreased urine volume and dysuria.   Musculoskeletal:  Positive for arthralgias (intermittent) and gait problem (intermittent). Negative for back pain, joint swelling and myalgias.   Skin:  Negative for color change, rash and wound.   Neurological:  Negative for dizziness, tremors, weakness, light-headedness, numbness and headaches.   Psychiatric/Behavioral:  Negative for agitation, dysphoric mood and sleep disturbance. The patient is not nervous/anxious.        Past Medical History:   Diagnosis Date    Acne     Asthma     Deep vein thrombosis (DVT) (HCC)     right leg    Herniated disc     L4-L5    Hip pain, left     Hyperlipidemia     Kidney stones     Rash and nonspecific skin eruption     TMJ (temporomandibular joint disorder)     Type 2 diabetes mellitus 1/30/2023     Past

## 2024-10-02 LAB
CA-I ADJ PH7.4 SERPL-SCNC: 1.28 MMOL/L (ref 1.09–1.3)
CA-I SERPL ISE-SCNC: 1.26 MMOL/L (ref 1.09–1.3)

## 2024-10-03 LAB — VIT B1 BLD-MCNC: 188 NMOL/L (ref 70–180)

## 2024-10-31 DIAGNOSIS — G25.81 RLS (RESTLESS LEGS SYNDROME): ICD-10-CM

## 2024-10-31 NOTE — TELEPHONE ENCOUNTER
Comments:       Last Office Visit (last PCP visit):   9/30/2024    Next Visit Date:  Future Appointments   Date Time Provider Department Center   11/1/2024 11:30 AM Jesusita Fragoso RD [facility]   11/19/2024 10:30 AM Arabella Gonzalez MD [facility]   11/22/2024 11:30 AM Anthony Hammer MD [facility]   1/6/2025 10:00 AM Milagros Huang PA-C [place] [facility]       **If hasn't been seen in over a year OR hasn't followed up according to last diabetes/ADHD visit, make appointment for patient before sending refill to provider.    Rx requested:  Requested Prescriptions     Pending Prescriptions Disp Refills    pramipexole (MIRAPEX) 0.125 MG tablet [Pharmacy Med Name: PRAMIPEXOLE DIHYDROCHLORIDE TABS 0.125MG] 90 tablet 3     Sig: TAKE 1 TABLET NIGHTLY

## 2024-11-01 ENCOUNTER — OFFICE VISIT (OUTPATIENT)
Dept: BARIATRICS/WEIGHT MGMT | Age: 69
End: 2024-11-01
Payer: COMMERCIAL

## 2024-11-01 VITALS — BODY MASS INDEX: 25.73 KG/M2 | WEIGHT: 145.2 LBS | HEIGHT: 63 IN

## 2024-11-01 DIAGNOSIS — E66.3 OVERWEIGHT (BMI 25.0-29.9): Primary | ICD-10-CM

## 2024-11-01 DIAGNOSIS — Z98.84 S/P GASTRIC BYPASS: ICD-10-CM

## 2024-11-01 DIAGNOSIS — Z71.3 DIETARY COUNSELING AND SURVEILLANCE: ICD-10-CM

## 2024-11-01 PROCEDURE — 97803 MED NUTRITION INDIV SUBSEQ: CPT | Performed by: DIETITIAN, REGISTERED

## 2024-11-01 PROCEDURE — G8417 CALC BMI ABV UP PARAM F/U: HCPCS | Performed by: DIETITIAN, REGISTERED

## 2024-11-01 PROCEDURE — G8428 CUR MEDS NOT DOCUMENT: HCPCS | Performed by: DIETITIAN, REGISTERED

## 2024-11-01 RX ORDER — PRAMIPEXOLE DIHYDROCHLORIDE 0.12 MG/1
0.12 TABLET ORAL NIGHTLY
Qty: 90 TABLET | Refills: 3 | Status: SHIPPED | OUTPATIENT
Start: 2024-11-01

## 2024-11-01 NOTE — PROGRESS NOTES
Medical Nutrition Therapy  [facility]    Patient here for her 1 year post op Mami en Y gastric bypass.         11/1/2024  Height:   Ht Readings from Last 1 Encounters:   09/30/24 1.588 m (5' 2.5\")     Weight:   Wt Readings from Last 1 Encounters:   09/30/24 65.8 kg (145 lb)     BMI:   BMI Readings from Last 1 Encounters:   09/30/24 26.10 kg/m²       Pre-Surgery Weight: 201 lbs  TBW lost: 56 lbs  % EBW lost: 90 %    Patient has lost a total of 89 lbs from start of program.    Labs reviewed: yes, WNL    Intolerances/Difficulties: none    Vitamins:  Bariatric MVI with iron  and Calcium Citrate 1000-1500mg/day    Protein goal: met, 60-80 grams/day    Fluid goal: met, > 64 oz/day     Exercise: gym 5 days/week, cardio and strength training    Nutrition Diagnosis:  PES: No nutrition diagnosis at this time     Nutrition Intervention:  Education provided to include review of post surgical diet progression, meal timing, protein and fluid intake, vitamins, and exercise.     Nutrition Monitoring and evaluation:  Protein intake: 60-75 grams/day (1.2-1.5gr/kg IBW)  Fluid intake: 64-90oz sugar free, calorie free, caffeine free, non carbonated beverages  Vitamins: Bariatric MVI with iron,  Calcium citrate 1000-1500mg/day in divided doses  Exercise: 45-60 minutes of exercise 5 days/week, including 3 days of strength training    Plan/Recommendation:   1.Continue current intake and exercise  2. Encourage to continue tracking daily intake    Time spent with patient 30 minutes    Follow up per program protocol    Jesusita Fragoso RD

## 2024-11-17 ENCOUNTER — PATIENT MESSAGE (OUTPATIENT)
Age: 69
End: 2024-11-17

## 2024-11-17 NOTE — TELEPHONE ENCOUNTER
Comments:     Last Office Visit (last PCP visit):   9/30/2024    Next Visit Date:  Future Appointments   Date Time Provider Department Center   11/19/2024 10:30 AM Arabella Gonzalez MD [facility]   11/22/2024 11:30 AM Anthony Hammer MD [facility]   1/6/2025 10:00 AM Milagros Huang PA-C [facility]   2/17/2025 10:00 AM Jesusita Fragoso RD [facility]         Rx requested:  Requested Prescriptions     Pending Prescriptions Disp Refills    escitalopram (LEXAPRO) 20 MG tablet 90 tablet 2     Sig: Take 1 tablet by mouth daily

## 2024-11-18 RX ORDER — ESCITALOPRAM OXALATE 20 MG/1
TABLET ORAL
Refills: 0 | OUTPATIENT
Start: 2024-11-18

## 2024-11-18 RX ORDER — ESCITALOPRAM OXALATE 20 MG/1
20 TABLET ORAL DAILY
Qty: 90 TABLET | Refills: 2 | Status: SHIPPED | OUTPATIENT
Start: 2024-11-18

## 2024-11-19 ENCOUNTER — OFFICE VISIT (OUTPATIENT)
Dept: OBGYN CLINIC | Age: 69
End: 2024-11-19
Payer: COMMERCIAL

## 2024-11-19 VITALS
SYSTOLIC BLOOD PRESSURE: 114 MMHG | HEART RATE: 59 BPM | BODY MASS INDEX: 26.28 KG/M2 | WEIGHT: 146 LBS | DIASTOLIC BLOOD PRESSURE: 66 MMHG

## 2024-11-19 DIAGNOSIS — N39.46 MIXED STRESS AND URGE URINARY INCONTINENCE: ICD-10-CM

## 2024-11-19 DIAGNOSIS — N39.46 MIXED STRESS AND URGE URINARY INCONTINENCE: Primary | ICD-10-CM

## 2024-11-19 LAB
BILIRUB UR QL STRIP: NEGATIVE
CLARITY UR: CLEAR
COLOR UR: ABNORMAL
GLUCOSE UR STRIP-MCNC: NEGATIVE MG/DL
HGB UR QL STRIP: NEGATIVE
KETONES UR STRIP-MCNC: ABNORMAL MG/DL
LEUKOCYTE ESTERASE UR QL STRIP: NEGATIVE
NITRITE UR QL STRIP: NEGATIVE
PH UR STRIP: 5.5 [PH] (ref 5–9)
PROT UR STRIP-MCNC: NEGATIVE MG/DL
SP GR UR STRIP: 1.02 (ref 1–1.03)
UROBILINOGEN UR STRIP-ACNC: 0.2 E.U./DL

## 2024-11-19 PROCEDURE — 1090F PRES/ABSN URINE INCON ASSESS: CPT | Performed by: OBSTETRICS & GYNECOLOGY

## 2024-11-19 PROCEDURE — 1123F ACP DISCUSS/DSCN MKR DOCD: CPT | Performed by: OBSTETRICS & GYNECOLOGY

## 2024-11-19 PROCEDURE — G8484 FLU IMMUNIZE NO ADMIN: HCPCS | Performed by: OBSTETRICS & GYNECOLOGY

## 2024-11-19 PROCEDURE — 99203 OFFICE O/P NEW LOW 30 MIN: CPT | Performed by: OBSTETRICS & GYNECOLOGY

## 2024-11-19 PROCEDURE — 3017F COLORECTAL CA SCREEN DOC REV: CPT | Performed by: OBSTETRICS & GYNECOLOGY

## 2024-11-19 PROCEDURE — 0509F URINE INCON PLAN DOCD: CPT | Performed by: OBSTETRICS & GYNECOLOGY

## 2024-11-19 PROCEDURE — G8399 PT W/DXA RESULTS DOCUMENT: HCPCS | Performed by: OBSTETRICS & GYNECOLOGY

## 2024-11-19 PROCEDURE — G8427 DOCREV CUR MEDS BY ELIG CLIN: HCPCS | Performed by: OBSTETRICS & GYNECOLOGY

## 2024-11-19 PROCEDURE — G8417 CALC BMI ABV UP PARAM F/U: HCPCS | Performed by: OBSTETRICS & GYNECOLOGY

## 2024-11-19 PROCEDURE — 1036F TOBACCO NON-USER: CPT | Performed by: OBSTETRICS & GYNECOLOGY

## 2024-11-19 RX ORDER — VIBEGRON 75 MG/1
75 TABLET, FILM COATED ORAL DAILY
Qty: 30 TABLET | Refills: 0 | Status: SHIPPED | OUTPATIENT
Start: 2024-11-19

## 2024-11-19 NOTE — PROGRESS NOTES
vibegron (GEMTESA) 75 MG TABS tablet     Sig: Take 1 tablet by mouth daily     Dispense:  30 tablet     Refill:  0       Follow Up:  Return in about 2 weeks (around 12/3/2024) for schedule for UDT and cystoscopy, follow up after to discuss next step. , F/U for results.        Arabella Gonzalez MD

## 2024-11-20 LAB — BACTERIA UR CULT: NORMAL

## 2024-11-22 ENCOUNTER — OFFICE VISIT (OUTPATIENT)
Dept: BARIATRICS/WEIGHT MGMT | Age: 69
End: 2024-11-22
Payer: COMMERCIAL

## 2024-11-22 ENCOUNTER — PREP FOR PROCEDURE (OUTPATIENT)
Dept: BARIATRICS/WEIGHT MGMT | Age: 69
End: 2024-11-22

## 2024-11-22 VITALS
HEART RATE: 52 BPM | SYSTOLIC BLOOD PRESSURE: 100 MMHG | WEIGHT: 146.4 LBS | BODY MASS INDEX: 25.94 KG/M2 | OXYGEN SATURATION: 96 % | HEIGHT: 63 IN | DIASTOLIC BLOOD PRESSURE: 64 MMHG

## 2024-11-22 DIAGNOSIS — K22.70 BARRETT'S ESOPHAGUS DETERMINED BY BIOPSY: Primary | ICD-10-CM

## 2024-11-22 DIAGNOSIS — K22.70 BARRETT'S ESOPHAGUS DETERMINED BY BIOPSY: ICD-10-CM

## 2024-11-22 PROCEDURE — 3017F COLORECTAL CA SCREEN DOC REV: CPT | Performed by: SURGERY

## 2024-11-22 PROCEDURE — 1036F TOBACCO NON-USER: CPT | Performed by: SURGERY

## 2024-11-22 PROCEDURE — 1123F ACP DISCUSS/DSCN MKR DOCD: CPT | Performed by: SURGERY

## 2024-11-22 PROCEDURE — 1090F PRES/ABSN URINE INCON ASSESS: CPT | Performed by: SURGERY

## 2024-11-22 PROCEDURE — G8484 FLU IMMUNIZE NO ADMIN: HCPCS | Performed by: SURGERY

## 2024-11-22 PROCEDURE — G8399 PT W/DXA RESULTS DOCUMENT: HCPCS | Performed by: SURGERY

## 2024-11-22 PROCEDURE — G8427 DOCREV CUR MEDS BY ELIG CLIN: HCPCS | Performed by: SURGERY

## 2024-11-22 PROCEDURE — 99215 OFFICE O/P EST HI 40 MIN: CPT | Performed by: SURGERY

## 2024-11-22 PROCEDURE — G8417 CALC BMI ABV UP PARAM F/U: HCPCS | Performed by: SURGERY

## 2024-11-22 RX ORDER — DOCUSATE SODIUM 100 MG/1
100 CAPSULE, LIQUID FILLED ORAL 2 TIMES DAILY
COMMUNITY

## 2024-11-22 NOTE — PATIENT INSTRUCTIONS
Follow up in 6 months for your 12 month post op visit.    Follow up for upper endoscopy.    THE BIG FOUR GUIDELINES:    1.  Count calories!  People who count calories eat less.  Use the daily plate or other apps for your smart phone or computer.  The more you count the more of an expert you will become and will get easier and easier.  If you are trying to lose weight and exercising a lot, keep calories to around the thousand to 1200 a day.  If you are not exercising consistently try to limit them to around 800 a day.    2.  Separate liquids and solids.  Wait 30 minutes to an hour after you eat before drinking liquids.  This will reduce the total amount of food you eat in the meal.    3.  Exercise!  Optimally,  up to 5 hours a week with a combination of cardio and resistance or weight training will dramatically help to keep excess body fat off.  But any bit of exercise makes a big, long-term difference.    4.  Sleep!   Try to get 7 or more hours of sleep a night.  If you have obstructive sleep apnea definitely use your CPAP machine.    THE RULE OF 20'S:  For every meal, chew each bite 20 seconds.  Then put the fork down and wait 20 seconds after you swallow before picking up the fork again.  Each meal should take at least 20 minutes so your brain can register that you are full.    If you are happy with your care, please go  to [email or web address]  and/or [employer] and leave a review for Anthony Hammer MD.  The charitable nature of our practice makes a marketing budget a challenge and your review could help us help more people.

## 2024-11-22 NOTE — PROGRESS NOTES
Post Op Clinic Visit      Cele Palmer  is 12 months  post-op from Robotic Mami en Y gastric bypass.   Abdominal pain has been absent.        Weight:       Wt Readings from Last 1 Encounters:   09/30/24 65.8 kg (145 lb)      BMI:       BMI Readings from Last 1 Encounters:   09/30/24 26.10 kg/m²         Pre-Surgery Weight: 201 lbs  TBW lost: 56 lbs  % EBW lost: 90 %     Patient has lost a total of 89 lbs from start of program.     Labs reviewed: yes, WNL     Intolerances/Difficulties: none     Vitamins:  Bariatric MVI with iron  and Calcium Citrate 1000-1500mg/day     Protein goal: met, 60-80 grams/day     Fluid goal: met, > 64 oz/day     Exercise: gym 5 days/week, cardio and strength training     Nutrition Diagnosis:  PES: No nutrition diagnosis at this time      Nutrition Intervention:  Education provided to include review of post surgical diet progression, meal timing, protein and fluid intake, vitamins, and exercise.      Nutrition Monitoring and evaluation:  Protein intake: 60-75 grams/day (1.2-1.5gr/kg IBW)  Fluid intake: 64-90oz sugar free, calorie free, caffeine free, non carbonated beverages  Vitamins: Bariatric MVI with iron,  Calcium citrate 1000-1500mg/day in divided doses  Exercise: 45-60 minutes of exercise 5 days/week, including 3 days of strength training     Dietitian Plan/Recommendation:   1.Continue current intake and exercise  2. Encourage to continue tracking daily intake       Physical Exam:    /64   Pulse 52   Ht 1.588 m (5' 2.52\")   Wt 66.4 kg (146 lb 6.4 oz)   SpO2 96%   BMI 26.33 kg/m²     General: No acute distress  HEENT: P PERRLA, no scleral icterus.  Trachea midline.  Thoracic: Clear to auscultation bilaterally.  Cardiac: Regular rate and rhythm with no rubs clicks or murmurs.  Abdomen: The incisions are healing well.There are no hernias    Extremities: No clubbing cyanosis or edema.  Neurologic: No gross motor or sensory defects.      Assessment and Plan:      Cele QUINTANA

## 2024-11-25 ENCOUNTER — NURSE ONLY (OUTPATIENT)
Dept: OBGYN CLINIC | Age: 69
End: 2024-11-25
Payer: COMMERCIAL

## 2024-11-25 VITALS — HEART RATE: 52 BPM | DIASTOLIC BLOOD PRESSURE: 70 MMHG | SYSTOLIC BLOOD PRESSURE: 120 MMHG

## 2024-11-25 DIAGNOSIS — N39.46 MIXED STRESS AND URGE URINARY INCONTINENCE: Primary | ICD-10-CM

## 2024-11-25 PROCEDURE — 51741 ELECTRO-UROFLOWMETRY FIRST: CPT | Performed by: OBSTETRICS & GYNECOLOGY

## 2024-11-25 PROCEDURE — 51797 INTRAABDOMINAL PRESSURE TEST: CPT | Performed by: OBSTETRICS & GYNECOLOGY

## 2024-11-25 PROCEDURE — 51784 ANAL/URINARY MUSCLE STUDY: CPT | Performed by: OBSTETRICS & GYNECOLOGY

## 2024-11-25 PROCEDURE — 51729 CYSTOMETROGRAM W/VP&UP: CPT | Performed by: OBSTETRICS & GYNECOLOGY

## 2024-11-25 NOTE — PROGRESS NOTES
Urodynamics Procedure Note    When pt is brought into the room she is instructed to urinate in the toilet provided in the room with a uroflowmeter. Uroflow is recorded. See scanned report for specific values and details.    Urine collected from the beaker is then tested for infection and found to be negative.     Pt is put into the dorsal lithotomy position and prepped for the procedure. Urinary catheter is placed and a PVR is collected. See scanned report for specific values and details. Tubing for filling study is then connected to cath. Vaginal catheter placed and electrodes for EMG are placed near the anus and on thigh for grounding. Pt tolerated all well.     Pt was then advised that her bladder would be filled with normal saline and that she would need to described three sensations- the first time she felt the need to urinate, again when is was becoming intolerable, and then again when the pt could not hold urine any longer.  Which each sensation the pt was asked to cough and bear down and was monitored for leaking. With last sensation the saline drip was discontinued.  See scanned report for specific values and details. Pt tolerated well.     Pt was then advised that a urethral closure pressure study would be performed.The bladder catheter was pulled out at 1 cm per second until there were at least 2 equivalent readings. See scanned report for specific values and details. Pt tolerated well.     Pt was then helped off the exam table and was instructed to urinate once again for the EMG/CMG with flow. See scanned report for specific values and details. Pt tolerated well.     Study was then complete. Pt was put in the dorsal lithotomy position once again and all catheters and emg electrodes were removed. Pt tolerated well.         Pre Test Void - 45 cc  PVR - 5 cc  Post Test Void - 275 cc

## 2024-12-03 ENCOUNTER — PROCEDURE VISIT (OUTPATIENT)
Dept: OBGYN CLINIC | Age: 69
End: 2024-12-03
Payer: COMMERCIAL

## 2024-12-03 VITALS
HEIGHT: 63 IN | SYSTOLIC BLOOD PRESSURE: 104 MMHG | BODY MASS INDEX: 25.87 KG/M2 | DIASTOLIC BLOOD PRESSURE: 62 MMHG | WEIGHT: 146 LBS | HEART RATE: 56 BPM

## 2024-12-03 DIAGNOSIS — N39.46 MIXED STRESS AND URGE URINARY INCONTINENCE: Primary | ICD-10-CM

## 2024-12-03 PROCEDURE — 52000 CYSTOURETHROSCOPY: CPT | Performed by: OBSTETRICS & GYNECOLOGY

## 2024-12-03 RX ORDER — VIBEGRON 75 MG/1
75 TABLET, FILM COATED ORAL DAILY
Qty: 30 TABLET | Refills: 0 | Status: SHIPPED | OUTPATIENT
Start: 2024-12-03

## 2024-12-03 NOTE — PROGRESS NOTES
Cystoscopy procedure :   Preoperative diagnosis: Microscopic hematuria, urgency  Postop diagnosis: Same  Procedure : diagnostic cystoscopy  Surgeon: Dr. Gonzalez  Anesthesia: None  Findings: Normal bladder mucosa, both ureteral orifices seen  Disposition: Patient tolerated the procedure well.  Patient was stable at the end of the procedure.    Operative technique: Patient in lithotomy position, urethra was scrubbed 3 times with Betadine, the Endo C disposable hysteroscope device was then introduced after applying gel to the tip, and advanced through the urethra with my assistant injecting normal saline continuously through the tip through a 60 cc syringe to be reloaded as needed.  Upon entry into the bladder adequate bladder filling was noted, and visualization was optimal.  I examined all bladder walls sequentially, no lesions or any abnormalities, abnormal vascularity or abnormal plaques were seen on bladder wall.  Both ureteral orifice ease were also observed.  At that point the procedure was complete.  The Endo C device was withdrawn.  Patient tolerated procedure well.  Multiple pictures were taken and will be scanned could be seen in epic.    Plan: see other note.     Arabella Gonzalez M.D., F.A.C.O.G

## 2024-12-04 NOTE — PROGRESS NOTES
Cele Palmer is a 69 y.o. female who presents here today for complaints of Procedure (Cystoscopy. UDT done 11/25/24. Gemtesa f/u; She states it seems to have helped a little bit, she's able to control it a little bit better. She states it's about 75% improvement.)    The patient, Cele Palmer, presents with a chief complaint of urinary incontinence that has been persisting for years and has recently worsened. She reports experiencing involuntary leakage of urine and states that she has no control over her bladder. The patient has tried medications, including Detrol and Myrbetriq, without any improvement in her symptoms. She mentions using 5 pads in a single day due to the severity of her incontinence.    Cele has a history of total hysterectomy performed in 1995. She denies any bulging in the vaginal area. Despite engaging in kegel exercises and core workouts, she has not experienced any relief from her incontinence symptoms.     Last visit Gemtesa 75 mg prescribed , mentions has improved symptoms of urgency and incontinence 65-70% , still leaks and uses pads but significantly less.    UDT findings consistent with mixed incontinence. UDT shows leakage with cough and valsalva, confirming SANDRA.     Patient clinically states that the urge symptoms and sudden urine loss following is what concerns her most , she mentions that leakage with laughing and activity and straining is not as severe as the urge symptoms. Patient believes the gemtesa helped her urge symptoms and the SANDRA isnt significant.     Cystoscopy completed today and found to be with in normal.     Vitals:  /62 (Site: Left Upper Arm, Position: Sitting, Cuff Size: Medium Adult)   Pulse 56   Ht 1.588 m (5' 2.5\")   Wt 66.2 kg (146 lb)   BMI 26.28 kg/m²   Allergies:  Augmentin [amoxicillin-pot clavulanate], Nsaids, Balsam, Cinnamic acide, Linalool, Methylisothiazolinone, and Other  Past Medical History:   Diagnosis Date    Acne     Asthma     Deep

## 2024-12-19 RX ORDER — DOCUSATE SODIUM 100 MG/1
100 CAPSULE, LIQUID FILLED ORAL 2 TIMES DAILY
Qty: 180 CAPSULE | Refills: 3 | OUTPATIENT
Start: 2024-12-19

## 2025-01-03 ASSESSMENT — PATIENT HEALTH QUESTIONNAIRE - PHQ9
5. POOR APPETITE OR OVEREATING: NOT AT ALL
3. TROUBLE FALLING OR STAYING ASLEEP: SEVERAL DAYS
SUM OF ALL RESPONSES TO PHQ QUESTIONS 1-9: 2
9. THOUGHTS THAT YOU WOULD BE BETTER OFF DEAD, OR OF HURTING YOURSELF: NOT AT ALL
9. THOUGHTS THAT YOU WOULD BE BETTER OFF DEAD, OR OF HURTING YOURSELF: NOT AT ALL
6. FEELING BAD ABOUT YOURSELF - OR THAT YOU ARE A FAILURE OR HAVE LET YOURSELF OR YOUR FAMILY DOWN: NOT AT ALL
5. POOR APPETITE OR OVEREATING: NOT AT ALL
1. LITTLE INTEREST OR PLEASURE IN DOING THINGS: NOT AT ALL
1. LITTLE INTEREST OR PLEASURE IN DOING THINGS: NOT AT ALL
6. FEELING BAD ABOUT YOURSELF - OR THAT YOU ARE A FAILURE OR HAVE LET YOURSELF OR YOUR FAMILY DOWN: NOT AT ALL
8. MOVING OR SPEAKING SO SLOWLY THAT OTHER PEOPLE COULD HAVE NOTICED. OR THE OPPOSITE - BEING SO FIDGETY OR RESTLESS THAT YOU HAVE BEEN MOVING AROUND A LOT MORE THAN USUAL: NOT AT ALL
7. TROUBLE CONCENTRATING ON THINGS, SUCH AS READING THE NEWSPAPER OR WATCHING TELEVISION: NOT AT ALL
10. IF YOU CHECKED OFF ANY PROBLEMS, HOW DIFFICULT HAVE THESE PROBLEMS MADE IT FOR YOU TO DO YOUR WORK, TAKE CARE OF THINGS AT HOME, OR GET ALONG WITH OTHER PEOPLE: NOT DIFFICULT AT ALL
3. TROUBLE FALLING OR STAYING ASLEEP: SEVERAL DAYS
2. FEELING DOWN, DEPRESSED OR HOPELESS: NOT AT ALL
SUM OF ALL RESPONSES TO PHQ QUESTIONS 1-9: 2
8. MOVING OR SPEAKING SO SLOWLY THAT OTHER PEOPLE COULD HAVE NOTICED. OR THE OPPOSITE, BEING SO FIGETY OR RESTLESS THAT YOU HAVE BEEN MOVING AROUND A LOT MORE THAN USUAL: NOT AT ALL
7. TROUBLE CONCENTRATING ON THINGS, SUCH AS READING THE NEWSPAPER OR WATCHING TELEVISION: NOT AT ALL
2. FEELING DOWN, DEPRESSED OR HOPELESS: NOT AT ALL
4. FEELING TIRED OR HAVING LITTLE ENERGY: SEVERAL DAYS
4. FEELING TIRED OR HAVING LITTLE ENERGY: SEVERAL DAYS
SUM OF ALL RESPONSES TO PHQ QUESTIONS 1-9: 2
SUM OF ALL RESPONSES TO PHQ9 QUESTIONS 1 & 2: 0
SUM OF ALL RESPONSES TO PHQ QUESTIONS 1-9: 2
10. IF YOU CHECKED OFF ANY PROBLEMS, HOW DIFFICULT HAVE THESE PROBLEMS MADE IT FOR YOU TO DO YOUR WORK, TAKE CARE OF THINGS AT HOME, OR GET ALONG WITH OTHER PEOPLE: NOT DIFFICULT AT ALL
SUM OF ALL RESPONSES TO PHQ QUESTIONS 1-9: 2

## 2025-01-06 ENCOUNTER — OFFICE VISIT (OUTPATIENT)
Dept: OBGYN CLINIC | Age: 70
End: 2025-01-06
Payer: COMMERCIAL

## 2025-01-06 VITALS
WEIGHT: 146 LBS | HEIGHT: 63 IN | SYSTOLIC BLOOD PRESSURE: 118 MMHG | HEART RATE: 60 BPM | DIASTOLIC BLOOD PRESSURE: 64 MMHG | BODY MASS INDEX: 25.87 KG/M2

## 2025-01-06 DIAGNOSIS — N39.41 URGE INCONTINENCE: Primary | ICD-10-CM

## 2025-01-06 DIAGNOSIS — N39.46 MIXED INCONTINENCE: ICD-10-CM

## 2025-01-06 DIAGNOSIS — R82.998 CALCIUM OXALATE CRYSTALS IN URINE: Primary | ICD-10-CM

## 2025-01-06 LAB
BACTERIA URNS QL MICRO: NEGATIVE /HPF
BILIRUB UR QL STRIP: NEGATIVE
CLARITY UR: ABNORMAL
COLOR UR: YELLOW
CRYSTALS URNS MICRO: ABNORMAL /HPF
EPI CELLS #/AREA URNS AUTO: ABNORMAL /HPF (ref 0–5)
GLUCOSE UR STRIP-MCNC: NEGATIVE MG/DL
HGB UR QL STRIP: NEGATIVE
HYALINE CASTS #/AREA URNS AUTO: ABNORMAL /HPF (ref 0–5)
KETONES UR STRIP-MCNC: NEGATIVE MG/DL
LEUKOCYTE ESTERASE UR QL STRIP: ABNORMAL
NITRITE UR QL STRIP: NEGATIVE
PH UR STRIP: 5 [PH] (ref 5–9)
PROT UR STRIP-MCNC: NEGATIVE MG/DL
RBC #/AREA URNS HPF: ABNORMAL /HPF (ref 0–2)
SP GR UR STRIP: 1.02 (ref 1–1.03)
UROBILINOGEN UR STRIP-ACNC: 0.2 E.U./DL
WBC #/AREA URNS AUTO: ABNORMAL /HPF (ref 0–5)

## 2025-01-06 PROCEDURE — 99214 OFFICE O/P EST MOD 30 MIN: CPT | Performed by: OBSTETRICS & GYNECOLOGY

## 2025-01-06 PROCEDURE — 1090F PRES/ABSN URINE INCON ASSESS: CPT | Performed by: OBSTETRICS & GYNECOLOGY

## 2025-01-06 PROCEDURE — 3017F COLORECTAL CA SCREEN DOC REV: CPT | Performed by: OBSTETRICS & GYNECOLOGY

## 2025-01-06 PROCEDURE — G8427 DOCREV CUR MEDS BY ELIG CLIN: HCPCS | Performed by: OBSTETRICS & GYNECOLOGY

## 2025-01-06 PROCEDURE — G8399 PT W/DXA RESULTS DOCUMENT: HCPCS | Performed by: OBSTETRICS & GYNECOLOGY

## 2025-01-06 PROCEDURE — 0509F URINE INCON PLAN DOCD: CPT | Performed by: OBSTETRICS & GYNECOLOGY

## 2025-01-06 PROCEDURE — 1036F TOBACCO NON-USER: CPT | Performed by: OBSTETRICS & GYNECOLOGY

## 2025-01-06 PROCEDURE — 1123F ACP DISCUSS/DSCN MKR DOCD: CPT | Performed by: OBSTETRICS & GYNECOLOGY

## 2025-01-06 PROCEDURE — G8417 CALC BMI ABV UP PARAM F/U: HCPCS | Performed by: OBSTETRICS & GYNECOLOGY

## 2025-01-06 RX ORDER — NITROFURANTOIN 25; 75 MG/1; MG/1
100 CAPSULE ORAL 2 TIMES DAILY
Qty: 20 CAPSULE | Refills: 0 | Status: SHIPPED | OUTPATIENT
Start: 2025-01-06 | End: 2025-01-16

## 2025-01-06 NOTE — PROGRESS NOTES
from outside of the body. InterStim™  batteries last from three to six years. You may need to be seen in the office occasionally to have your  InterStim™ reprogrammed based on changes in your urinary symptoms.    Patient was seen with total face to face time of 30 minutes. More than 50% of this visit was counseling and education regarding The primary encounter diagnosis was Urge incontinence. A diagnosis of Mixed incontinence was also pertinent to this visit. and Follow-up (Gemtesa.)   as well as  counseling on preventative health maintenance follow-up.  Orders Placed This Encounter   Procedures    Culture, Urine     Standing Status:   Future     Standing Expiration Date:   1/6/2026     Order Specific Question:   Specify (ex-cath, midstream, cysto, etc)?     Answer:   midstream    Urinalysis     Standing Status:   Future     Standing Expiration Date:   1/6/2026     No orders of the defined types were placed in this encounter.      Follow Up:  Return for schedule for PNE.      The patient (or guardian, if applicable) and other individuals in attendance with the patient were advised that Artificial Intelligence will be utilized during this visit to record, process the conversation to generate a clinical note, and support improvement of the AI technology. The patient (or guardian, if applicable) and other individuals in attendance at the appointment consented to the use of AI, including the recording.      Arabella Gonzalez MD

## 2025-01-07 ENCOUNTER — ANESTHESIA EVENT (OUTPATIENT)
Dept: ENDOSCOPY | Age: 70
End: 2025-01-07
Payer: COMMERCIAL

## 2025-01-07 LAB — BACTERIA UR CULT: NORMAL

## 2025-01-07 RX ORDER — SODIUM CHLORIDE 9 MG/ML
INJECTION, SOLUTION INTRAVENOUS PRN
Status: CANCELLED | OUTPATIENT
Start: 2025-01-07

## 2025-01-07 RX ORDER — SODIUM CHLORIDE 0.9 % (FLUSH) 0.9 %
5-40 SYRINGE (ML) INJECTION EVERY 12 HOURS SCHEDULED
Status: CANCELLED | OUTPATIENT
Start: 2025-01-07

## 2025-01-07 RX ORDER — SODIUM CHLORIDE 9 MG/ML
INJECTION, SOLUTION INTRAVENOUS CONTINUOUS
Status: CANCELLED | OUTPATIENT
Start: 2025-01-07

## 2025-01-07 RX ORDER — DOCUSATE SODIUM 100 MG/1
100 CAPSULE, LIQUID FILLED ORAL 2 TIMES DAILY
Qty: 30 CAPSULE | Refills: 1 | Status: SHIPPED | OUTPATIENT
Start: 2025-01-07

## 2025-01-07 RX ORDER — SODIUM CHLORIDE 0.9 % (FLUSH) 0.9 %
5-40 SYRINGE (ML) INJECTION PRN
Status: CANCELLED | OUTPATIENT
Start: 2025-01-07

## 2025-01-07 NOTE — ANESTHESIA PRE PROCEDURE
Department of Anesthesiology  Preprocedure Note       Name:  Cele Palmer   Age:  69 y.o.  :  1955                                          MRN:  [id number]         Date:  2025      Surgeon: Surgeon(s):  Anthony Hammer MD    Procedure: Procedure(s):  EGD ESOPHAGOGASTRODUODENOSCOPY    Medications prior to admission:   Prior to Admission medications    Medication Sig Start Date End Date Taking? Authorizing Provider   docusate sodium (COLACE) 100 MG capsule Take 1 capsule by mouth 2 times daily 25   Milagros Huang PA-C   nitrofurantoin, macrocrystal-monohydrate, (MACROBID) 100 MG capsule Take 1 capsule by mouth 2 times daily for 10 days 25  Arabella Gonzalez MD   vibegron (GEMTESA) 75 MG TABS tablet Take 1 tablet by mouth daily 12/3/24   Arabella Gonzalez MD   vibegron (GEMTESA) 75 MG TABS tablet Take 1 tablet by mouth daily 24   Arabella Gonzalez MD   escitalopram (LEXAPRO) 20 MG tablet Take 1 tablet by mouth daily 24   Milagros Huang PA-C   pramipexole (MIRAPEX) 0.125 MG tablet TAKE 1 TABLET NIGHTLY 24   Milagros Huang PA-C   pantoprazole (PROTONIX) 40 MG tablet TAKE 1 TABLET EVERY MORNING BEFORE BREAKFAST 24   Milagros Huang PA-C   midodrine (PROAMATINE) 2.5 MG tablet Take 1 tablet by mouth daily (with breakfast) 24   Milagros Huang PA-C   D 5000 125 MCG (5000 UT) CAPS capsule  24   ProviderIngrid MD   Multiple Vitamins-Minerals (BARIATRIC MULTIVITAMINS/IRON PO) Take by mouth    Ingrid Sanchez MD   CALCIUM PO Take by mouth    Ingrid Sanchez MD   melatonin 3 MG TABS tablet Take 1 tablet by mouth daily    Ingrid Sanchez MD       Current medications:    Current Facility-Administered Medications   Medication Dose Route Frequency Provider Last Rate Last Admin    lidocaine-EPINEPHrine 1 %-1:[id number] injection 20 mL  20 mL IntraDERmal Once Milagros Huang PA-C         Current Outpatient Medications   Medication Sig Dispense Refill

## 2025-01-08 ENCOUNTER — HOSPITAL ENCOUNTER (OUTPATIENT)
Age: 70
Setting detail: OUTPATIENT SURGERY
Discharge: HOME | End: 2025-01-08
Attending: SURGERY | Admitting: SURGERY
Payer: COMMERCIAL

## 2025-01-08 ENCOUNTER — ANESTHESIA (OUTPATIENT)
Dept: ENDOSCOPY | Age: 70
End: 2025-01-08
Payer: COMMERCIAL

## 2025-01-08 VITALS
WEIGHT: 137 LBS | DIASTOLIC BLOOD PRESSURE: 53 MMHG | BODY MASS INDEX: 25.21 KG/M2 | OXYGEN SATURATION: 97 % | TEMPERATURE: 97.1 F | HEIGHT: 62 IN | SYSTOLIC BLOOD PRESSURE: 95 MMHG | HEART RATE: 56 BPM | RESPIRATION RATE: 18 BRPM

## 2025-01-08 DIAGNOSIS — K22.70 BARRETT'S ESOPHAGUS DETERMINED BY BIOPSY: ICD-10-CM

## 2025-01-08 PROCEDURE — 2709999900 HC NON-CHARGEABLE SUPPLY: Performed by: SURGERY

## 2025-01-08 PROCEDURE — 88305 TISSUE EXAM BY PATHOLOGIST: CPT

## 2025-01-08 PROCEDURE — 7100000011 HC PHASE II RECOVERY - ADDTL 15 MIN: Performed by: SURGERY

## 2025-01-08 PROCEDURE — 3609017100 HC EGD: Performed by: SURGERY

## 2025-01-08 PROCEDURE — 3700000001 HC ADD 15 MINUTES (ANESTHESIA): Performed by: SURGERY

## 2025-01-08 PROCEDURE — 3700000000 HC ANESTHESIA ATTENDED CARE: Performed by: SURGERY

## 2025-01-08 PROCEDURE — 2500000003 HC RX 250 WO HCPCS: Performed by: SURGERY

## 2025-01-08 PROCEDURE — 6360000002 HC RX W HCPCS: Performed by: REGISTERED NURSE

## 2025-01-08 PROCEDURE — 99213 OFFICE O/P EST LOW 20 MIN: CPT | Performed by: SURGERY

## 2025-01-08 PROCEDURE — 7100000010 HC PHASE II RECOVERY - FIRST 15 MIN: Performed by: SURGERY

## 2025-01-08 RX ORDER — SODIUM CHLORIDE 9 MG/ML
INJECTION, SOLUTION INTRAVENOUS PRN
Status: DISCONTINUED | OUTPATIENT
Start: 2025-01-08 | End: 2025-01-08 | Stop reason: HOSPADM

## 2025-01-08 RX ORDER — GLYCOPYRROLATE 1 MG/5 ML
SYRINGE (ML) INTRAVENOUS
Status: DISCONTINUED | OUTPATIENT
Start: 2025-01-08 | End: 2025-01-08 | Stop reason: SDUPTHER

## 2025-01-08 RX ORDER — SODIUM CHLORIDE 0.9 % (FLUSH) 0.9 %
5-40 SYRINGE (ML) INJECTION PRN
Status: DISCONTINUED | OUTPATIENT
Start: 2025-01-08 | End: 2025-01-08 | Stop reason: HOSPADM

## 2025-01-08 RX ORDER — DOCUSATE SODIUM 100 MG/1
100 CAPSULE, LIQUID FILLED ORAL 2 TIMES DAILY
Qty: 90 CAPSULE | Refills: 3 | Status: SHIPPED | OUTPATIENT
Start: 2025-01-08 | End: 2025-07-07

## 2025-01-08 RX ORDER — LIDOCAINE HYDROCHLORIDE 20 MG/ML
INJECTION, SOLUTION INFILTRATION; PERINEURAL
Status: DISCONTINUED | OUTPATIENT
Start: 2025-01-08 | End: 2025-01-08 | Stop reason: SDUPTHER

## 2025-01-08 RX ORDER — PROPOFOL 10 MG/ML
INJECTION, EMULSION INTRAVENOUS
Status: DISCONTINUED | OUTPATIENT
Start: 2025-01-08 | End: 2025-01-08 | Stop reason: SDUPTHER

## 2025-01-08 RX ORDER — SODIUM CHLORIDE 0.9 % (FLUSH) 0.9 %
5-40 SYRINGE (ML) INJECTION EVERY 12 HOURS SCHEDULED
Status: DISCONTINUED | OUTPATIENT
Start: 2025-01-08 | End: 2025-01-08 | Stop reason: HOSPADM

## 2025-01-08 RX ORDER — SODIUM CHLORIDE 9 MG/ML
INJECTION, SOLUTION INTRAVENOUS CONTINUOUS
Status: DISCONTINUED | OUTPATIENT
Start: 2025-01-08 | End: 2025-01-08 | Stop reason: HOSPADM

## 2025-01-08 RX ADMIN — PROPOFOL 150 MG: 10 INJECTION, EMULSION INTRAVENOUS at 10:45

## 2025-01-08 RX ADMIN — PROPOFOL 50 MG: 10 INJECTION, EMULSION INTRAVENOUS at 10:50

## 2025-01-08 RX ADMIN — PROPOFOL 50 MG: 10 INJECTION, EMULSION INTRAVENOUS at 10:57

## 2025-01-08 RX ADMIN — PROPOFOL 50 MG: 10 INJECTION, EMULSION INTRAVENOUS at 11:02

## 2025-01-08 RX ADMIN — PROPOFOL 50 MG: 10 INJECTION, EMULSION INTRAVENOUS at 11:05

## 2025-01-08 RX ADMIN — PROPOFOL 50 MG: 10 INJECTION, EMULSION INTRAVENOUS at 10:52

## 2025-01-08 RX ADMIN — LIDOCAINE HYDROCHLORIDE 60 MG: 20 INJECTION, SOLUTION INFILTRATION; PERINEURAL at 10:45

## 2025-01-08 RX ADMIN — Medication 0.2 MG: at 10:45

## 2025-01-08 ASSESSMENT — PAIN - FUNCTIONAL ASSESSMENT: PAIN_FUNCTIONAL_ASSESSMENT: 0-10

## 2025-01-08 NOTE — ANESTHESIA POSTPROCEDURE EVALUATION
Department of Anesthesiology  Postprocedure Note    Patient: Cele Palmer  MRN: [id number]  YOB: 1955  Date of evaluation: 1/8/2025    Procedure Summary       Date: 01/08/25 Room / Location: [facility] OR 03 / [facility]    Anesthesia Start: 1042 Anesthesia Stop: 1106    Procedure: EGD ESOPHAGOGASTRODUODENOSCOPY with biopsies Diagnosis:       Boston's esophagus determined by biopsy      (Boston's esophagus determined by biopsy [K22.70])    Surgeons: Anthony Hammer MD Responsible Provider: Wilton Collins APRN - CRNA    Anesthesia Type: MAC ASA Status: 3            Anesthesia Type: No value filed.    Yesi Phase I: Yesi Score: 10    Yesi Phase II:      Anesthesia Post Evaluation    Patient location during evaluation: bedside  Patient participation: complete - patient participated  Level of consciousness: awake and awake and alert  Airway patency: patent  Nausea & Vomiting: no nausea and no vomiting  Cardiovascular status: blood pressure returned to baseline and hemodynamically stable  Respiratory status: acceptable  Hydration status: euvolemic  Pain management: adequate        No notable events documented.

## 2025-01-08 NOTE — H&P
History and Physical    Patient Name:  :  Hospital Day:   Cele Palmer 1955  LOS: 0 days      Date of Service: 2025    Subjective:      History of Present Illness:  Cele presents today for annual Boston's esophagus screening.  She is 14 months s/p gastric bypass to stop reflux and has no reflux sx.  She has lost 58 pounds and all her excess weight.    Past Medical History:   Diagnosis Date    Acne     Asthma     Deep vein thrombosis (DVT) (HCC)     right leg    Depression     Herniated disc     L4-L5    Hip pain, left     Hyperlipidemia     Kidney stones     Overactive bladder     For yrs    Rash and nonspecific skin eruption     TMJ (temporomandibular joint disorder)     Type 2 diabetes mellitus 2023    Past Surgical History:   Procedure Laterality Date    BUNIONECTOMY Right 2019     SECTION      CHOLECYSTECTOMY, LAPAROSCOPIC N/A 2021    LAPAROSCOPIC CHOLECYSTECTOMY  WITH CHOLANGIOGRAMS performed by Bal Javier [place] [facility] [place]    HYSTERECTOMY (CERVIX STATUS UNKNOWN)      KIDNEY STONE SURGERY Left     LASIK Bilateral     JAXSON-EN-Y GASTRIC BYPASS N/A 2023    GASTRIC BYPASS JAXSON-EN-Y HIATAL HERNIA REPAIR LAPAROSCOPIC/ROBOTIC performed by Anthony Hammer MD at [facility] [place]    THYROID SURGERY      parathyroid    UPPER GASTROINTESTINAL ENDOSCOPY N/A 2023    EGD ESOPHAGOGASTRODUODENOSCOPY WITH BIOPSIES performed by Anthony Hammer MD at [facility]        Family History   Problem Relation Age of Onset    Heart Disease Mother     Heart Surgery Mother         Lizeth valve prolapse    Stroke Mother         Multiple strokes    High Blood Pressure Father     Heart Disease Father     Heart Surgery Father         Pacemaker for Afib    Stroke Maternal Grandmother         Multiple stroked    Colon Cancer Neg Hx     Social History     Tobacco Use    Smoking status: Former     Current packs/day: 0.00     Average packs/day: 0.7 packs/day for 31.7 years

## 2025-01-09 DIAGNOSIS — E78.00 PURE HYPERCHOLESTEROLEMIA: ICD-10-CM

## 2025-01-09 RX ORDER — ROSUVASTATIN CALCIUM 5 MG/1
TABLET, COATED ORAL
Qty: 90 TABLET | Refills: 3 | Status: SHIPPED | OUTPATIENT
Start: 2025-01-09

## 2025-01-09 NOTE — TELEPHONE ENCOUNTER
Comments:     Last Office Visit (last PCP visit):   9/30/2024    Next Visit Date:  Future Appointments   Date Time Provider Department Center   1/21/2025  1:00 PM Arabella Gonzalez MD [facility]   1/24/2025 10:45 AM Arabella Gonzalez MD [facility]   2/17/2025 10:00 AM Jesusita Fragoso RD [facility]   3/14/2025 10:00 AM Milagros Huang PA-C [facility]   5/23/2025  1:00 PM Anthony Hammer MD [facility]   5/23/2025  1:30 PM Jesusita Fragoso RD [facility]       **If hasn't been seen in over a year OR hasn't followed up according to last diabetes/ADHD visit, make appointment for patient before sending refill to provider.    Rx requested:  Requested Prescriptions     Pending Prescriptions Disp Refills    rosuvastatin (CRESTOR) 5 MG tablet [Pharmacy Med Name: ROSUVASTATIN TABS 5MG] 90 tablet 3     Sig: TAKE 1 TABLET DAILY

## 2025-01-21 ENCOUNTER — PROCEDURE VISIT (OUTPATIENT)
Dept: OBGYN CLINIC | Age: 70
End: 2025-01-21
Payer: COMMERCIAL

## 2025-01-21 DIAGNOSIS — N39.41 URGE INCONTINENCE: Primary | ICD-10-CM

## 2025-01-21 PROCEDURE — 64561 IMPLANT NEUROELECTRODES: CPT | Performed by: OBSTETRICS & GYNECOLOGY

## 2025-01-21 NOTE — PROGRESS NOTES
Office procedure note :     Preop DX: refractory urge incontinence     Postop diagnosis : same     Anesthesia : 1% lidocaine with epinephrine     EBL : none     Surgeon : Arabella Gonzalez MD     Percutaneous nerve stimulation procedure     After procedure explained, verbal consent obtained, patient was prepped and draped for procedure. Patient laying flat on stomach. A measuring tape used and 11 cm from coccyx was measured and marked.   2 points were then marked in each side from the midline, 2 cm above and lateral to the 11 cm midline point marked earlier. 5 cc of local anaesthetic was then injected at these points and along a line at a 60 degrees angle directed towards patients legs , along the imaginary line of insertion to the sacral bone.   The spinal needle provided with the interstim kit was then used and introduced at the right marked point at a 60 degree angle and advanced slowly till it hit resistence from sacral bone. More local anaethetic was injected at that point. The needle was withdrawn a little calibrated and reinserted 2 to 3 times until a spongy sensation was felt as the needle was introduced, with no bony resistence, at that point the needle was assumed to have entered the S3 sacral foramen. The interstim electrode was then hooked to needle and the patient did report fluttering sensation in rectal or vaginal areas or both, confirming probable proper placement of needle. Same steps were carried out on the left side. The wires provided were then introduced through the hollow of the spiral needles introduced earlier. At that point procedure was complete. Patient tolerated procedure.    Arabella Gonzalez M.D., FRANJITOAddieG

## 2025-01-24 ENCOUNTER — PROCEDURE VISIT (OUTPATIENT)
Dept: OBGYN CLINIC | Age: 70
End: 2025-01-24
Payer: COMMERCIAL

## 2025-01-24 VITALS
BODY MASS INDEX: 25.69 KG/M2 | SYSTOLIC BLOOD PRESSURE: 108 MMHG | HEART RATE: 68 BPM | WEIGHT: 145 LBS | DIASTOLIC BLOOD PRESSURE: 62 MMHG | HEIGHT: 63 IN

## 2025-01-24 DIAGNOSIS — N39.41 URGE INCONTINENCE: Primary | ICD-10-CM

## 2025-01-24 PROCEDURE — 99214 OFFICE O/P EST MOD 30 MIN: CPT | Performed by: OBSTETRICS & GYNECOLOGY

## 2025-01-24 PROCEDURE — G8417 CALC BMI ABV UP PARAM F/U: HCPCS | Performed by: OBSTETRICS & GYNECOLOGY

## 2025-01-24 PROCEDURE — 1090F PRES/ABSN URINE INCON ASSESS: CPT | Performed by: OBSTETRICS & GYNECOLOGY

## 2025-01-24 PROCEDURE — G8427 DOCREV CUR MEDS BY ELIG CLIN: HCPCS | Performed by: OBSTETRICS & GYNECOLOGY

## 2025-01-24 PROCEDURE — 1036F TOBACCO NON-USER: CPT | Performed by: OBSTETRICS & GYNECOLOGY

## 2025-01-24 PROCEDURE — 3017F COLORECTAL CA SCREEN DOC REV: CPT | Performed by: OBSTETRICS & GYNECOLOGY

## 2025-01-24 PROCEDURE — 0509F URINE INCON PLAN DOCD: CPT | Performed by: OBSTETRICS & GYNECOLOGY

## 2025-01-24 PROCEDURE — G8399 PT W/DXA RESULTS DOCUMENT: HCPCS | Performed by: OBSTETRICS & GYNECOLOGY

## 2025-01-24 PROCEDURE — 1123F ACP DISCUSS/DSCN MKR DOCD: CPT | Performed by: OBSTETRICS & GYNECOLOGY

## 2025-01-24 NOTE — PROGRESS NOTES
tablet TAKE 1 TABLET EVERY MORNING BEFORE BREAKFAST    pramipexole (MIRAPEX) 0.125 mg, Oral, NIGHTLY    rosuvastatin (CRESTOR) 5 MG tablet TAKE 1 TABLET DAILY       Patient's medications, allergies, past medical, surgical, social and family histories were reviewed and updated as appropriate.    Review of Systems  As per chief complaint   All other systems reviewed and are negative.    Physical Exam:  Vitals:  /62 (Site: Left Upper Arm, Position: Sitting, Cuff Size: Medium Adult)   Pulse 68   Ht 1.588 m (5' 2.5\")   Wt 65.8 kg (145 lb)   BMI 26.10 kg/m²   Lungs: CTAB   Heart : Regular S1/S2, no M/R/G  Abdomen: Soft , NT, ND , + BS   Pelvic exam : deferred     Assessment:      Diagnosis Orders   1. Urge incontinence            Plan:     Failed several medical treatments   PNE successful   To be scheduled for stage 1 and stage 2   Sacral neuromodulation and PNE counseling    What I s It?  A Percutaneous Nerve Evaluation (PNE) is a test done in the office to determine if InterStim™ therapy  will improve your urinary symptoms. InterStim™ therapy is an FDA-approved treatment for urinary  urgency, frequency, urge incontinence, and retention. It is a small device that is implanted under the  skin of the upper buttocks. It works by gently stimulating the sacral nerves to help the bladder function  more normally.  Percutaneous Nerve Evaluation:  For the PNE, a temporary wire will be placed along side the third sacral nerve in your lower back. This  nerve controls bladder function. The wire is the size of a thick hair and will carry gentle electrical  pulses to the nerve. The wire will be inserted by myself Dr Gonzalez using a local anesthetic. I  will know that the lead is in the correct position when you feel a tapping, pulsing, vibrating or tingling  sensation in the vaginal or rectal area. Once the wire is in the correct position, it will exit the skin and  be connected to a portable stimulator box that can be

## 2025-01-27 ENCOUNTER — TELEPHONE (OUTPATIENT)
Dept: OBGYN CLINIC | Age: 70
End: 2025-01-27

## 2025-01-27 ENCOUNTER — HOSPITAL ENCOUNTER (OUTPATIENT)
Dept: ULTRASOUND IMAGING | Age: 70
Discharge: HOME | End: 2025-01-29
Attending: OBSTETRICS & GYNECOLOGY
Payer: COMMERCIAL

## 2025-01-27 ENCOUNTER — PREP FOR PROCEDURE (OUTPATIENT)
Dept: OBGYN CLINIC | Age: 70
End: 2025-01-27

## 2025-01-27 DIAGNOSIS — R82.998 CALCIUM OXALATE CRYSTALS IN URINE: ICD-10-CM

## 2025-01-27 DIAGNOSIS — N39.41 URGE INCONTINENCE: ICD-10-CM

## 2025-01-27 PROCEDURE — 76775 US EXAM ABDO BACK WALL LIM: CPT

## 2025-01-27 RX ORDER — SODIUM CHLORIDE 0.9 % (FLUSH) 0.9 %
5-40 SYRINGE (ML) INJECTION EVERY 12 HOURS SCHEDULED
Status: CANCELLED | OUTPATIENT
Start: 2025-01-27

## 2025-01-27 RX ORDER — SODIUM CHLORIDE, SODIUM LACTATE, POTASSIUM CHLORIDE, CALCIUM CHLORIDE 600; 310; 30; 20 MG/100ML; MG/100ML; MG/100ML; MG/100ML
INJECTION, SOLUTION INTRAVENOUS CONTINUOUS
Status: CANCELLED | OUTPATIENT
Start: 2025-01-27

## 2025-01-27 RX ORDER — SODIUM CHLORIDE 0.9 % (FLUSH) 0.9 %
5-40 SYRINGE (ML) INJECTION PRN
Status: CANCELLED | OUTPATIENT
Start: 2025-01-27

## 2025-01-27 RX ORDER — SODIUM CHLORIDE 9 MG/ML
INJECTION, SOLUTION INTRAVENOUS PRN
Status: CANCELLED | OUTPATIENT
Start: 2025-01-27

## 2025-01-27 RX ORDER — ACETAMINOPHEN 325 MG/1
1000 TABLET ORAL ONCE
Status: CANCELLED | OUTPATIENT
Start: 2025-01-27 | End: 2025-01-27

## 2025-01-31 ENCOUNTER — TELEPHONE (OUTPATIENT)
Dept: OBGYN CLINIC | Age: 70
End: 2025-01-31

## 2025-01-31 NOTE — TELEPHONE ENCOUNTER
Received a call from brooklynn grady nurse with [employer] trying to get the pts medtronic interstim approval to go through. Stated needs documentation of the % of improvement of the trial the pt had done. Requesting call back to # documented. Please advise.

## 2025-02-07 ENCOUNTER — HOSPITAL ENCOUNTER (OUTPATIENT)
Dept: PREADMISSION TESTING | Age: 70
Discharge: HOME | End: 2025-02-11

## 2025-02-07 VITALS
OXYGEN SATURATION: 97 % | HEIGHT: 62 IN | BODY MASS INDEX: 26.57 KG/M2 | SYSTOLIC BLOOD PRESSURE: 127 MMHG | HEART RATE: 47 BPM | WEIGHT: 144.4 LBS | TEMPERATURE: 98.1 F | RESPIRATION RATE: 16 BRPM | DIASTOLIC BLOOD PRESSURE: 52 MMHG

## 2025-02-12 ENCOUNTER — HOSPITAL ENCOUNTER (OUTPATIENT)
Age: 70
Setting detail: OUTPATIENT SURGERY
Discharge: HOME | End: 2025-02-12
Attending: OBSTETRICS & GYNECOLOGY | Admitting: OBSTETRICS & GYNECOLOGY
Payer: COMMERCIAL

## 2025-02-12 ENCOUNTER — ANESTHESIA EVENT (OUTPATIENT)
Dept: OPERATING ROOM | Age: 70
End: 2025-02-12
Payer: COMMERCIAL

## 2025-02-12 ENCOUNTER — APPOINTMENT (OUTPATIENT)
Dept: GENERAL RADIOLOGY | Age: 70
End: 2025-02-12
Attending: OBSTETRICS & GYNECOLOGY
Payer: COMMERCIAL

## 2025-02-12 ENCOUNTER — ANESTHESIA (OUTPATIENT)
Dept: OPERATING ROOM | Age: 70
End: 2025-02-12
Payer: COMMERCIAL

## 2025-02-12 VITALS
WEIGHT: 145.8 LBS | BODY MASS INDEX: 26.49 KG/M2 | HEART RATE: 59 BPM | OXYGEN SATURATION: 99 % | DIASTOLIC BLOOD PRESSURE: 57 MMHG | SYSTOLIC BLOOD PRESSURE: 108 MMHG | TEMPERATURE: 97.5 F | RESPIRATION RATE: 14 BRPM

## 2025-02-12 DIAGNOSIS — R52 PAIN: ICD-10-CM

## 2025-02-12 DIAGNOSIS — G89.18 POSTOPERATIVE PAIN: Primary | ICD-10-CM

## 2025-02-12 PROCEDURE — C1778 LEAD, NEUROSTIMULATOR: HCPCS | Performed by: OBSTETRICS & GYNECOLOGY

## 2025-02-12 PROCEDURE — 6370000000 HC RX 637 (ALT 250 FOR IP): Performed by: STUDENT IN AN ORGANIZED HEALTH CARE EDUCATION/TRAINING PROGRAM

## 2025-02-12 PROCEDURE — 7100000011 HC PHASE II RECOVERY - ADDTL 15 MIN: Performed by: OBSTETRICS & GYNECOLOGY

## 2025-02-12 PROCEDURE — 2709999900 HC NON-CHARGEABLE SUPPLY: Performed by: OBSTETRICS & GYNECOLOGY

## 2025-02-12 PROCEDURE — 3700000000 HC ANESTHESIA ATTENDED CARE: Performed by: OBSTETRICS & GYNECOLOGY

## 2025-02-12 PROCEDURE — C1897 LEAD, NEUROSTIM TEST KIT: HCPCS | Performed by: OBSTETRICS & GYNECOLOGY

## 2025-02-12 PROCEDURE — 76000 FLUOROSCOPY <1 HR PHYS/QHP: CPT | Performed by: OBSTETRICS & GYNECOLOGY

## 2025-02-12 PROCEDURE — 6360000002 HC RX W HCPCS: Performed by: NURSE ANESTHETIST, CERTIFIED REGISTERED

## 2025-02-12 PROCEDURE — 3600000004 HC SURGERY LEVEL 4 BASE: Performed by: OBSTETRICS & GYNECOLOGY

## 2025-02-12 PROCEDURE — 7100000000 HC PACU RECOVERY - FIRST 15 MIN: Performed by: OBSTETRICS & GYNECOLOGY

## 2025-02-12 PROCEDURE — 64561 IMPLANT NEUROELECTRODES: CPT | Performed by: OBSTETRICS & GYNECOLOGY

## 2025-02-12 PROCEDURE — C1767 GENERATOR, NEURO NON-RECHARG: HCPCS | Performed by: OBSTETRICS & GYNECOLOGY

## 2025-02-12 PROCEDURE — 2580000003 HC RX 258: Performed by: OBSTETRICS & GYNECOLOGY

## 2025-02-12 PROCEDURE — 2580000003 HC RX 258: Performed by: STUDENT IN AN ORGANIZED HEALTH CARE EDUCATION/TRAINING PROGRAM

## 2025-02-12 PROCEDURE — 2500000003 HC RX 250 WO HCPCS: Performed by: OBSTETRICS & GYNECOLOGY

## 2025-02-12 PROCEDURE — 3700000001 HC ADD 15 MINUTES (ANESTHESIA): Performed by: OBSTETRICS & GYNECOLOGY

## 2025-02-12 PROCEDURE — C1787 PATIENT PROGR, NEUROSTIM: HCPCS | Performed by: OBSTETRICS & GYNECOLOGY

## 2025-02-12 PROCEDURE — 3600000014 HC SURGERY LEVEL 4 ADDTL 15MIN: Performed by: OBSTETRICS & GYNECOLOGY

## 2025-02-12 PROCEDURE — C1889 IMPLANT/INSERT DEVICE, NOC: HCPCS | Performed by: OBSTETRICS & GYNECOLOGY

## 2025-02-12 PROCEDURE — 7100000010 HC PHASE II RECOVERY - FIRST 15 MIN: Performed by: OBSTETRICS & GYNECOLOGY

## 2025-02-12 PROCEDURE — C1883 ADAPT/EXT, PACING/NEURO LEAD: HCPCS | Performed by: OBSTETRICS & GYNECOLOGY

## 2025-02-12 PROCEDURE — 6370000000 HC RX 637 (ALT 250 FOR IP): Performed by: OBSTETRICS & GYNECOLOGY

## 2025-02-12 PROCEDURE — 64590 INS/RPL PRPH SAC/GSTR NPG/R: CPT | Performed by: OBSTETRICS & GYNECOLOGY

## 2025-02-12 PROCEDURE — 7100000001 HC PACU RECOVERY - ADDTL 15 MIN: Performed by: OBSTETRICS & GYNECOLOGY

## 2025-02-12 PROCEDURE — 6360000002 HC RX W HCPCS: Performed by: OBSTETRICS & GYNECOLOGY

## 2025-02-12 PROCEDURE — 95972 ALYS CPLX SP/PN NPGT W/PRGRM: CPT | Performed by: OBSTETRICS & GYNECOLOGY

## 2025-02-12 DEVICE — ENVELOPE PLSE GENRTR M W2.7XL2.5IN NEURO ANTIBACT ABSRB: Type: IMPLANTABLE DEVICE | Site: BUTTOCKS | Status: FUNCTIONAL

## 2025-02-12 DEVICE — NEUROSTIMULATOR INTERSTIM X RECHRGE FREE TORQ WRNCH PROD: Type: IMPLANTABLE DEVICE | Site: BUTTOCKS | Status: FUNCTIONAL

## 2025-02-12 DEVICE — LEAD NERVE STIM 4.32 MM INTERSTIM SURESCAN: Type: IMPLANTABLE DEVICE | Site: SACRUM | Status: FUNCTIONAL

## 2025-02-12 RX ORDER — SODIUM CHLORIDE 0.9 % (FLUSH) 0.9 %
5-40 SYRINGE (ML) INJECTION EVERY 12 HOURS SCHEDULED
Status: DISCONTINUED | OUTPATIENT
Start: 2025-02-12 | End: 2025-02-12 | Stop reason: HOSPADM

## 2025-02-12 RX ORDER — ACETAMINOPHEN 500 MG
1000 TABLET ORAL ONCE
Status: COMPLETED | OUTPATIENT
Start: 2025-02-12 | End: 2025-02-12

## 2025-02-12 RX ORDER — SODIUM CHLORIDE 9 MG/ML
INJECTION, SOLUTION INTRAVENOUS PRN
Status: DISCONTINUED | OUTPATIENT
Start: 2025-02-12 | End: 2025-02-12 | Stop reason: HOSPADM

## 2025-02-12 RX ORDER — SODIUM CHLORIDE 0.9 % (FLUSH) 0.9 %
5-40 SYRINGE (ML) INJECTION PRN
Status: DISCONTINUED | OUTPATIENT
Start: 2025-02-12 | End: 2025-02-12 | Stop reason: HOSPADM

## 2025-02-12 RX ORDER — FAMOTIDINE 20 MG/1
20 TABLET, FILM COATED ORAL 2 TIMES DAILY
Status: CANCELLED | OUTPATIENT
Start: 2025-02-12

## 2025-02-12 RX ORDER — MEPERIDINE HYDROCHLORIDE 25 MG/ML
12.5 INJECTION INTRAMUSCULAR; INTRAVENOUS; SUBCUTANEOUS
Status: DISCONTINUED | OUTPATIENT
Start: 2025-02-12 | End: 2025-02-12 | Stop reason: HOSPADM

## 2025-02-12 RX ORDER — LIDOCAINE HYDROCHLORIDE 10 MG/ML
1 INJECTION, SOLUTION EPIDURAL; INFILTRATION; INTRACAUDAL; PERINEURAL
Status: DISCONTINUED | OUTPATIENT
Start: 2025-02-12 | End: 2025-02-12 | Stop reason: HOSPADM

## 2025-02-12 RX ORDER — ACETAMINOPHEN 500 MG
1000 TABLET ORAL EVERY 6 HOURS PRN
Qty: 60 TABLET | Refills: 0 | Status: SHIPPED | OUTPATIENT
Start: 2025-02-12

## 2025-02-12 RX ORDER — MIDAZOLAM HYDROCHLORIDE 1 MG/ML
INJECTION, SOLUTION INTRAMUSCULAR; INTRAVENOUS
Status: DISCONTINUED | OUTPATIENT
Start: 2025-02-12 | End: 2025-02-12 | Stop reason: SDUPTHER

## 2025-02-12 RX ORDER — KETOROLAC TROMETHAMINE 10 MG/1
10 TABLET, FILM COATED ORAL EVERY 6 HOURS PRN
Qty: 30 TABLET | Refills: 0 | Status: SHIPPED | OUTPATIENT
Start: 2025-02-12

## 2025-02-12 RX ORDER — SODIUM CHLORIDE, SODIUM LACTATE, POTASSIUM CHLORIDE, CALCIUM CHLORIDE 600; 310; 30; 20 MG/100ML; MG/100ML; MG/100ML; MG/100ML
INJECTION, SOLUTION INTRAVENOUS CONTINUOUS
Status: DISCONTINUED | OUTPATIENT
Start: 2025-02-12 | End: 2025-02-12 | Stop reason: HOSPADM

## 2025-02-12 RX ORDER — FENTANYL CITRATE 0.05 MG/ML
50 INJECTION, SOLUTION INTRAMUSCULAR; INTRAVENOUS EVERY 10 MIN PRN
Status: DISCONTINUED | OUTPATIENT
Start: 2025-02-12 | End: 2025-02-12 | Stop reason: HOSPADM

## 2025-02-12 RX ORDER — SODIUM CHLORIDE 9 MG/ML
INJECTION, SOLUTION INTRAVENOUS PRN
Status: CANCELLED | OUTPATIENT
Start: 2025-02-12

## 2025-02-12 RX ORDER — ONDANSETRON 4 MG/1
4 TABLET, ORALLY DISINTEGRATING ORAL EVERY 8 HOURS PRN
Status: CANCELLED | OUTPATIENT
Start: 2025-02-12

## 2025-02-12 RX ORDER — MAGNESIUM HYDROXIDE/ALUMINUM HYDROXICE/SIMETHICONE 120; 1200; 1200 MG/30ML; MG/30ML; MG/30ML
15 SUSPENSION ORAL EVERY 6 HOURS PRN
Status: CANCELLED | OUTPATIENT
Start: 2025-02-12

## 2025-02-12 RX ORDER — LEVOFLOXACIN 500 MG/1
500 TABLET, FILM COATED ORAL DAILY
Qty: 10 TABLET | Refills: 0 | Status: SHIPPED | OUTPATIENT
Start: 2025-02-12 | End: 2025-02-22

## 2025-02-12 RX ORDER — GLYCOPYRROLATE 1 MG/5 ML
SYRINGE (ML) INTRAVENOUS
Status: DISCONTINUED | OUTPATIENT
Start: 2025-02-12 | End: 2025-02-12 | Stop reason: SDUPTHER

## 2025-02-12 RX ORDER — OXYCODONE HYDROCHLORIDE 5 MG/1
10 TABLET ORAL EVERY 4 HOURS PRN
Status: CANCELLED | OUTPATIENT
Start: 2025-02-12

## 2025-02-12 RX ORDER — ONDANSETRON 2 MG/ML
INJECTION INTRAMUSCULAR; INTRAVENOUS
Status: DISCONTINUED | OUTPATIENT
Start: 2025-02-12 | End: 2025-02-12 | Stop reason: SDUPTHER

## 2025-02-12 RX ORDER — DIPHENHYDRAMINE HYDROCHLORIDE 50 MG/ML
12.5 INJECTION INTRAMUSCULAR; INTRAVENOUS
Status: DISCONTINUED | OUTPATIENT
Start: 2025-02-12 | End: 2025-02-12 | Stop reason: HOSPADM

## 2025-02-12 RX ORDER — KETOROLAC TROMETHAMINE 30 MG/ML
30 INJECTION, SOLUTION INTRAMUSCULAR; INTRAVENOUS EVERY 6 HOURS
Status: DISCONTINUED | OUTPATIENT
Start: 2025-02-12 | End: 2025-02-12 | Stop reason: HOSPADM

## 2025-02-12 RX ORDER — ONDANSETRON 2 MG/ML
4 INJECTION INTRAMUSCULAR; INTRAVENOUS EVERY 6 HOURS PRN
Status: CANCELLED | OUTPATIENT
Start: 2025-02-12

## 2025-02-12 RX ORDER — LIDOCAINE HYDROCHLORIDE AND EPINEPHRINE 10; 10 MG/ML; UG/ML
INJECTION, SOLUTION INFILTRATION; PERINEURAL PRN
Status: DISCONTINUED | OUTPATIENT
Start: 2025-02-12 | End: 2025-02-12 | Stop reason: HOSPADM

## 2025-02-12 RX ORDER — SUCCINYLCHOLINE/SOD CL,ISO/PF 100 MG/5ML
SYRINGE (ML) INTRAVENOUS
Status: DISCONTINUED | OUTPATIENT
Start: 2025-02-12 | End: 2025-02-12 | Stop reason: SDUPTHER

## 2025-02-12 RX ORDER — SODIUM CHLORIDE 0.9 % (FLUSH) 0.9 %
5-40 SYRINGE (ML) INJECTION PRN
Status: CANCELLED | OUTPATIENT
Start: 2025-02-12

## 2025-02-12 RX ORDER — CLINDAMYCIN PHOSPHATE 900 MG/50ML
900 INJECTION, SOLUTION INTRAVENOUS
Status: COMPLETED | OUTPATIENT
Start: 2025-02-12 | End: 2025-02-12

## 2025-02-12 RX ORDER — FENTANYL CITRATE 50 UG/ML
INJECTION, SOLUTION INTRAMUSCULAR; INTRAVENOUS
Status: DISCONTINUED | OUTPATIENT
Start: 2025-02-12 | End: 2025-02-12 | Stop reason: SDUPTHER

## 2025-02-12 RX ORDER — METOCLOPRAMIDE HYDROCHLORIDE 5 MG/ML
10 INJECTION INTRAMUSCULAR; INTRAVENOUS
Status: DISCONTINUED | OUTPATIENT
Start: 2025-02-12 | End: 2025-02-12 | Stop reason: HOSPADM

## 2025-02-12 RX ORDER — NALOXONE HYDROCHLORIDE 0.4 MG/ML
INJECTION, SOLUTION INTRAMUSCULAR; INTRAVENOUS; SUBCUTANEOUS PRN
Status: DISCONTINUED | OUTPATIENT
Start: 2025-02-12 | End: 2025-02-12 | Stop reason: HOSPADM

## 2025-02-12 RX ORDER — SODIUM CHLORIDE 0.9 % (FLUSH) 0.9 %
5-40 SYRINGE (ML) INJECTION EVERY 12 HOURS SCHEDULED
Status: CANCELLED | OUTPATIENT
Start: 2025-02-12

## 2025-02-12 RX ORDER — OXYCODONE AND ACETAMINOPHEN 5; 325 MG/1; MG/1
2 TABLET ORAL NIGHTLY PRN
Qty: 10 TABLET | Refills: 0 | Status: SHIPPED | OUTPATIENT
Start: 2025-02-12 | End: 2025-02-17

## 2025-02-12 RX ORDER — ONDANSETRON 2 MG/ML
4 INJECTION INTRAMUSCULAR; INTRAVENOUS
Status: DISCONTINUED | OUTPATIENT
Start: 2025-02-12 | End: 2025-02-12 | Stop reason: HOSPADM

## 2025-02-12 RX ORDER — ACETAMINOPHEN 500 MG
1000 TABLET ORAL EVERY 8 HOURS PRN
Status: CANCELLED | OUTPATIENT
Start: 2025-02-12

## 2025-02-12 RX ORDER — DEXAMETHASONE SODIUM PHOSPHATE 10 MG/ML
INJECTION INTRAMUSCULAR; INTRAVENOUS
Status: DISCONTINUED | OUTPATIENT
Start: 2025-02-12 | End: 2025-02-12 | Stop reason: SDUPTHER

## 2025-02-12 RX ORDER — ACETAMINOPHEN 500 MG
500 TABLET ORAL ONCE
Status: COMPLETED | OUTPATIENT
Start: 2025-02-12 | End: 2025-02-12

## 2025-02-12 RX ORDER — OXYCODONE HYDROCHLORIDE 5 MG/1
5 TABLET ORAL EVERY 4 HOURS PRN
Status: CANCELLED | OUTPATIENT
Start: 2025-02-12

## 2025-02-12 RX ORDER — PROPOFOL 10 MG/ML
INJECTION, EMULSION INTRAVENOUS
Status: DISCONTINUED | OUTPATIENT
Start: 2025-02-12 | End: 2025-02-12 | Stop reason: SDUPTHER

## 2025-02-12 RX ORDER — OXYCODONE HYDROCHLORIDE 5 MG/1
5 TABLET ORAL
Status: COMPLETED | OUTPATIENT
Start: 2025-02-12 | End: 2025-02-12

## 2025-02-12 RX ORDER — HYDROMORPHONE HYDROCHLORIDE 1 MG/ML
1 INJECTION, SOLUTION INTRAMUSCULAR; INTRAVENOUS; SUBCUTANEOUS
Status: CANCELLED | OUTPATIENT
Start: 2025-02-12

## 2025-02-12 RX ADMIN — SODIUM CHLORIDE, POTASSIUM CHLORIDE, SODIUM LACTATE AND CALCIUM CHLORIDE: 600; 310; 30; 20 INJECTION, SOLUTION INTRAVENOUS at 11:11

## 2025-02-12 RX ADMIN — ACETAMINOPHEN 500 MG: 500 TABLET ORAL at 11:20

## 2025-02-12 RX ADMIN — SODIUM CHLORIDE: 9 INJECTION, SOLUTION INTRAVENOUS at 12:43

## 2025-02-12 RX ADMIN — FENTANYL CITRATE 50 MCG: 50 INJECTION, SOLUTION INTRAMUSCULAR; INTRAVENOUS at 12:45

## 2025-02-12 RX ADMIN — DEXAMETHASONE SODIUM PHOSPHATE 10 MG: 10 INJECTION INTRAMUSCULAR; INTRAVENOUS at 12:54

## 2025-02-12 RX ADMIN — Medication 0.2 MG: at 13:33

## 2025-02-12 RX ADMIN — GENTAMICIN SULFATE 98.4 MG: 40 INJECTION, SOLUTION INTRAMUSCULAR; INTRAVENOUS at 12:50

## 2025-02-12 RX ADMIN — ACETAMINOPHEN 1000 MG: 500 TABLET ORAL at 11:13

## 2025-02-12 RX ADMIN — PROPOFOL 200 MG: 10 INJECTION, EMULSION INTRAVENOUS at 12:47

## 2025-02-12 RX ADMIN — OXYCODONE 5 MG: 5 TABLET ORAL at 14:56

## 2025-02-12 RX ADMIN — ONDANSETRON 4 MG: 2 INJECTION, SOLUTION INTRAMUSCULAR; INTRAVENOUS at 12:54

## 2025-02-12 RX ADMIN — Medication 100 MG: at 12:47

## 2025-02-12 RX ADMIN — PHENYLEPHRINE HYDROCHLORIDE 100 MCG: 10 INJECTION INTRAVENOUS at 13:33

## 2025-02-12 RX ADMIN — MIDAZOLAM HYDROCHLORIDE 2 MG: 1 INJECTION, SOLUTION INTRAMUSCULAR; INTRAVENOUS at 12:43

## 2025-02-12 RX ADMIN — FENTANYL CITRATE 50 MCG: 50 INJECTION, SOLUTION INTRAMUSCULAR; INTRAVENOUS at 14:02

## 2025-02-12 RX ADMIN — CLINDAMYCIN IN 5 PERCENT DEXTROSE 900 MG: 18 INJECTION, SOLUTION INTRAVENOUS at 13:05

## 2025-02-12 ASSESSMENT — PAIN DESCRIPTION - LOCATION: LOCATION: BUTTOCKS

## 2025-02-12 ASSESSMENT — PAIN - FUNCTIONAL ASSESSMENT: PAIN_FUNCTIONAL_ASSESSMENT: 0-10

## 2025-02-12 ASSESSMENT — PAIN SCALES - GENERAL: PAINLEVEL_OUTOF10: 4

## 2025-02-12 ASSESSMENT — PAIN DESCRIPTION - ORIENTATION: ORIENTATION: LEFT

## 2025-02-12 NOTE — H&P
Cele Palmer is a 69 y.o. female who presents here today for complaints of Procedure (Lead removal. PNE done 1/21/25.)      Cele Palmer is a 69 y.o. female who presents here today for complaints of Follow-up (Gemtesa.)         History of Present Illness  The patient presents for evaluation of urinary incontinence.     She has been on a prescribed GEMTESA medication regimen for a month but reports that the current medication is not as effective as it was initially. She has previously tried Myrbetriq, Detrol, and Ditropan without success and is now considering a stimulator. She expresses concern about potential nerve damage from the stimulator and its impact on her sciatic nerve. She also questions whether the stimulator will be visible when she wears a bathing suit and if it will affect her ability to perform sit-ups. Additionally, she mentions a persistent odor of urine, even though she does not believe she is experiencing leakage. She is frustrated with her condition and the need for medication and pads. She has been attending the gym for 3 years and is concerned about losing her progress. She is also worried about the stimulator's effect on her ability to perform yoga.     MEDICATIONS  Current: Myrbetriq, Detrol, Ditropan     Had PNE for possibel interstim , here for lead removal , reports > 95 % symptoms improvement for urge incontinence.      Vitals:  /62 (Site: Left Upper Arm, Position: Sitting, Cuff Size: Medium Adult)   Pulse 68   Ht 1.588 m (5' 2.5\")   Wt 65.8 kg (145 lb)   BMI 26.10 kg/m²   Allergies:  Augmentin [amoxicillin-pot clavulanate], Nsaids, Balsam, Cinnamic acide, Linalool, Methylisothiazolinone, and Other  Past Medical History        Past Medical History:   Diagnosis Date    Acne      Asthma      Deep vein thrombosis (DVT) (HCC)       right leg    Depression      Herniated disc       L4-L5    Hip pain, left      Hyperlipidemia      Kidney stones      Overactive bladder       For yrs

## 2025-02-12 NOTE — ANESTHESIA PRE PROCEDURE
Department of Anesthesiology  Preprocedure Note       Name:  Cele Palmer   Age:  69 y.o.  :  1955                                          MRN:  [id number]         Date:  2025      Surgeon: Surgeon(s):  Arabella Gonzalez MD    Procedure: Procedure(s):  MEDTRONIC STAGE 1 AND STAGE 2 VAIBHAV CHERRY IS AWARE.   c-arm    Medications prior to admission:   Prior to Admission medications    Medication Sig Start Date End Date Taking? Authorizing Provider   docusate sodium (COLACE) 100 MG capsule Take 1 capsule by mouth 2 times daily 25 Yes Anthony Hammer MD   docusate sodium (COLACE) 100 MG capsule Take 1 capsule by mouth 2 times daily 25  Yes Milagros Huang PA-C   escitalopram (LEXAPRO) 20 MG tablet Take 1 tablet by mouth daily 24  Yes Milagros Huang PA-C   pramipexole (MIRAPEX) 0.125 MG tablet TAKE 1 TABLET NIGHTLY 24  Yes Milagros Huang PA-C   pantoprazole (PROTONIX) 40 MG tablet TAKE 1 TABLET EVERY MORNING BEFORE BREAKFAST 24  Yes Milagros Huang PA-C   midodrine (PROAMATINE) 2.5 MG tablet Take 1 tablet by mouth daily (with breakfast) 24  Yes Milagros Huang PA-C   D 5000 125 MCG (5000 UT) CAPS capsule  24  Yes Ingrid Sanchez MD   Multiple Vitamins-Minerals (BARIATRIC MULTIVITAMINS/IRON PO) Take by mouth   Yes Ingrid Sanchez MD   CALCIUM PO Take by mouth   Yes Ingrid Sanchez MD   melatonin 3 MG TABS tablet Take 1 tablet by mouth daily   Yes Ingrid Sanchez MD       Current medications:    Current Facility-Administered Medications   Medication Dose Route Frequency Provider Last Rate Last Admin    lactated ringers infusion   IntraVENous Continuous Arabella Gonzalez MD        sodium chloride flush 0.9 % injection 5-40 mL  5-40 mL IntraVENous 2 times per day Arabella Gonzalez MD        sodium chloride flush 0.9 % injection 5-40 mL  5-40 mL IntraVENous PRN Arabella Gonzalez MD        0.9 % sodium chloride infusion   IntraVENous PRN Carlos

## 2025-02-12 NOTE — PROGRESS NOTES
CLINICAL PHARMACY NOTE: MEDS TO BEDS    Total # of Prescriptions Filled: 3   The following medications were delivered to the patient:  Acetaminophen 500mg Tab  Oxycodone/APAP 5-325mg Tab  Levofloxacin 500mg Tab    Additional Documentation: Ketorolac Not given. Pt is allergic to medication

## 2025-02-12 NOTE — DISCHARGE INSTRUCTIONS
applying soap, lathering your hands by rubbing them together with soap for 20 seconds, rinsing them with clean water, and drying them with a clean towel. If soap and water is not available, alcohol-based  may be applied by rubbing the hands together and allowing them to dry for 20 seconds.  Special Instructions: __________________________________________________________________      PAIN:  Pain after surgery is normal and should be expected. When you go home, the anesthesia wears off, and you may experience increased discomfort. Your provider will give you specific instructions on what pain medication to take at home. Listed below is additional information on treating pain after surgery:  Pain medication can give you an upset stomach. Unless your provider has instructed you not to eat or drink, the pain medication should be taken with a small amount of food. Eating will decrease the chance of an upset stomach.  Pain medication can take about 20-30 minutes to start working, so do not wait until the pain worsens before taking a dose. Remember, always take over-the-counter and prescription drugs only as directed by your provider.  Unless otherwise instructed by your provider, applying ice on or around your surgical site can be a great way to help minimize pain and swelling after surgery.  Apply ice to the affected area a minimum of 4 times daily for no longer than 15-20 minutes at a time. It is very important to protect your skin by NOT applying ice or ice pack directly to your skin. Always have a towel or pillow case between your skin and the ice. Frozen vegetable packs like peas or corn make great inexpensive alternatives for use as an icepack.    FOLLOW UP CARE - Call your Physician if any of the following occur:  Increased swelling, redness, warmth, hardness around operative area,  Blood soaked dressings (small amounts of oozing may be normal),  Numb, tingling, or cold fingers or toes (for surgeries on

## 2025-02-12 NOTE — OP NOTE
Interstim 1 and 2      Description: Stage I and II neuromodulator.     PREOPERATIVE DIAGNOSIS: Refractory urgency and frequency.     POSTOPERATIVE DIAGNOSIS: Refractory urgency and frequency.     OPERATION: Stage I and II neuromodulator.     ANESTHESIA: General anaesthesia.     ESTIMATED BLOOD LOSS: Minimal.     FLUIDS: Crystalloid. The patient was given Ancef preop antibiotic. Gentamicin irrigation was used throughout the procedure.        DETAILS OF THE OPERATION: The patient was brought to the OR. The patient was placed in prone position. A pillow was placed underneath her pelvis area to slightly lift the pelvis up. The patient under general anesthesia through the IV . The patient's back was prepped and draped in the usual sterile fashion. Under fluoroscopy, the needle placement was confirmed in left S3 foramen. Also by the toe movement and arik ( anal sphincter contractions), indicating the proper positioning of the needle. A wire was placed. The tract was dilated and lead was placed.  Most of the leads had very low amplitude and stimulation with great responses as mentioned above . Lead was tunneled under the skin and was brought out through an incision on the left  upper buttocks. Please note that the lidocaine was injected prior to the tunneling. A pouch was created about 1 cm beneath the subcutaneous tissue over the muscle where the actual unit was connected to the lead on the left upper buttock . Screws were turned and they were dropped. Attention was made to ensure that the lead was all the way in into the InterStim. Irrigation was performed using gentamicin solution after placing the main unit in the pouch. Impedance was checked. Irrigation was again performed with antibiotic irrigation solution. The needle site was closed using 4-0 Monocryl. The pouch was closed using subcutaneous tissue with 2-0 Vicryl and  4-0 Vicryl for subcuticular and dermabond .  Incision covered.     The patient was transferred

## 2025-02-12 NOTE — ANESTHESIA POSTPROCEDURE EVALUATION
Department of Anesthesiology  Postprocedure Note    Patient: Cele Palmer  MRN: [id number]  YOB: 1955  Date of evaluation: 2/12/2025    Procedure Summary       Date: 02/12/25 Room / Location: [address]    Anesthesia Start: 1245 Anesthesia Stop: 1408    Procedure: MEDTRONIC STAGE 1 AND STAGE 2 INTERSTIM Diagnosis:       Urge incontinence      (Urge incontinence [N39.41])    Surgeons: Arabella Gonzalez MD Responsible Provider: Brenton Marks DO    Anesthesia Type: general ASA Status: 2            Anesthesia Type: No value filed.    Yesi Phase I: Yesi Score: 8    Yesi Phase II:      Anesthesia Post Evaluation    Patient location during evaluation: bedside  Patient participation: complete - patient participated  Level of consciousness: awake and awake and alert  Pain score: 0  Airway patency: patent  Nausea & Vomiting: no nausea and no vomiting  Cardiovascular status: blood pressure returned to baseline and hemodynamically stable  Respiratory status: acceptable  Hydration status: euvolemic  Pain management: adequate        No notable events documented.

## 2025-02-17 ENCOUNTER — OFFICE VISIT (OUTPATIENT)
Dept: BARIATRICS/WEIGHT MGMT | Age: 70
End: 2025-02-17
Payer: COMMERCIAL

## 2025-02-17 VITALS — BODY MASS INDEX: 26.24 KG/M2 | HEIGHT: 63 IN

## 2025-02-17 DIAGNOSIS — E66.3 OVERWEIGHT (BMI 25.0-29.9): Primary | ICD-10-CM

## 2025-02-17 DIAGNOSIS — Z71.3 DIETARY COUNSELING AND SURVEILLANCE: ICD-10-CM

## 2025-02-17 DIAGNOSIS — Z98.84 S/P GASTRIC BYPASS: ICD-10-CM

## 2025-02-17 PROCEDURE — G8417 CALC BMI ABV UP PARAM F/U: HCPCS | Performed by: DIETITIAN, REGISTERED

## 2025-02-17 PROCEDURE — 97803 MED NUTRITION INDIV SUBSEQ: CPT | Performed by: DIETITIAN, REGISTERED

## 2025-02-17 PROCEDURE — G8428 CUR MEDS NOT DOCUMENT: HCPCS | Performed by: DIETITIAN, REGISTERED

## 2025-02-17 NOTE — PROGRESS NOTES
Medical Nutrition Therapy  [facility]    Patient here for her 15 month follow up post op Mami en Y gastric bypass.         2/17/2025  Height:   Ht Readings from Last 1 Encounters:   02/07/25 1.58 m (5' 2.21\")     Weight:   Wt Readings from Last 1 Encounters:   02/12/25 66.1 kg (145 lb 12.8 oz)     BMI:   BMI Readings from Last 1 Encounters:   02/12/25 26.49 kg/m²       Pre-Surgery Weight: 201 lbs  TBW lost: 57 lbs  % EBW lost: 92 %    Patient has lost a total of 92 lbs from start of program. Patient has maintained weight since 11/2024 visit (1 year follow up)    Labs reviewed: N/A    Intolerances/Difficulties: none    Vitamins:  Bariatric MVI with iron  and Calcium Citrate 1000-1500mg/day    Protein goal: met, 70-80 grams/day    Fluid goal: met, 64 oz/day     Exercise: cardio/strength training 5 days/week    Nutrition Diagnosis:  No nutrition diagnosis at this time.    Nutrition Intervention:  Education provided to include review of post surgical diet progression, meal timing, protein and fluid intake, vitamins, and exercise.     Nutrition Monitoring and evaluation:  Protein intake: 75-90 grams/day 1.5-1.8gr/kg IBW)  Fluid intake: 64-90oz sugar free, calorie free, caffeine free, non carbonated beverages  Vitamins: Bariatric MVI with iron,  Calcium citrate 1000-1500mg/day in divided doses  Exercise: 45-60 minutes of exercise 5 days/week, including 3 days of strength training    Plan/Recommendation:   Continue current intake and exercise    Time spent with patient 30 minutes    Follow up per program protocol    Jesusita Fragoso RD

## 2025-02-26 ENCOUNTER — OFFICE VISIT (OUTPATIENT)
Dept: OBGYN CLINIC | Age: 70
End: 2025-02-26

## 2025-02-26 VITALS
BODY MASS INDEX: 25.34 KG/M2 | HEART RATE: 68 BPM | WEIGHT: 143 LBS | DIASTOLIC BLOOD PRESSURE: 60 MMHG | HEIGHT: 63 IN | SYSTOLIC BLOOD PRESSURE: 104 MMHG

## 2025-02-26 DIAGNOSIS — N39.41 URGE INCONTINENCE: ICD-10-CM

## 2025-02-26 DIAGNOSIS — Z09 POSTOP CHECK: Primary | ICD-10-CM

## 2025-02-26 LAB
BILIRUB UR QL STRIP: NEGATIVE
CLARITY UR: CLEAR
COLOR UR: ABNORMAL
GLUCOSE UR STRIP-MCNC: NEGATIVE MG/DL
HGB UR QL STRIP: NEGATIVE
KETONES UR STRIP-MCNC: NEGATIVE MG/DL
LEUKOCYTE ESTERASE UR QL STRIP: NEGATIVE
NITRITE UR QL STRIP: NEGATIVE
PH UR STRIP: 5 [PH] (ref 5–9)
PROT UR STRIP-MCNC: NEGATIVE MG/DL
SP GR UR STRIP: 1.03 (ref 1–1.03)
UROBILINOGEN UR STRIP-ACNC: 0.2 E.U./DL

## 2025-02-26 RX ORDER — NITROFURANTOIN 25; 75 MG/1; MG/1
100 CAPSULE ORAL 2 TIMES DAILY
Qty: 10 CAPSULE | Refills: 0 | Status: SHIPPED | OUTPATIENT
Start: 2025-02-26 | End: 2025-02-26

## 2025-02-26 SDOH — ECONOMIC STABILITY: FOOD INSECURITY: WITHIN THE PAST 12 MONTHS, YOU WORRIED THAT YOUR FOOD WOULD RUN OUT BEFORE YOU GOT MONEY TO BUY MORE.: NEVER TRUE

## 2025-02-26 SDOH — ECONOMIC STABILITY: FOOD INSECURITY: WITHIN THE PAST 12 MONTHS, THE FOOD YOU BOUGHT JUST DIDN'T LAST AND YOU DIDN'T HAVE MONEY TO GET MORE.: NEVER TRUE

## 2025-02-26 NOTE — PROGRESS NOTES
Postop Progress Note    Subjective    presents to the office for postop follow up. 2 wks post stage 1 and stage 2 intersim. Patient complaining about suboptimal symptom conrol, and mentions that when she increases the intensity of the device to achieve a better effect , she gets sharp pain at her upper left buttock , so she stopped increasing it .     Objective    Vitals:    02/26/25 1242   BP: 104/60   Pulse: 68         General: alert, cooperative and no distress  Buttock Incision: healing well  Vag exam: deferred     Assessment  Doing well postoperatively.    Plan  Interstim checked and re-programmed . Changed program from # 2 at 2.4 intensity , to program # 3 at 2.7 , patient denies any sensations as described above after switching the program . I did help the patient understand why program was changed and how to change the program and when to change as needed.     Urine sent for analysis and culture      Aarbella Gonzalez M.D., F.A.C.O.G

## 2025-02-28 LAB — BACTERIA UR CULT: NORMAL

## 2025-03-23 ENCOUNTER — HOSPITAL ENCOUNTER (INPATIENT)
Age: 70
LOS: 2 days | Discharge: HOME | DRG: 392 | End: 2025-03-25
Attending: INTERNAL MEDICINE | Admitting: INTERNAL MEDICINE
Payer: COMMERCIAL

## 2025-03-23 ENCOUNTER — APPOINTMENT (OUTPATIENT)
Dept: CT IMAGING | Age: 70
DRG: 392 | End: 2025-03-23
Payer: COMMERCIAL

## 2025-03-23 DIAGNOSIS — R10.10 PAIN OF UPPER ABDOMEN: ICD-10-CM

## 2025-03-23 DIAGNOSIS — K56.600 PARTIAL SMALL BOWEL OBSTRUCTION (HCC): Primary | ICD-10-CM

## 2025-03-23 PROBLEM — K56.609 SBO (SMALL BOWEL OBSTRUCTION) (HCC): Status: ACTIVE | Noted: 2025-03-23

## 2025-03-23 LAB
ALBUMIN SERPL-MCNC: 4.3 G/DL (ref 3.5–4.6)
ALP SERPL-CCNC: 121 U/L (ref 40–130)
ALT SERPL-CCNC: 41 U/L (ref 0–33)
AMYLASE SERPL-CCNC: 102 U/L (ref 22–93)
ANION GAP SERPL CALCULATED.3IONS-SCNC: 7 MEQ/L (ref 9–15)
AST SERPL-CCNC: 32 U/L (ref 0–35)
BASOPHILS # BLD: 0 K/UL (ref 0–0.1)
BASOPHILS NFR BLD: 0.3 % (ref 0.1–1.2)
BILIRUB SERPL-MCNC: 0.8 MG/DL (ref 0.2–0.7)
BILIRUB UR QL STRIP: NEGATIVE
BUN SERPL-MCNC: 11 MG/DL (ref 8–23)
CALCIUM SERPL-MCNC: 10 MG/DL (ref 8.5–9.9)
CHLORIDE SERPL-SCNC: 104 MEQ/L (ref 95–107)
CLARITY UR: CLEAR
CO2 SERPL-SCNC: 30 MEQ/L (ref 20–31)
COLOR UR: YELLOW
CREAT SERPL-MCNC: 0.8 MG/DL (ref 0.5–0.9)
EOSINOPHIL # BLD: 0.1 K/UL (ref 0–0.4)
EOSINOPHIL NFR BLD: 1.3 % (ref 0.7–5.8)
ERYTHROCYTE [DISTWIDTH] IN BLOOD BY AUTOMATED COUNT: 13.4 % (ref 11.7–14.4)
GLOBULIN SER CALC-MCNC: 2.8 G/DL (ref 2.3–3.5)
GLUCOSE SERPL-MCNC: 107 MG/DL (ref 70–99)
GLUCOSE UR STRIP-MCNC: NEGATIVE MG/DL
HCT VFR BLD AUTO: 40.6 % (ref 37–47)
HGB BLD-MCNC: 13.3 G/DL (ref 11.2–15.7)
HGB UR QL STRIP: NEGATIVE
IMM GRANULOCYTES # BLD: 0 K/UL
IMM GRANULOCYTES NFR BLD: 0.3 %
KETONES UR STRIP-MCNC: NEGATIVE MG/DL
LEUKOCYTE ESTERASE UR QL STRIP: NEGATIVE
LIPASE SERPL-CCNC: 34 U/L (ref 12–95)
LYMPHOCYTES # BLD: 2.1 K/UL (ref 1.2–3.7)
LYMPHOCYTES NFR BLD: 27.8 %
MCH RBC QN AUTO: 30.1 PG (ref 25.6–32.2)
MCHC RBC AUTO-ENTMCNC: 32.8 % (ref 32.2–35.5)
MCV RBC AUTO: 91.9 FL (ref 79.4–94.8)
MONOCYTES # BLD: 0.7 K/UL (ref 0.2–0.9)
MONOCYTES NFR BLD: 9.1 % (ref 4.7–12.5)
NEUTROPHILS # BLD: 4.6 K/UL (ref 1.6–6.1)
NEUTS SEG NFR BLD: 61.2 % (ref 34–71.1)
NITRITE UR QL STRIP: NEGATIVE
PH UR STRIP: 5 [PH] (ref 5–9)
PLATELET # BLD AUTO: 177 K/UL (ref 182–369)
POTASSIUM SERPL-SCNC: 3.8 MEQ/L (ref 3.4–4.9)
PROT SERPL-MCNC: 7.1 G/DL (ref 6.3–8)
PROT UR STRIP-MCNC: NEGATIVE MG/DL
RBC # BLD AUTO: 4.42 M/UL (ref 3.93–5.22)
SODIUM SERPL-SCNC: 141 MEQ/L (ref 135–144)
SP GR UR STRIP: 1.01 (ref 1–1.03)
URINE REFLEX TO CULTURE: NORMAL
UROBILINOGEN UR STRIP-ACNC: 0.2 E.U./DL
WBC # BLD AUTO: 7.5 K/UL (ref 4–10)

## 2025-03-23 PROCEDURE — 99285 EMERGENCY DEPT VISIT HI MDM: CPT

## 2025-03-23 PROCEDURE — 6360000002 HC RX W HCPCS

## 2025-03-23 PROCEDURE — 2500000003 HC RX 250 WO HCPCS: Performed by: INTERNAL MEDICINE

## 2025-03-23 PROCEDURE — 96361 HYDRATE IV INFUSION ADD-ON: CPT

## 2025-03-23 PROCEDURE — 36415 COLL VENOUS BLD VENIPUNCTURE: CPT

## 2025-03-23 PROCEDURE — 96375 TX/PRO/DX INJ NEW DRUG ADDON: CPT

## 2025-03-23 PROCEDURE — 80053 COMPREHEN METABOLIC PANEL: CPT

## 2025-03-23 PROCEDURE — 6360000004 HC RX CONTRAST MEDICATION

## 2025-03-23 PROCEDURE — 83690 ASSAY OF LIPASE: CPT

## 2025-03-23 PROCEDURE — 1210000000 HC MED SURG R&B

## 2025-03-23 PROCEDURE — 2580000003 HC RX 258: Performed by: INTERNAL MEDICINE

## 2025-03-23 PROCEDURE — 85025 COMPLETE CBC W/AUTO DIFF WBC: CPT

## 2025-03-23 PROCEDURE — G0378 HOSPITAL OBSERVATION PER HR: HCPCS

## 2025-03-23 PROCEDURE — 96374 THER/PROPH/DIAG INJ IV PUSH: CPT

## 2025-03-23 PROCEDURE — 74177 CT ABD & PELVIS W/CONTRAST: CPT

## 2025-03-23 PROCEDURE — 81003 URINALYSIS AUTO W/O SCOPE: CPT

## 2025-03-23 PROCEDURE — 82150 ASSAY OF AMYLASE: CPT

## 2025-03-23 PROCEDURE — 2580000003 HC RX 258

## 2025-03-23 RX ORDER — ACETAMINOPHEN 325 MG/1
650 TABLET ORAL EVERY 6 HOURS PRN
Status: DISCONTINUED | OUTPATIENT
Start: 2025-03-23 | End: 2025-03-24

## 2025-03-23 RX ORDER — ONDANSETRON 4 MG/1
4 TABLET, ORALLY DISINTEGRATING ORAL EVERY 8 HOURS PRN
Status: DISCONTINUED | OUTPATIENT
Start: 2025-03-23 | End: 2025-03-25 | Stop reason: HOSPADM

## 2025-03-23 RX ORDER — MORPHINE SULFATE 4 MG/ML
4 INJECTION INTRAVENOUS ONCE
Status: COMPLETED | OUTPATIENT
Start: 2025-03-23 | End: 2025-03-23

## 2025-03-23 RX ORDER — SODIUM CHLORIDE 0.9 % (FLUSH) 0.9 %
5-40 SYRINGE (ML) INJECTION PRN
Status: DISCONTINUED | OUTPATIENT
Start: 2025-03-23 | End: 2025-03-25 | Stop reason: HOSPADM

## 2025-03-23 RX ORDER — POTASSIUM CHLORIDE 1500 MG/1
40 TABLET, EXTENDED RELEASE ORAL PRN
Status: DISCONTINUED | OUTPATIENT
Start: 2025-03-23 | End: 2025-03-24

## 2025-03-23 RX ORDER — SODIUM CHLORIDE 9 MG/ML
INJECTION, SOLUTION INTRAVENOUS PRN
Status: DISCONTINUED | OUTPATIENT
Start: 2025-03-23 | End: 2025-03-25 | Stop reason: HOSPADM

## 2025-03-23 RX ORDER — ONDANSETRON 2 MG/ML
4 INJECTION INTRAMUSCULAR; INTRAVENOUS EVERY 6 HOURS PRN
Status: DISCONTINUED | OUTPATIENT
Start: 2025-03-23 | End: 2025-03-25 | Stop reason: HOSPADM

## 2025-03-23 RX ORDER — POTASSIUM CHLORIDE 7.45 MG/ML
10 INJECTION INTRAVENOUS PRN
Status: DISCONTINUED | OUTPATIENT
Start: 2025-03-23 | End: 2025-03-24

## 2025-03-23 RX ORDER — ACETAMINOPHEN 650 MG/1
650 SUPPOSITORY RECTAL EVERY 6 HOURS PRN
Status: DISCONTINUED | OUTPATIENT
Start: 2025-03-23 | End: 2025-03-24

## 2025-03-23 RX ORDER — IOPAMIDOL 755 MG/ML
75 INJECTION, SOLUTION INTRAVASCULAR
Status: COMPLETED | OUTPATIENT
Start: 2025-03-23 | End: 2025-03-23

## 2025-03-23 RX ORDER — HYDROMORPHONE HYDROCHLORIDE 1 MG/ML
0.5 INJECTION, SOLUTION INTRAMUSCULAR; INTRAVENOUS; SUBCUTANEOUS EVERY 4 HOURS PRN
Status: DISCONTINUED | OUTPATIENT
Start: 2025-03-23 | End: 2025-03-25 | Stop reason: HOSPADM

## 2025-03-23 RX ORDER — PROCHLORPERAZINE EDISYLATE 5 MG/ML
10 INJECTION INTRAMUSCULAR; INTRAVENOUS EVERY 6 HOURS PRN
Status: DISCONTINUED | OUTPATIENT
Start: 2025-03-23 | End: 2025-03-25 | Stop reason: HOSPADM

## 2025-03-23 RX ORDER — SODIUM CHLORIDE 9 MG/ML
INJECTION, SOLUTION INTRAVENOUS CONTINUOUS
Status: DISCONTINUED | OUTPATIENT
Start: 2025-03-23 | End: 2025-03-25

## 2025-03-23 RX ORDER — TRAMADOL HYDROCHLORIDE 50 MG/1
100 TABLET ORAL EVERY 6 HOURS PRN
COMMUNITY
End: 2025-03-27

## 2025-03-23 RX ORDER — 0.9 % SODIUM CHLORIDE 0.9 %
500 INTRAVENOUS SOLUTION INTRAVENOUS ONCE
Status: COMPLETED | OUTPATIENT
Start: 2025-03-23 | End: 2025-03-23

## 2025-03-23 RX ORDER — MAGNESIUM SULFATE IN WATER 40 MG/ML
2000 INJECTION, SOLUTION INTRAVENOUS PRN
Status: DISCONTINUED | OUTPATIENT
Start: 2025-03-23 | End: 2025-03-24

## 2025-03-23 RX ORDER — ENOXAPARIN SODIUM 100 MG/ML
40 INJECTION SUBCUTANEOUS DAILY
Status: DISCONTINUED | OUTPATIENT
Start: 2025-03-24 | End: 2025-03-25 | Stop reason: HOSPADM

## 2025-03-23 RX ORDER — POLYETHYLENE GLYCOL 3350 17 G/17G
17 POWDER, FOR SOLUTION ORAL DAILY PRN
Status: DISCONTINUED | OUTPATIENT
Start: 2025-03-23 | End: 2025-03-25 | Stop reason: HOSPADM

## 2025-03-23 RX ORDER — SODIUM CHLORIDE 0.9 % (FLUSH) 0.9 %
5-40 SYRINGE (ML) INJECTION EVERY 12 HOURS SCHEDULED
Status: DISCONTINUED | OUTPATIENT
Start: 2025-03-23 | End: 2025-03-24

## 2025-03-23 RX ORDER — ONDANSETRON 2 MG/ML
4 INJECTION INTRAMUSCULAR; INTRAVENOUS ONCE
Status: COMPLETED | OUTPATIENT
Start: 2025-03-23 | End: 2025-03-23

## 2025-03-23 RX ADMIN — IOPAMIDOL 75 ML: 755 INJECTION, SOLUTION INTRAVENOUS at 19:54

## 2025-03-23 RX ADMIN — MORPHINE SULFATE 4 MG: 4 INJECTION INTRAVENOUS at 19:13

## 2025-03-23 RX ADMIN — ONDANSETRON 4 MG: 2 INJECTION, SOLUTION INTRAMUSCULAR; INTRAVENOUS at 19:13

## 2025-03-23 RX ADMIN — SODIUM CHLORIDE: 0.9 INJECTION, SOLUTION INTRAVENOUS at 23:21

## 2025-03-23 RX ADMIN — SODIUM CHLORIDE 500 ML: 0.9 INJECTION, SOLUTION INTRAVENOUS at 19:12

## 2025-03-23 RX ADMIN — SODIUM CHLORIDE, PRESERVATIVE FREE 10 ML: 5 INJECTION INTRAVENOUS at 23:12

## 2025-03-23 ASSESSMENT — PAIN SCALES - GENERAL: PAINLEVEL_OUTOF10: 10

## 2025-03-23 ASSESSMENT — PAIN - FUNCTIONAL ASSESSMENT: PAIN_FUNCTIONAL_ASSESSMENT: 0-10

## 2025-03-23 ASSESSMENT — PAIN DESCRIPTION - LOCATION: LOCATION: ABDOMEN

## 2025-03-23 NOTE — ED PROVIDER NOTES
[facility] EMERGENCY DEPARTMENT  eMERGENCYdEPARTMENT eNCOUnter      Pt Name: Cele Palmer  MRN: [id number]  Birthdate 1955of evaluation: 3/23/2025  Provider:VANESA Sanchez CNP    CHIEF COMPLAINT       Chief Complaint   Patient presents with    Abdominal Pain     X1 day          HISTORY OF PRESENT ILLNESS  (Location/Symptom, Timing/Onset, Context/Setting, Quality, Duration, Modifying Factors, Severity.)   Cele Palmer is a 69 y.o. female history of kidney stones, asthma, asthma, anxiety, depression, surgical history of hysterectomy, cholecystectomy, gastric bypass, who presents to the emergency department abdominal pain.  Patient presents to emergency department with abdominal pain that started at 10 PM last night.  Patient rates her pain a 10 out of 10 stabbing in the upper abdominal area.  She had 1 episode of emesis last night.  Denies any sick contacts she tried taking her tramadol with no relief of the pain.  She did move her bowels this morning they were normal in nature no dark, tarry, or bloody stool.  She denies any injury or trauma.  Denies any flank pain or dysuria.   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  Denies any chest pain, shortness of breath, diarrhea, fever, chills, headache or recent illness.    HPI    Nursing Notes were reviewed and I agree.    REVIEW OF SYSTEMS    (2-9 systems for level 4, 10 or more for level 5)     Review of Systems   Constitutional:  Negative for activity change, chills and fever.   HENT:  Negative for ear pain and sore throat.    Eyes:  Negative for visual disturbance.   Respiratory:  Negative for cough and shortness of breath.    Cardiovascular:  Negative for chest pain, palpitations and leg swelling.   Gastrointestinal:  Positive for abdominal pain, nausea and vomiting. Negative for diarrhea.   Genitourinary:  Negative for dysuria.   Musculoskeletal:  Negative for back pain.   Skin:  Negative for rash.   Neurological:  Negative for

## 2025-03-24 ENCOUNTER — APPOINTMENT (OUTPATIENT)
Dept: GENERAL RADIOLOGY | Age: 70
DRG: 392 | End: 2025-03-24
Payer: COMMERCIAL

## 2025-03-24 LAB
ALBUMIN SERPL-MCNC: 3.4 G/DL (ref 3.5–4.6)
ALP SERPL-CCNC: 106 U/L (ref 40–130)
ALT SERPL-CCNC: 86 U/L (ref 0–33)
ANION GAP SERPL CALCULATED.3IONS-SCNC: 7 MEQ/L (ref 9–15)
AST SERPL-CCNC: 152 U/L (ref 0–35)
BASOPHILS # BLD: 0 K/UL (ref 0–0.1)
BASOPHILS NFR BLD: 0.3 % (ref 0.1–1.2)
BILIRUB SERPL-MCNC: 0.8 MG/DL (ref 0.2–0.7)
BUN SERPL-MCNC: 11 MG/DL (ref 8–23)
CALCIUM SERPL-MCNC: 8.7 MG/DL (ref 8.5–9.9)
CHLORIDE SERPL-SCNC: 107 MEQ/L (ref 95–107)
CO2 SERPL-SCNC: 28 MEQ/L (ref 20–31)
CREAT SERPL-MCNC: 0.7 MG/DL (ref 0.5–0.9)
EOSINOPHIL # BLD: 0.2 K/UL (ref 0–0.4)
EOSINOPHIL NFR BLD: 3.3 % (ref 0.7–5.8)
ERYTHROCYTE [DISTWIDTH] IN BLOOD BY AUTOMATED COUNT: 13.7 % (ref 11.7–14.4)
GLOBULIN SER CALC-MCNC: 2 G/DL (ref 2.3–3.5)
GLUCOSE SERPL-MCNC: 90 MG/DL (ref 70–99)
HCT VFR BLD AUTO: 33.4 % (ref 37–47)
HGB BLD-MCNC: 11 G/DL (ref 11.2–15.7)
IMM GRANULOCYTES # BLD: 0 K/UL
IMM GRANULOCYTES NFR BLD: 0.2 %
LYMPHOCYTES # BLD: 2.1 K/UL (ref 1.2–3.7)
LYMPHOCYTES NFR BLD: 36 %
MCH RBC QN AUTO: 30.6 PG (ref 25.6–32.2)
MCHC RBC AUTO-ENTMCNC: 32.9 % (ref 32.2–35.5)
MCV RBC AUTO: 92.8 FL (ref 79.4–94.8)
MONOCYTES # BLD: 0.6 K/UL (ref 0.2–0.9)
MONOCYTES NFR BLD: 10.7 % (ref 4.7–12.5)
NEUTROPHILS # BLD: 2.9 K/UL (ref 1.6–6.1)
NEUTS SEG NFR BLD: 49.5 % (ref 34–71.1)
PLATELET # BLD AUTO: 131 K/UL (ref 182–369)
POTASSIUM SERPL-SCNC: 4 MEQ/L (ref 3.4–4.9)
PROT SERPL-MCNC: 5.4 G/DL (ref 6.3–8)
RBC # BLD AUTO: 3.6 M/UL (ref 3.93–5.22)
SODIUM SERPL-SCNC: 142 MEQ/L (ref 135–144)
WBC # BLD AUTO: 5.8 K/UL (ref 4–10)

## 2025-03-24 PROCEDURE — 1210000000 HC MED SURG R&B

## 2025-03-24 PROCEDURE — 74250 X-RAY XM SM INT 1CNTRST STD: CPT

## 2025-03-24 PROCEDURE — 6370000000 HC RX 637 (ALT 250 FOR IP): Performed by: INTERNAL MEDICINE

## 2025-03-24 PROCEDURE — 96372 THER/PROPH/DIAG INJ SC/IM: CPT

## 2025-03-24 PROCEDURE — 6360000002 HC RX W HCPCS: Performed by: INTERNAL MEDICINE

## 2025-03-24 PROCEDURE — 6360000004 HC RX CONTRAST MEDICATION: Performed by: INTERNAL MEDICINE

## 2025-03-24 PROCEDURE — 2580000003 HC RX 258: Performed by: INTERNAL MEDICINE

## 2025-03-24 PROCEDURE — 96376 TX/PRO/DX INJ SAME DRUG ADON: CPT

## 2025-03-24 PROCEDURE — G0378 HOSPITAL OBSERVATION PER HR: HCPCS

## 2025-03-24 PROCEDURE — 85025 COMPLETE CBC W/AUTO DIFF WBC: CPT

## 2025-03-24 PROCEDURE — 80053 COMPREHEN METABOLIC PANEL: CPT

## 2025-03-24 PROCEDURE — 96375 TX/PRO/DX INJ NEW DRUG ADDON: CPT

## 2025-03-24 PROCEDURE — 36415 COLL VENOUS BLD VENIPUNCTURE: CPT

## 2025-03-24 RX ORDER — ACETAMINOPHEN 325 MG/1
650 TABLET ORAL EVERY 6 HOURS PRN
Status: DISCONTINUED | OUTPATIENT
Start: 2025-03-24 | End: 2025-03-25 | Stop reason: HOSPADM

## 2025-03-24 RX ORDER — DIATRIZOATE MEGLUMINE AND DIATRIZOATE SODIUM 660; 100 MG/ML; MG/ML
30 SOLUTION ORAL; RECTAL
Status: DISCONTINUED | OUTPATIENT
Start: 2025-03-24 | End: 2025-03-25 | Stop reason: HOSPADM

## 2025-03-24 RX ORDER — METOCLOPRAMIDE HYDROCHLORIDE 5 MG/ML
10 INJECTION INTRAMUSCULAR; INTRAVENOUS EVERY 6 HOURS
Status: DISCONTINUED | OUTPATIENT
Start: 2025-03-24 | End: 2025-03-25 | Stop reason: HOSPADM

## 2025-03-24 RX ORDER — ACETAMINOPHEN 650 MG/1
650 SUPPOSITORY RECTAL EVERY 6 HOURS PRN
Status: DISCONTINUED | OUTPATIENT
Start: 2025-03-24 | End: 2025-03-25 | Stop reason: HOSPADM

## 2025-03-24 RX ORDER — KETOROLAC TROMETHAMINE 15 MG/ML
15 INJECTION, SOLUTION INTRAMUSCULAR; INTRAVENOUS EVERY 6 HOURS PRN
Status: DISCONTINUED | OUTPATIENT
Start: 2025-03-24 | End: 2025-03-25 | Stop reason: HOSPADM

## 2025-03-24 RX ORDER — SODIUM PHOSPHATE, DIBASIC AND SODIUM PHOSPHATE, MONOBASIC 7; 19 G/230ML; G/230ML
1 ENEMA RECTAL ONCE
Status: COMPLETED | OUTPATIENT
Start: 2025-03-24 | End: 2025-03-24

## 2025-03-24 RX ADMIN — ENOXAPARIN SODIUM 40 MG: 100 INJECTION SUBCUTANEOUS at 09:26

## 2025-03-24 RX ADMIN — PANTOPRAZOLE SODIUM 40 MG: 40 INJECTION, POWDER, LYOPHILIZED, FOR SOLUTION INTRAVENOUS at 10:30

## 2025-03-24 RX ADMIN — METOCLOPRAMIDE 10 MG: 5 INJECTION, SOLUTION INTRAMUSCULAR; INTRAVENOUS at 22:45

## 2025-03-24 RX ADMIN — SODIUM CHLORIDE: 0.9 INJECTION, SOLUTION INTRAVENOUS at 18:27

## 2025-03-24 RX ADMIN — KETOROLAC TROMETHAMINE 15 MG: 15 INJECTION, SOLUTION INTRAMUSCULAR; INTRAVENOUS at 11:58

## 2025-03-24 RX ADMIN — DIATRIZOATE MEGLUMINE AND DIATRIZOATE SODIUM 30 ML: 660; 100 LIQUID ORAL; RECTAL at 10:04

## 2025-03-24 RX ADMIN — ACETAMINOPHEN 650 MG: 325 TABLET ORAL at 05:55

## 2025-03-24 RX ADMIN — SODIUM PHOSPHATE, DIBASIC AND SODIUM PHOSPHATE, MONOBASIC 1 ENEMA: 7; 19 ENEMA RECTAL at 14:41

## 2025-03-24 RX ADMIN — MAGNESIUM HYDROXIDE 30 ML: 2400 SUSPENSION ORAL at 15:36

## 2025-03-24 RX ADMIN — METOCLOPRAMIDE 10 MG: 5 INJECTION, SOLUTION INTRAMUSCULAR; INTRAVENOUS at 15:36

## 2025-03-24 RX ADMIN — METOCLOPRAMIDE 10 MG: 5 INJECTION, SOLUTION INTRAMUSCULAR; INTRAVENOUS at 10:28

## 2025-03-24 ASSESSMENT — PAIN DESCRIPTION - LOCATION
LOCATION: ABDOMEN
LOCATION: HEAD

## 2025-03-24 ASSESSMENT — PAIN SCALES - GENERAL: PAINLEVEL_OUTOF10: 5

## 2025-03-24 ASSESSMENT — PAIN DESCRIPTION - DESCRIPTORS: DESCRIPTORS: ACHING;CRAMPING

## 2025-03-24 NOTE — PROGRESS NOTES
[facility] Initial Discharge Assessment    Met with Patient to discuss discharge plan.        PCP: Milagros Huang PA-C                                  Date of Last Visit: \" In December\"    If no PCP, list provided? N/A    Discharge Planning    Living Arrangements: independently at home    Who do you live with? Spouse     Who helps you with your care:  self    If lives at home:     Do you have any barriers navigating in your home? no    Patient can perform ADL?  Yes    Current Services (outpatient and in home) :  None    Dialysis: No    Is transportation available to get to your appointments? Yes    DME Equipment:  no    Respiratory equipment: None    Respiratory provider:  no    Able to afford cost of medication: Yes    Does patient feel safe at home? Yes    Does patient identify any home going needs? No    Patient agreeable to HHC?      N/A    Patient agreeable to SNF/Rehab?     N/A    Other discharge needs identified?      N/A    Was Freedom of Choice Provided?      Yes    Initial Discharge Plan? (Note: please see concurrent daily documentation for any updates after initial note).      Chart reviewed.  Patient presented to [facility] ED complaining of abdominal pain.  Patient was evaluated in ED and admitted to medical floor for further observation of possible small bowel obstruction.  Patient's care discussed in daily quality rounds.  Patient is reported to be from home with spouse and independent for ADLS.    This  met with patient and spouse to discuss DC planning and help at home needs.  Patient reports plan to return home independently once medically stable for DC from [facility].  Patient and family identify no DC or SS needs at this time    Electronically signed by DELVIN Willingham on 3/24/2025 at 1:34 PM

## 2025-03-24 NOTE — ED NOTES
Dr. Alberto returned call to [facility] and accepts patient to [facility].

## 2025-03-24 NOTE — PROGRESS NOTES
Cele Palmer 69 years old female with past medical history of gastric bypass, hyperlipidemia, kidney stones, presented to the ED with abdominal pain.  Pain is mainly epigastric.  Has been having nausea but no vomiting.  Started the night prior and has not improved.  Feeling better after getting antiemetic in the ER.  Patient is admitted for hydration and monitoring.    CT scan abdomen showed multiple moderately distended segments of small bowel suggesting partial or early small bowel obstruction.    Patient was not seen or examined    Assessment and plan  Small bowel obstruction-partial early on CT scan.  Symptoms of abdominal pain and nausea.  History of gastric bypass in 2023.  Pain is better controlled and no vomiting currently.  IV hydration and monitor.  Antiemetics.  Keep n.p.o. overnight.  If symptoms improves, advance diet as tolerated.    Electronically signed by Isabel Alberto MD on 3/23/2025 at 9:24 PM

## 2025-03-24 NOTE — PROGRESS NOTES
Pt admitted to room 224, pt alert and oriented. Pt has abdominal pain 3/10 in abdomen. Pt independent in room, denies falls. Pt skin intact. Pt call light in reach. Pt vitals stable, pt placed on tele and running sinus misael. Pt currently NPO and pt is made aware, iv fluids infusing. Pt denies any needs at this time. Will continue to monitor pt.  Electronically signed by Maine Silver RN on 3/23/2025 at 11:25 PM

## 2025-03-24 NOTE — H&P
Hospital Medicine History & Physical      PCP: Milagros Huang PA-C    Date of Admission: 3/23/2025    Date of Service: 3/24/25      Chief Complaint:  nausea/vomiting/abd pain      History Of Present Illness:  69 y.o. female who presented to [facility] with above complains. Patient has past medical history of gastric bypass, hyperlipidemia, kidney stones, presented to the ED with abdominal pain. Pain is mainly epigastric x 2 days. Has been having nausea with vomiting once yesterday. Started the night prior and has not improved. Feeling better after getting antiemetic in the ER. Patient is admitted for hydration and monitoring. Had one BM 24 hrs ago, was passing flatus     Past Medical History:          Diagnosis Date    Acne     Asthma     Deep vein thrombosis (DVT) (HCC)     right leg    Depression     Herniated disc     L4-L5    Hip pain, left     Hyperlipidemia     Kidney stones     Overactive bladder     For yrs    Rash and nonspecific skin eruption     TMJ (temporomandibular joint disorder)        Past Surgical History:          Procedure Laterality Date    BUNIONECTOMY Right 2019     SECTION      CHOLECYSTECTOMY, LAPAROSCOPIC N/A 2021    LAPAROSCOPIC CHOLECYSTECTOMY  WITH CHOLANGIOGRAMS performed by Bal Javier MD at [facility] OR    HYSTERECTOMY (CERVIX STATUS UNKNOWN)      KIDNEY STONE SURGERY Left     LASIK Bilateral     JAXSON-EN-Y GASTRIC BYPASS N/A 2023    GASTRIC BYPASS JAXSON-EN-Y HIATAL HERNIA REPAIR LAPAROSCOPIC/ROBOTIC performed by Anthony Hammer MD at [facility] OR    STIMULATOR SURGERY N/A 2025    MEDTRONIC STAGE 1 AND STAGE 2 INTERSTIM performed by Arabella Gonzalez MD at [facility] OR    THYROID SURGERY      parathyroid    UPPER GASTROINTESTINAL ENDOSCOPY N/A 2023    EGD ESOPHAGOGASTRODUODENOSCOPY WITH BIOPSIES performed by Anthony Hammer MD at [facility] CENTER    UPPER GASTROINTESTINAL ENDOSCOPY N/A 2025    EGD ESOPHAGOGASTRODUODENOSCOPY

## 2025-03-24 NOTE — PROGRESS NOTES
PT manager talked with pt this AM. Pt reports no difficulty with walking, stairs, balance or dressing . Pt independent in room per nursing. No medical PT or OT evaluations needed at this time. Evaluations deferred.    Melissa Wolfe, PT [facility] Therapy Manager.

## 2025-03-25 VITALS
SYSTOLIC BLOOD PRESSURE: 119 MMHG | RESPIRATION RATE: 18 BRPM | TEMPERATURE: 97.9 F | WEIGHT: 155.2 LBS | HEART RATE: 51 BPM | HEIGHT: 62 IN | BODY MASS INDEX: 28.56 KG/M2 | DIASTOLIC BLOOD PRESSURE: 57 MMHG | OXYGEN SATURATION: 96 %

## 2025-03-25 LAB
ANION GAP SERPL CALCULATED.3IONS-SCNC: 8 MEQ/L (ref 9–15)
BASOPHILS # BLD: 0 K/UL (ref 0–0.1)
BASOPHILS NFR BLD: 0.7 % (ref 0.1–1.2)
BUN SERPL-MCNC: 11 MG/DL (ref 8–23)
CALCIUM SERPL-MCNC: 8.6 MG/DL (ref 8.5–9.9)
CHLORIDE SERPL-SCNC: 108 MEQ/L (ref 95–107)
CO2 SERPL-SCNC: 26 MEQ/L (ref 20–31)
CREAT SERPL-MCNC: 0.6 MG/DL (ref 0.5–0.9)
EOSINOPHIL # BLD: 0.2 K/UL (ref 0–0.4)
EOSINOPHIL NFR BLD: 5.7 % (ref 0.7–5.8)
ERYTHROCYTE [DISTWIDTH] IN BLOOD BY AUTOMATED COUNT: 13.4 % (ref 11.7–14.4)
GLUCOSE SERPL-MCNC: 72 MG/DL (ref 70–99)
HCT VFR BLD AUTO: 33.7 % (ref 37–47)
HGB BLD-MCNC: 11.1 G/DL (ref 11.2–15.7)
IMM GRANULOCYTES # BLD: 0 K/UL
IMM GRANULOCYTES NFR BLD: 0.2 %
LYMPHOCYTES # BLD: 1.5 K/UL (ref 1.2–3.7)
LYMPHOCYTES NFR BLD: 36.3 %
MCH RBC QN AUTO: 30.5 PG (ref 25.6–32.2)
MCHC RBC AUTO-ENTMCNC: 32.9 % (ref 32.2–35.5)
MCV RBC AUTO: 92.6 FL (ref 79.4–94.8)
MONOCYTES # BLD: 0.4 K/UL (ref 0.2–0.9)
MONOCYTES NFR BLD: 10.4 % (ref 4.7–12.5)
NEUTROPHILS # BLD: 2 K/UL (ref 1.6–6.1)
NEUTS SEG NFR BLD: 46.7 % (ref 34–71.1)
PLATELET # BLD AUTO: 123 K/UL (ref 182–369)
POTASSIUM SERPL-SCNC: 4 MEQ/L (ref 3.4–4.9)
RBC # BLD AUTO: 3.64 M/UL (ref 3.93–5.22)
SODIUM SERPL-SCNC: 142 MEQ/L (ref 135–144)
WBC # BLD AUTO: 4.2 K/UL (ref 4–10)

## 2025-03-25 PROCEDURE — 96376 TX/PRO/DX INJ SAME DRUG ADON: CPT

## 2025-03-25 PROCEDURE — G0378 HOSPITAL OBSERVATION PER HR: HCPCS

## 2025-03-25 PROCEDURE — 2580000003 HC RX 258: Performed by: INTERNAL MEDICINE

## 2025-03-25 PROCEDURE — 36415 COLL VENOUS BLD VENIPUNCTURE: CPT

## 2025-03-25 PROCEDURE — 96361 HYDRATE IV INFUSION ADD-ON: CPT

## 2025-03-25 PROCEDURE — 85025 COMPLETE CBC W/AUTO DIFF WBC: CPT

## 2025-03-25 PROCEDURE — 6360000002 HC RX W HCPCS: Performed by: INTERNAL MEDICINE

## 2025-03-25 PROCEDURE — 80048 BASIC METABOLIC PNL TOTAL CA: CPT

## 2025-03-25 RX ORDER — POLYETHYLENE GLYCOL 3350 17 G/17G
17 POWDER, FOR SOLUTION ORAL DAILY
Qty: 30 PACKET | Refills: 0 | Status: SHIPPED | OUTPATIENT
Start: 2025-03-25 | End: 2025-04-24

## 2025-03-25 RX ADMIN — PANTOPRAZOLE SODIUM 40 MG: 40 INJECTION, POWDER, LYOPHILIZED, FOR SOLUTION INTRAVENOUS at 07:57

## 2025-03-25 RX ADMIN — SODIUM CHLORIDE: 0.9 INJECTION, SOLUTION INTRAVENOUS at 02:23

## 2025-03-25 RX ADMIN — METOCLOPRAMIDE 10 MG: 5 INJECTION, SOLUTION INTRAMUSCULAR; INTRAVENOUS at 05:21

## 2025-03-25 ASSESSMENT — PAIN SCALES - GENERAL: PAINLEVEL_OUTOF10: 0

## 2025-03-25 NOTE — PLAN OF CARE
Problem: Chronic Conditions and Co-morbidities  Goal: Patient's chronic conditions and co-morbidity symptoms are monitored and maintained or improved  3/25/2025 0946 by Soraya Diaz, RN  Outcome: Progressing  3/24/2025 2319 by Maine Silver, RN  Outcome: Progressing

## 2025-03-25 NOTE — DISCHARGE SUMMARY
Discharge Summary    Patient:  Cele Palmer  YOB: 1955    MRN: [id number]   Acct: [id number]    Primary Care Physician: Milagros Huang PA-C    Admit date:  3/23/2025    Discharge date:   03/25/25      Discharge Diagnoses:   SBO (small bowel obstruction) (HCC)  Principal Problem:    SBO (small bowel obstruction) (HCC)  Resolved Problems:    * No resolved hospital problems. *      Admitted for: (HPI)above    Hospital Course: patient was admitted with abd pain/nausea, suspected SBO were ruled out, had BM, tolerated PO intake and after completing her acute stay will be DC home in stable condition     Consultants:      Discharge Medications:       Medication List        START taking these medications      polyethylene glycol 17 g packet  Commonly known as: GLYCOLAX  Take 1 packet by mouth daily            CONTINUE taking these medications      acetaminophen 500 MG tablet  Commonly known as: TYLENOL  Take 2 tablets by mouth every 6 hours as needed for Pain     BARIATRIC MULTIVITAMINS/IRON PO     CALCIUM PO     * docusate sodium 100 MG capsule  Commonly known as: Colace  Take 1 capsule by mouth 2 times daily     * docusate sodium 100 MG capsule  Commonly known as: COLACE  Take 1 capsule by mouth 2 times daily     escitalopram 20 MG tablet  Commonly known as: Lexapro  Take 1 tablet by mouth daily     melatonin 3 MG Tabs tablet     pantoprazole 40 MG tablet  Commonly known as: PROTONIX  TAKE 1 TABLET EVERY MORNING BEFORE BREAKFAST     pramipexole 0.125 MG tablet  Commonly known as: MIRAPEX  TAKE 1 TABLET NIGHTLY     traMADol 50 MG tablet  Commonly known as: ULTRAM           * This list has 2 medication(s) that are the same as other medications prescribed for you. Read the directions carefully, and ask your doctor or other care provider to review them with you.                STOP taking these medications      midodrine 2.5 MG tablet  Commonly known as: PROAMATINE               Where to Get Your Medications

## 2025-03-25 NOTE — PROGRESS NOTES
AVS reviewed with pt and pt stated understanding. Follow up appointments will be made by pt. Medications reviewed in detail and pt stated understanding. Saline lock removed prior to discharge. All belongings gathered by pt and left unit with them. Pt will leave unit via ambulation with staff.

## 2025-03-27 ENCOUNTER — OFFICE VISIT (OUTPATIENT)
Dept: BARIATRICS/WEIGHT MGMT | Age: 70
End: 2025-03-27
Payer: COMMERCIAL

## 2025-03-27 VITALS
OXYGEN SATURATION: 98 % | DIASTOLIC BLOOD PRESSURE: 70 MMHG | HEIGHT: 62 IN | WEIGHT: 147.36 LBS | BODY MASS INDEX: 27.12 KG/M2 | HEART RATE: 47 BPM | SYSTOLIC BLOOD PRESSURE: 130 MMHG

## 2025-03-27 DIAGNOSIS — K59.09 OTHER CONSTIPATION: Primary | ICD-10-CM

## 2025-03-27 DIAGNOSIS — Z98.84 BARIATRIC SURGERY STATUS: ICD-10-CM

## 2025-03-27 PROCEDURE — 3017F COLORECTAL CA SCREEN DOC REV: CPT | Performed by: SURGERY

## 2025-03-27 PROCEDURE — 1090F PRES/ABSN URINE INCON ASSESS: CPT | Performed by: SURGERY

## 2025-03-27 PROCEDURE — 1036F TOBACCO NON-USER: CPT | Performed by: SURGERY

## 2025-03-27 PROCEDURE — G8427 DOCREV CUR MEDS BY ELIG CLIN: HCPCS | Performed by: SURGERY

## 2025-03-27 PROCEDURE — 1111F DSCHRG MED/CURRENT MED MERGE: CPT | Performed by: SURGERY

## 2025-03-27 PROCEDURE — 1123F ACP DISCUSS/DSCN MKR DOCD: CPT | Performed by: SURGERY

## 2025-03-27 PROCEDURE — G8417 CALC BMI ABV UP PARAM F/U: HCPCS | Performed by: SURGERY

## 2025-03-27 PROCEDURE — G8399 PT W/DXA RESULTS DOCUMENT: HCPCS | Performed by: SURGERY

## 2025-03-27 PROCEDURE — 99215 OFFICE O/P EST HI 40 MIN: CPT | Performed by: SURGERY

## 2025-03-27 RX ORDER — DOCUSATE SODIUM 100 MG/1
100 CAPSULE, LIQUID FILLED ORAL 2 TIMES DAILY
Qty: 90 CAPSULE | Refills: 3 | Status: SHIPPED | OUTPATIENT
Start: 2025-03-27 | End: 2025-09-23

## 2025-03-27 RX ORDER — ALUMINUM ZIRCONIUM OCTACHLOROHYDREX GLY 16 G/100G
GEL TOPICAL
Qty: 1040 G | Refills: 3 | Status: SHIPPED | OUTPATIENT
Start: 2025-03-27

## 2025-03-27 NOTE — PATIENT INSTRUCTIONS
Avoid narcotic use to avoid severe constipation.      Follow up in 2 months for your 18 month post op visit.        THE BIG FOUR GUIDELINES:    1.  Count calories!  People who count calories eat less.  Use the daily plate or other apps for your smart phone or computer.  The more you count the more of an expert you will become and will get easier and easier.  If you are trying to lose weight and exercising a lot, keep calories to around the thousand to 1200 a day.  If you are not exercising consistently try to limit them to around 800 a day.    2.  Separate liquids and solids.  Wait 30 minutes to an hour after you eat before drinking liquids.  This will reduce the total amount of food you eat in the meal.    3.  Exercise!  Optimally,  up to 5 hours a week with a combination of cardio and resistance or weight training will dramatically help to keep excess body fat off.  But any bit of exercise makes a big, long-term difference.    4.  Sleep!   Try to get 7 or more hours of sleep a night.  If you have obstructive sleep apnea definitely use your CPAP machine.    THE RULE OF 20'S:  For every meal, chew each bite 20 seconds.  Then put the fork down and wait 20 seconds after you swallow before picking up the fork again.  Each meal should take at least 20 minutes so your brain can register that you are full.    If you are happy with your care, please go  to [email or web address]  and/or [facility] Reviews and leave a review for Anthony Hammer MD.  The charitable nature of our practice makes a marketing budget a challenge and your review could help us help more people.

## 2025-03-27 NOTE — PROGRESS NOTES
Post Op Clinic Visit      Cele Palmer  is 16 months  post-op from Robotic Mami en Y gastric bypass.  She is following up today after a recent [facility] ER visit for abdominal pain. A CT scan demonstrated a mild small bowel ileus, no obstruction, and a markedly stool-packed colon.  A subsequent UGI-SBFT was normal except for the stool-packed colon.      Cele uses narcotics chronically for back pain.          Physical Exam:    /70   Pulse (!) 47   Ht 1.575 m (5' 2\")   Wt 66.8 kg (147 lb 5.8 oz)   SpO2 98%   BMI 26.95 kg/m²     General: No acute distress  HEENT: P PERRLA, no scleral icterus.  Trachea midline.  Thoracic: Clear to auscultation bilaterally.  Cardiac: Regular rate and rhythm with no rubs clicks or murmurs.  Abdomen: The incisions are healing well.There are no hernias    Extremities: No clubbing cyanosis or edema.  Neurologic: No gross motor or sensory defects.        Assessment and Plan:      Cele Palmer  is 16 months  post-op from Robotic Mami en Y gastric bypass.  She is following up today after a recent [facility] ER visit for abdominal pain. A CT scan demonstrated a mild small bowel ileus, no obstruction, and a markedly stool-packed colon.  A subsequent UGI-SBFT was normal except for the stool-packed colon.      Cele uses narcotics chronically for back pain.  Cele requested Tramadol today which I declined.  We discussed the pain of colonic constipation and how it is exacerbated by narcotics, even an agonist/antagonist like Tramadol.  I recommended the followin.  Complete colyte bowel prep (she reports the UGI-SBFT contrast caused copious diarrhea for a couple days)      2.  Take 1 scoop of Metamucil in 12 oz. Of water daily and drink in 10 minutes.      3. Drink a minimum of 64 oz. Of water a day.      4.  Add a shot of Goodbelly probiotic drink to 8 oz. Of water and drink daily.        Cele has lost 56 pounds and 100% of her excess weight.  I requested she get her yearly bariatric

## 2025-04-08 ENCOUNTER — OFFICE VISIT (OUTPATIENT)
Dept: OBGYN CLINIC | Age: 70
End: 2025-04-08

## 2025-04-08 ENCOUNTER — HOSPITAL ENCOUNTER (OUTPATIENT)
Dept: GENERAL RADIOLOGY | Age: 70
Discharge: HOME | End: 2025-04-10
Attending: OBSTETRICS & GYNECOLOGY
Payer: COMMERCIAL

## 2025-04-08 ENCOUNTER — HOSPITAL ENCOUNTER (OUTPATIENT)
Age: 70
Discharge: HOME | End: 2025-04-10
Payer: COMMERCIAL

## 2025-04-08 VITALS
WEIGHT: 146 LBS | HEART RATE: 64 BPM | SYSTOLIC BLOOD PRESSURE: 102 MMHG | HEIGHT: 62 IN | DIASTOLIC BLOOD PRESSURE: 66 MMHG | BODY MASS INDEX: 26.87 KG/M2

## 2025-04-08 DIAGNOSIS — N32.81 OAB (OVERACTIVE BLADDER): ICD-10-CM

## 2025-04-08 DIAGNOSIS — Z09 POSTOP CHECK: Primary | ICD-10-CM

## 2025-04-08 LAB
BILIRUB UR QL STRIP: NEGATIVE
CLARITY UR: CLEAR
COLOR UR: YELLOW
GLUCOSE UR STRIP-MCNC: NEGATIVE MG/DL
HGB UR QL STRIP: NEGATIVE
KETONES UR STRIP-MCNC: NEGATIVE MG/DL
LEUKOCYTE ESTERASE UR QL STRIP: NEGATIVE
NITRITE UR QL STRIP: NEGATIVE
PH UR STRIP: 5.5 [PH] (ref 5–9)
PROT UR STRIP-MCNC: NEGATIVE MG/DL
SP GR UR STRIP: 1.01 (ref 1–1.03)
UROBILINOGEN UR STRIP-ACNC: 0.2 E.U./DL

## 2025-04-08 PROCEDURE — 72220 X-RAY EXAM SACRUM TAILBONE: CPT

## 2025-04-08 RX ORDER — MIRABEGRON 50 MG/1
50 TABLET, FILM COATED, EXTENDED RELEASE ORAL DAILY
Qty: 30 TABLET | Refills: 5 | Status: SHIPPED | OUTPATIENT
Start: 2025-04-08 | End: 2025-04-10 | Stop reason: SDUPTHER

## 2025-04-09 LAB — BACTERIA UR CULT: NORMAL

## 2025-04-10 RX ORDER — VIBEGRON 75 MG/1
75 TABLET, FILM COATED ORAL DAILY
Qty: 90 TABLET | Refills: 0 | OUTPATIENT
Start: 2025-04-10

## 2025-04-10 RX ORDER — MIRABEGRON 50 MG/1
50 TABLET, FILM COATED, EXTENDED RELEASE ORAL DAILY
Qty: 30 TABLET | Refills: 5 | Status: SHIPPED | OUTPATIENT
Start: 2025-04-10

## 2025-04-10 RX ORDER — NITROFURANTOIN 25; 75 MG/1; MG/1
100 CAPSULE ORAL 2 TIMES DAILY
Refills: 0 | OUTPATIENT
Start: 2025-04-10

## 2025-04-10 RX ORDER — MIRABEGRON 50 MG/1
50 TABLET, FILM COATED, EXTENDED RELEASE ORAL DAILY
Qty: 90 TABLET | Refills: 0 | OUTPATIENT
Start: 2025-04-10

## 2025-04-10 RX ORDER — MIRABEGRON 50 MG/1
50 TABLET, FILM COATED, EXTENDED RELEASE ORAL DAILY
Qty: 30 TABLET | Refills: 5 | Status: SHIPPED | OUTPATIENT
Start: 2025-04-10 | End: 2025-04-10 | Stop reason: SDUPTHER

## 2025-04-16 ENCOUNTER — OFFICE VISIT (OUTPATIENT)
Dept: OBGYN CLINIC | Age: 70
End: 2025-04-16
Payer: COMMERCIAL

## 2025-04-16 VITALS
WEIGHT: 148 LBS | SYSTOLIC BLOOD PRESSURE: 100 MMHG | DIASTOLIC BLOOD PRESSURE: 62 MMHG | HEART RATE: 98 BPM | BODY MASS INDEX: 27.07 KG/M2

## 2025-04-16 DIAGNOSIS — N32.81 OAB (OVERACTIVE BLADDER): Primary | ICD-10-CM

## 2025-04-16 DIAGNOSIS — N39.46 MIXED INCONTINENCE: ICD-10-CM

## 2025-04-16 PROCEDURE — 1090F PRES/ABSN URINE INCON ASSESS: CPT | Performed by: OBSTETRICS & GYNECOLOGY

## 2025-04-16 PROCEDURE — 3017F COLORECTAL CA SCREEN DOC REV: CPT | Performed by: OBSTETRICS & GYNECOLOGY

## 2025-04-16 PROCEDURE — 0509F URINE INCON PLAN DOCD: CPT | Performed by: OBSTETRICS & GYNECOLOGY

## 2025-04-16 PROCEDURE — 1111F DSCHRG MED/CURRENT MED MERGE: CPT | Performed by: OBSTETRICS & GYNECOLOGY

## 2025-04-16 PROCEDURE — G8417 CALC BMI ABV UP PARAM F/U: HCPCS | Performed by: OBSTETRICS & GYNECOLOGY

## 2025-04-16 PROCEDURE — 95971 ALYS SMPL SP/PN NPGT W/PRGRM: CPT | Performed by: OBSTETRICS & GYNECOLOGY

## 2025-04-16 PROCEDURE — 1036F TOBACCO NON-USER: CPT | Performed by: OBSTETRICS & GYNECOLOGY

## 2025-04-16 PROCEDURE — G8427 DOCREV CUR MEDS BY ELIG CLIN: HCPCS | Performed by: OBSTETRICS & GYNECOLOGY

## 2025-04-16 PROCEDURE — 99213 OFFICE O/P EST LOW 20 MIN: CPT | Performed by: OBSTETRICS & GYNECOLOGY

## 2025-04-16 PROCEDURE — G8399 PT W/DXA RESULTS DOCUMENT: HCPCS | Performed by: OBSTETRICS & GYNECOLOGY

## 2025-04-16 PROCEDURE — 1123F ACP DISCUSS/DSCN MKR DOCD: CPT | Performed by: OBSTETRICS & GYNECOLOGY

## 2025-04-23 NOTE — PROGRESS NOTES
Food Insecurity: No Food Insecurity (3/23/2025)    Hunger Vital Sign     Worried About Running Out of Food in the Last Year: Never true     Ran Out of Food in the Last Year: Never true   Transportation Needs: No Transportation Needs (3/23/2025)    PRAPARE - Transportation     Lack of Transportation (Medical): No     Lack of Transportation (Non-Medical): No   Physical Activity: Insufficiently Active (1/30/2023)    Exercise Vital Sign     Days of Exercise per Week: 3 days     Minutes of Exercise per Session: 40 min   Stress: Not on file   Social Connections: Not on file   Intimate Partner Violence: Not on file   Housing Stability: Low Risk  (3/23/2025)    Housing Stability Vital Sign     Unable to Pay for Housing in the Last Year: No     Number of Times Moved in the Last Year: 0     Homeless in the Last Year: No         Current Outpatient Medications   Medication Instructions    acetaminophen (TYLENOL) 1,000 mg, Oral, EVERY 6 HOURS PRN    CALCIUM PO Take by mouth    docusate sodium (COLACE) 100 mg, Oral, 2 TIMES DAILY    docusate sodium (COLACE) 100 mg, Oral, 2 TIMES DAILY    docusate sodium (COLACE) 100 mg, Oral, 2 TIMES DAILY    escitalopram (LEXAPRO) 20 mg, Oral, DAILY    melatonin 3 mg, DAILY    mirabegron (MYRBETRIQ) 50 mg, Oral, DAILY    Multiple Vitamins-Minerals (BARIATRIC MULTIVITAMINS/IRON PO) Take by mouth    pantoprazole (PROTONIX) 40 MG tablet TAKE 1 TABLET EVERY MORNING BEFORE BREAKFAST    polyethylene glycol (GLYCOLAX) 17 g, Oral, DAILY    pramipexole (MIRAPEX) 0.125 mg, Oral, NIGHTLY    psyllium (METAMUCIL SMOOTH TEXTURE) 58.6 % powder Place one scoop in 12 oz of water and drink daily.       Patient's medications, allergies, past medical, surgical, social and family histories were reviewed and updated as appropriate.    Review of Systems  As per chief complaint   All other systems reviewed and are negative.    Physical Exam:  Vitals:  /62   Pulse 98   Wt 67.1 kg (148 lb)   BMI 27.07 kg/m²

## 2025-05-01 ENCOUNTER — OFFICE VISIT (OUTPATIENT)
Age: 70
End: 2025-05-01
Payer: COMMERCIAL

## 2025-05-01 ENCOUNTER — COMMUNITY OUTREACH (OUTPATIENT)
Age: 70
End: 2025-05-01

## 2025-05-01 VITALS
BODY MASS INDEX: 27.31 KG/M2 | DIASTOLIC BLOOD PRESSURE: 60 MMHG | WEIGHT: 148.4 LBS | HEART RATE: 56 BPM | HEIGHT: 62 IN | RESPIRATION RATE: 16 BRPM | SYSTOLIC BLOOD PRESSURE: 102 MMHG | OXYGEN SATURATION: 96 %

## 2025-05-01 DIAGNOSIS — M25.551 CHRONIC RIGHT HIP PAIN: ICD-10-CM

## 2025-05-01 DIAGNOSIS — M85.851 OSTEOPENIA OF RIGHT HIP: Primary | ICD-10-CM

## 2025-05-01 DIAGNOSIS — Z98.84 S/P BARIATRIC SURGERY: ICD-10-CM

## 2025-05-01 DIAGNOSIS — G89.29 CHRONIC RIGHT HIP PAIN: ICD-10-CM

## 2025-05-01 PROCEDURE — 90715 TDAP VACCINE 7 YRS/> IM: CPT | Performed by: FAMILY MEDICINE

## 2025-05-01 PROCEDURE — 3017F COLORECTAL CA SCREEN DOC REV: CPT | Performed by: FAMILY MEDICINE

## 2025-05-01 PROCEDURE — 90471 IMMUNIZATION ADMIN: CPT | Performed by: FAMILY MEDICINE

## 2025-05-01 PROCEDURE — 83036 HEMOGLOBIN GLYCOSYLATED A1C: CPT | Performed by: FAMILY MEDICINE

## 2025-05-01 PROCEDURE — 1090F PRES/ABSN URINE INCON ASSESS: CPT | Performed by: FAMILY MEDICINE

## 2025-05-01 PROCEDURE — 99213 OFFICE O/P EST LOW 20 MIN: CPT | Performed by: FAMILY MEDICINE

## 2025-05-01 PROCEDURE — 1123F ACP DISCUSS/DSCN MKR DOCD: CPT | Performed by: FAMILY MEDICINE

## 2025-05-01 PROCEDURE — 1036F TOBACCO NON-USER: CPT | Performed by: FAMILY MEDICINE

## 2025-05-01 PROCEDURE — G8399 PT W/DXA RESULTS DOCUMENT: HCPCS | Performed by: FAMILY MEDICINE

## 2025-05-01 PROCEDURE — G8417 CALC BMI ABV UP PARAM F/U: HCPCS | Performed by: FAMILY MEDICINE

## 2025-05-01 PROCEDURE — G8427 DOCREV CUR MEDS BY ELIG CLIN: HCPCS | Performed by: FAMILY MEDICINE

## 2025-05-01 RX ORDER — METHOCARBAMOL 500 MG/1
500 TABLET, FILM COATED ORAL 2 TIMES DAILY
Qty: 20 TABLET | Refills: 2 | Status: SHIPPED | OUTPATIENT
Start: 2025-05-01 | End: 2025-05-31

## 2025-05-01 NOTE — PROGRESS NOTES
reviewed.    Objective      Physical Exam  Vitals:    05/01/25 1109   BP: 102/60   BP Site: Right Upper Arm   Patient Position: Sitting   BP Cuff Size: Medium Adult   Pulse: 56   Resp: 16   SpO2: 96%   Weight: 67.3 kg (148 lb 6.4 oz)   Height: 1.575 m (5' 2\")     Constitutional:       Appearance: Normal appearance.   HENT:      Head: Normocephalic and atraumatic.      Nose: Nose normal.      Mouth: Mucous membranes are moist.   Eyes:      Extraocular Movements: Extraocular movements intact.      Pupils: Pupils are equal, round, and reactive to light.   Cardiovascular:      Rate and Rhythm: Normal rate and regular rhythm.      Heart sounds: faint systolic murmur.   Pulmonary:      Effort: Pulmonary effort is normal.      Breath sounds: Normal breath sounds.   Abdominal:      Palpations: Abdomen is soft.   Musculoskeletal:      Cervical back: Normal range of motion and neck supple.   Skin:     General: Skin is warm and dry.   Neurological:      General: No focal deficit present.      Mental Status: Alert.          Labs    Lab Results   Component Value Date    CHOL 108 04/10/2024    CHOL 168 02/03/2023    CHOL 136 06/21/2021     Lab Results   Component Value Date    TRIG 51 04/10/2024    TRIG 84 02/03/2023    TRIG 91 06/21/2021     Lab Results   Component Value Date    HDL 61 (H) 09/30/2024    HDL 51 06/28/2024    HDL 49 04/10/2024     No components found for: \"LDLCHOLESTEROL\", \"LDLCALC\"  No results found for: \"VLDL\"  Lab Results   Component Value Date    CHOLHDLRATIO 3.7 05/17/2012    CHOLHDLRATIO 2.3 07/05/2011        No components found for: \"LABMICR\", \"[id number]\"    Lab Results   Component Value Date    LABA1C 5.5 03/05/2024    LABA1C 5.9 08/31/2023    LABA1C 5.6 02/03/2023       Lab Results   Component Value Date     03/25/2025    K 4.0 03/25/2025     (H) 03/25/2025    CO2 26 03/25/2025    BUN 11 03/25/2025    CREATININE 0.60 03/25/2025    GLUCOSE 72 03/25/2025    CALCIUM 8.6 03/25/2025    BILITOT 0.8

## 2025-05-05 DIAGNOSIS — I95.1 ORTHOSTATIC HYPOTENSION: ICD-10-CM

## 2025-05-05 RX ORDER — MIDODRINE HYDROCHLORIDE 2.5 MG/1
2.5 TABLET ORAL
Qty: 90 TABLET | Refills: 3 | Status: SHIPPED | OUTPATIENT
Start: 2025-05-05

## 2025-05-05 NOTE — TELEPHONE ENCOUNTER
Comments:     Last Office Visit (last PCP visit):   9/30/2024    Next Visit Date:  Future Appointments   Date Time Provider Department Center   5/7/2025 11:15 AM Arabella Gonzalez MD [facility]   5/23/2025  1:00 PM Anthony Hammer MD [facility]   5/23/2025  1:30 PM Jesusita Fragoso RD [facility]         Rx requested:  Requested Prescriptions     Pending Prescriptions Disp Refills    midodrine (PROAMATINE) 2.5 MG tablet [Pharmacy Med Name: MIDODRINE HCL TABS 2.5MG] 90 tablet 3     Sig: Take 1 tablet by mouth daily (with breakfast)

## 2025-05-09 ENCOUNTER — OFFICE VISIT (OUTPATIENT)
Dept: OBGYN CLINIC | Age: 70
End: 2025-05-09
Payer: COMMERCIAL

## 2025-05-09 VITALS
HEIGHT: 62 IN | WEIGHT: 150 LBS | BODY MASS INDEX: 27.6 KG/M2 | HEART RATE: 64 BPM | SYSTOLIC BLOOD PRESSURE: 122 MMHG | DIASTOLIC BLOOD PRESSURE: 76 MMHG

## 2025-05-09 DIAGNOSIS — N32.81 OAB (OVERACTIVE BLADDER): Primary | ICD-10-CM

## 2025-05-09 PROCEDURE — G8427 DOCREV CUR MEDS BY ELIG CLIN: HCPCS | Performed by: OBSTETRICS & GYNECOLOGY

## 2025-05-09 PROCEDURE — 3017F COLORECTAL CA SCREEN DOC REV: CPT | Performed by: OBSTETRICS & GYNECOLOGY

## 2025-05-09 PROCEDURE — 1090F PRES/ABSN URINE INCON ASSESS: CPT | Performed by: OBSTETRICS & GYNECOLOGY

## 2025-05-09 PROCEDURE — 1036F TOBACCO NON-USER: CPT | Performed by: OBSTETRICS & GYNECOLOGY

## 2025-05-09 PROCEDURE — 1123F ACP DISCUSS/DSCN MKR DOCD: CPT | Performed by: OBSTETRICS & GYNECOLOGY

## 2025-05-09 PROCEDURE — G8399 PT W/DXA RESULTS DOCUMENT: HCPCS | Performed by: OBSTETRICS & GYNECOLOGY

## 2025-05-09 PROCEDURE — 99213 OFFICE O/P EST LOW 20 MIN: CPT | Performed by: OBSTETRICS & GYNECOLOGY

## 2025-05-09 PROCEDURE — G8417 CALC BMI ABV UP PARAM F/U: HCPCS | Performed by: OBSTETRICS & GYNECOLOGY

## 2025-05-09 RX ORDER — MIRABEGRON 50 MG/1
50 TABLET, FILM COATED, EXTENDED RELEASE ORAL DAILY
Qty: 30 TABLET | Refills: 5 | Status: SHIPPED | OUTPATIENT
Start: 2025-05-09

## 2025-05-12 ENCOUNTER — HOSPITAL ENCOUNTER (OUTPATIENT)
Dept: LAB | Age: 70
Discharge: HOME | End: 2025-05-12
Payer: COMMERCIAL

## 2025-05-12 DIAGNOSIS — Z98.84 BARIATRIC SURGERY STATUS: ICD-10-CM

## 2025-05-12 LAB
ALBUMIN SERPL-MCNC: 4.1 G/DL (ref 3.5–4.6)
ERYTHROCYTE [DISTWIDTH] IN BLOOD BY AUTOMATED COUNT: 13.2 % (ref 11.5–14.5)
HCT VFR BLD AUTO: 40.6 % (ref 37–47)
HGB BLD-MCNC: 13.1 G/DL (ref 12–16)
MCH RBC QN AUTO: 30.5 PG (ref 27–31.3)
MCHC RBC AUTO-ENTMCNC: 32.3 % (ref 33–37)
MCV RBC AUTO: 94.4 FL (ref 79.4–94.8)
PLATELET # BLD AUTO: 188 K/UL (ref 130–400)
PTH-INTACT SERPL-MCNC: 66.2 PG/ML (ref 15–65)
RBC # BLD AUTO: 4.3 M/UL (ref 4.2–5.4)
WBC # BLD AUTO: 4.4 K/UL (ref 4.8–10.8)

## 2025-05-12 PROCEDURE — 83540 ASSAY OF IRON: CPT

## 2025-05-12 PROCEDURE — 82607 VITAMIN B-12: CPT

## 2025-05-12 PROCEDURE — 82306 VITAMIN D 25 HYDROXY: CPT

## 2025-05-12 PROCEDURE — 82330 ASSAY OF CALCIUM: CPT

## 2025-05-12 PROCEDURE — 83036 HEMOGLOBIN GLYCOSYLATED A1C: CPT

## 2025-05-12 PROCEDURE — 84425 ASSAY OF VITAMIN B-1: CPT

## 2025-05-12 PROCEDURE — 83550 IRON BINDING TEST: CPT

## 2025-05-12 PROCEDURE — 36415 COLL VENOUS BLD VENIPUNCTURE: CPT

## 2025-05-12 PROCEDURE — 83970 ASSAY OF PARATHORMONE: CPT

## 2025-05-12 PROCEDURE — 85027 COMPLETE CBC AUTOMATED: CPT

## 2025-05-12 PROCEDURE — 82040 ASSAY OF SERUM ALBUMIN: CPT

## 2025-05-13 LAB
ESTIMATED AVERAGE GLUCOSE: 97 MG/DL
HBA1C MFR BLD: 5 % (ref 4–6)
IRON % SATURATION: 34 % (ref 20–55)
IRON: 101 UG/DL (ref 37–145)
TOTAL IRON BINDING CAPACITY: 301 UG/DL (ref 250–450)
UNSATURATED IRON BINDING CAPACITY: 200 UG/DL (ref 112–347)
VITAMIN B-12: 1562 PG/ML (ref 232–1245)
VITAMIN D 25-HYDROXY: 58.3 NG/ML (ref 30–100)

## 2025-05-14 LAB
CA-I ADJ PH7.4 SERPL-SCNC: 1.24 MMOL/L (ref 1.09–1.3)
CA-I SERPL ISE-SCNC: 1.26 MMOL/L (ref 1.09–1.3)

## 2025-05-14 NOTE — PROGRESS NOTES
status: Never Used   Substance and Sexual Activity    Alcohol use: Not Currently     Comment: Rarely    Drug use: Never    Sexual activity: Yes     Partners: Male   Other Topics Concern    Not on file   Social History Narrative    Not on file     Social Drivers of Health     Financial Resource Strain: Low Risk  (6/25/2024)    Overall Financial Resource Strain (CARDIA)     Difficulty of Paying Living Expenses: Not very hard   Food Insecurity: No Food Insecurity (3/23/2025)    Hunger Vital Sign     Worried About Running Out of Food in the Last Year: Never true     Ran Out of Food in the Last Year: Never true   Transportation Needs: No Transportation Needs (3/23/2025)    PRAPARE - Transportation     Lack of Transportation (Medical): No     Lack of Transportation (Non-Medical): No   Physical Activity: Insufficiently Active (1/30/2023)    Exercise Vital Sign     Days of Exercise per Week: 3 days     Minutes of Exercise per Session: 40 min   Stress: Not on file   Social Connections: Not on file   Intimate Partner Violence: Not on file   Housing Stability: Low Risk  (3/23/2025)    Housing Stability Vital Sign     Unable to Pay for Housing in the Last Year: No     Number of Times Moved in the Last Year: 0     Homeless in the Last Year: No         Current Outpatient Medications   Medication Instructions    acetaminophen (TYLENOL) 1,000 mg, Oral, EVERY 6 HOURS PRN    CALCIUM PO Take by mouth    docusate sodium (COLACE) 100 mg, Oral, 2 TIMES DAILY    docusate sodium (COLACE) 100 mg, Oral, 2 TIMES DAILY    docusate sodium (COLACE) 100 mg, Oral, 2 TIMES DAILY    escitalopram (LEXAPRO) 20 mg, Oral, DAILY    melatonin 3 mg, DAILY    methocarbamol (ROBAXIN) 500 mg, Oral, 2 times daily    midodrine (PROAMATINE) 2.5 mg, Oral, DAILY WITH BREAKFAST    mirabegron (MYRBETRIQ) 50 mg, Oral, DAILY    mirabegron (MYRBETRIQ) 50 mg, Oral, DAILY    Multiple Vitamins-Minerals (BARIATRIC MULTIVITAMINS/IRON PO) Take by mouth    pantoprazole

## 2025-05-16 LAB — VIT B1 SERPL-MCNC: 25 NMOL/L (ref 4–15)

## 2025-05-23 ENCOUNTER — OFFICE VISIT (OUTPATIENT)
Age: 70
End: 2025-05-23

## 2025-05-23 VITALS — HEIGHT: 63 IN | WEIGHT: 145 LBS | BODY MASS INDEX: 25.69 KG/M2

## 2025-05-23 VITALS
SYSTOLIC BLOOD PRESSURE: 110 MMHG | DIASTOLIC BLOOD PRESSURE: 74 MMHG | WEIGHT: 145 LBS | HEART RATE: 74 BPM | OXYGEN SATURATION: 98 % | HEIGHT: 62 IN | BODY MASS INDEX: 26.68 KG/M2

## 2025-05-23 DIAGNOSIS — E66.3 OVERWEIGHT (BMI 25.0-29.9): Primary | ICD-10-CM

## 2025-05-23 DIAGNOSIS — Z98.84 S/P GASTRIC BYPASS: ICD-10-CM

## 2025-05-23 DIAGNOSIS — Z98.84 BARIATRIC SURGERY STATUS: Primary | ICD-10-CM

## 2025-05-23 DIAGNOSIS — Z71.3 DIETARY COUNSELING AND SURVEILLANCE: ICD-10-CM

## 2025-05-23 NOTE — PROGRESS NOTES
Medical Nutrition Therapy  [facility]    Patient here for her 18 month post op Mami en Y gastric bypass.         5/23/2025  Height:   Ht Readings from Last 1 Encounters:   05/23/25 1.588 m (5' 2.5\")     Weight:   Wt Readings from Last 1 Encounters:   05/23/25 65.8 kg (145 lb)     BMI:   BMI Readings from Last 1 Encounters:   05/23/25 26.10 kg/m²       Pre-Surgery Weight: 201 lbs  TBW lost: 56 lbs  % TBW lost 28 %  % EBW lost: 90 %    Patient has lost a total of 89 lbs from start of program.    Labs reviewed: mildly elevated PTH    Intolerances/Difficulties: none    Vitamins:  Bariatric MVI with iron  and Calcium Citrate 1000-1500mg/day    Protein goal: met, 70-80 grams/day    Fluid goal: met, 64 oz/day     Exercise: cardio and strength training 4 days/week    Nutrition Diagnosis:  PES: No nutrition diagnosis at this time      Nutrition Intervention:  Education provided to include review of post surgical diet progression, meal timing, protein and fluid intake, vitamins, and exercise.     Nutrition Monitoring and evaluation:  Protein intake: 75-90 grams/day (1.5-1.8gr/kg IBW)  Fluid intake: 64-90oz sugar free, calorie free, caffeine free, non carbonated beverages  Vitamins: Bariatric MVI with iron,  Calcium citrate 1000-1500mg/day in divided doses  Exercise: 45-60 minutes of exercise 5 days/week, including 3 days of strength training    Plan/Recommendation:   Continue current intake and exercise    Time spent with patient 15 minutes    Follow up per program protocol    Jesusita Fragoso RD

## 2025-05-23 NOTE — PATIENT INSTRUCTIONS
Follow up in 6 months.      THE BIG FOUR GUIDELINES:    1.  Count calories!  People who count calories eat less.  Use the daily plate or other apps for your smart phone or computer.  The more you count the more of an expert you will become and will get easier and easier.  If you are trying to lose weight and exercising a lot, keep calories to around the thousand to 1200 a day.  If you are not exercising consistently try to limit them to around 800 a day.    2.  Separate liquids and solids.  Wait 30 minutes to an hour after you eat before drinking liquids.  This will reduce the total amount of food you eat in the meal.    3.  Exercise!  Optimally,  up to 5 hours a week with a combination of cardio and resistance or weight training will dramatically help to keep excess body fat off.  But any bit of exercise makes a big, long-term difference.    4.  Sleep!   Try to get 7 or more hours of sleep a night.  If you have obstructive sleep apnea definitely use your CPAP machine.    THE RULE OF 20'S:  For every meal, chew each bite 20 seconds.  Then put the fork down and wait 20 seconds after you swallow before picking up the fork again.  Each meal should take at least 20 minutes so your brain can register that you are full.    If you are happy with your care, please go  to [email or web address]  and/or [employer] and leave a review for Anthony Hammer MD.  The charitable nature of our practice makes a marketing budget a challenge and your review could help us help more people.

## 2025-05-23 NOTE — PROGRESS NOTES
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   Abdominal pain has been absent.      Weight:       Wt Readings from Last 1 Encounters:   05/23/25 65.8 kg (145 lb)      BMI:       BMI Readings from Last 1 Encounters:   05/23/25 26.10 kg/m²         Pre-Surgery Weight: 201 lbs  TBW lost: 56 lbs  % TBW lost 28 %  % EBW lost: 90 %     Patient has lost a total of 89 lbs from start of program.     Labs reviewed: mildly elevated PTH     Intolerances/Difficulties: none     Vitamins:  Bariatric MVI with iron  and Calcium Citrate 1000-1500mg/day     Protein goal: met, 70-80 grams/day     Fluid goal: met, 64 oz/day     Exercise: cardio and strength training 4 days/week     Nutrition Diagnosis:  PES: No nutrition diagnosis at this time       Nutrition Intervention:  Education provided to include review of post surgical diet progression, meal timing, protein and fluid intake, vitamins, and exercise.      Nutrition Monitoring and evaluation:  Protein intake: 75-90 grams/day (1.5-1.8gr/kg IBW)  Fluid intake: 64-90oz sugar free, calorie free, caffeine free, non carbonated beverages  Vitamins: Bariatric MVI with iron,  Calcium citrate 1000-1500mg/day in divided doses  Exercise: 45-60 minutes of exercise 5 days/week, including 3 days of strength training     Dietitian Plan/Recommendation:   Continue current intake and exercise         Physical Exam:    /74   Pulse 74   Ht 1.575 m (5' 2\")   Wt 65.8 kg (145 lb)   SpO2 98%   BMI 26.52 kg/m²     General: No acute distress  HEENT: P PERRLA, no scleral icterus.  Trachea midline.  Thoracic: Clear to auscultation bilaterally.  Cardiac: Regular rate and rhythm with no rubs clicks or murmurs.  Abdomen: The incisions are healing well.There are no hernias    Extremities: No clubbing cyanosis or edema.  Neurologic: No gross motor or sensory defects.        Assessment and Plan:      Cele Palmer   is 18 months  post-op from

## 2025-07-24 ENCOUNTER — OFFICE VISIT (OUTPATIENT)
Age: 70
End: 2025-07-24

## 2025-07-24 VITALS
BODY MASS INDEX: 26.13 KG/M2 | DIASTOLIC BLOOD PRESSURE: 62 MMHG | TEMPERATURE: 97.1 F | HEART RATE: 55 BPM | WEIGHT: 142 LBS | SYSTOLIC BLOOD PRESSURE: 116 MMHG | HEIGHT: 62 IN | OXYGEN SATURATION: 97 %

## 2025-07-24 DIAGNOSIS — D49.2 SKIN NEOPLASM: ICD-10-CM

## 2025-07-24 DIAGNOSIS — M79.9 SOFT TISSUE LESION OF FOOT: Primary | ICD-10-CM

## 2025-07-24 RX ORDER — LIDOCAINE HYDROCHLORIDE 10 MG/ML
1 INJECTION, SOLUTION INFILTRATION; PERINEURAL ONCE
Status: COMPLETED | OUTPATIENT
Start: 2025-07-24 | End: 2025-07-24

## 2025-07-24 RX ORDER — LIDOCAINE HYDROCHLORIDE AND EPINEPHRINE BITARTRATE 20; .01 MG/ML; MG/ML
1 INJECTION, SOLUTION SUBCUTANEOUS ONCE
Status: COMPLETED | OUTPATIENT
Start: 2025-07-24 | End: 2025-07-24

## 2025-07-24 RX ORDER — LIDOCAINE HYDROCHLORIDE 20 MG/ML
1 INJECTION, SOLUTION INFILTRATION; PERINEURAL ONCE
Status: DISCONTINUED | OUTPATIENT
Start: 2025-07-24 | End: 2025-07-24

## 2025-07-24 RX ADMIN — LIDOCAINE HYDROCHLORIDE AND EPINEPHRINE BITARTRATE 1 ML: 20; .01 INJECTION, SOLUTION SUBCUTANEOUS at 11:00

## 2025-07-24 RX ADMIN — LIDOCAINE HYDROCHLORIDE 1 ML: 10 INJECTION, SOLUTION INFILTRATION; PERINEURAL at 11:19

## 2025-07-24 ASSESSMENT — ENCOUNTER SYMPTOMS
BACK PAIN: 0
COLOR CHANGE: 0
ABDOMINAL DISTENTION: 0
WHEEZING: 0
SHORTNESS OF BREATH: 0
DIARRHEA: 0
ABDOMINAL PAIN: 0
NAUSEA: 0
COUGH: 0
VOMITING: 0
RECTAL PAIN: 0
CONSTIPATION: 0
CHEST TIGHTNESS: 0

## 2025-07-24 NOTE — PROGRESS NOTES
result expectations have been discussed with the patient who expresses understanding and desires to proceed.    Close follow up to evaluate treatment results and for coordination of care.  I have reviewed the patient's medical history in detail and updated the computerized patient record.    The patient (or guardian, if applicable) and other individuals in attendance with the patient were advised that Artificial Intelligence will be utilized during this visit to record, process the conversation to generate a clinical note, and support improvement of the AI technology. The patient (or guardian, if applicable) and other individuals in attendance at the appointment consented to the use of AI, including the recording.

## 2025-07-28 RX ORDER — ESCITALOPRAM OXALATE 20 MG/1
20 TABLET ORAL DAILY
Qty: 90 TABLET | Refills: 3 | Status: SHIPPED | OUTPATIENT
Start: 2025-07-28

## 2025-07-28 NOTE — TELEPHONE ENCOUNTER
Comments:     Last Office Visit (last PCP visit):   7/24/2025    Next Visit Date:  Future Appointments   Date Time Provider Department Center   11/19/2025 10:30 AM Jesusita Fragoso RD [facility]   11/19/2025 11:00 AM Anthony Hammer MD [facility]       **If hasn't been seen in over a year OR hasn't followed up according to last diabetes/ADHD visit, make appointment for patient before sending refill to provider.    Rx requested:  Requested Prescriptions     Pending Prescriptions Disp Refills    escitalopram (LEXAPRO) 20 MG tablet [Pharmacy Med Name: ESCITALOPRAM TABS 20MG] 90 tablet 3     Sig: TAKE 1 TABLET DAILY

## 2025-07-31 RX ORDER — METHOCARBAMOL 500 MG/1
TABLET, FILM COATED ORAL
Qty: 20 TABLET | Refills: 35 | Status: SHIPPED | OUTPATIENT
Start: 2025-07-31

## 2025-07-31 NOTE — TELEPHONE ENCOUNTER
Comments:     Last Office Visit (last PCP visit):   5/1/2025    Next Visit Date:  Future Appointments   Date Time Provider Department Center   11/19/2025 10:30 AM Jesusita Fragoso RD [facility]   11/19/2025 11:00 AM Anthony Hammer MD [facility]       **If hasn't been seen in over a year OR hasn't followed up according to last diabetes/ADHD visit, make appointment for patient before sending refill to provider.    Rx requested:  Requested Prescriptions     Pending Prescriptions Disp Refills    methocarbamol (ROBAXIN) 500 MG tablet [Pharmacy Med Name: METHOCARBAMOL TABS 500MG] 20 tablet 35     Sig: TAKE 1 TABLET IN THE MORNING AND AT BEDTIME

## 2025-08-04 DIAGNOSIS — R10.11 RUQ PAIN: ICD-10-CM

## 2025-08-06 RX ORDER — PANTOPRAZOLE SODIUM 40 MG/1
40 TABLET, DELAYED RELEASE ORAL
Qty: 90 TABLET | Refills: 3 | Status: SHIPPED | OUTPATIENT
Start: 2025-08-06

## 2025-09-02 ENCOUNTER — TRANSCRIBE ORDERS (OUTPATIENT)
Dept: ADMINISTRATIVE | Age: 70
End: 2025-09-02

## 2025-09-02 DIAGNOSIS — Z12.31 ENCOUNTER FOR SCREENING MAMMOGRAM FOR MALIGNANT NEOPLASM OF BREAST: Primary | ICD-10-CM

## (undated) DEVICE — RESERVOIR,SUCTION,100CC,SILICONE: Brand: MEDLINE

## (undated) DEVICE — SUTURE VCRL SZ 3-0 L27IN ABSRB UD L26MM SH 1/2 CIR J416H

## (undated) DEVICE — CONMED SCOPE SAVER BITE BLOCK, 20X27 MM: Brand: SCOPE SAVER

## (undated) DEVICE — STIMULATOR NERVE 4.32 MM PERC EXTN KT INTERSTIM QUAD

## (undated) DEVICE — SINGLE PORT MANIFOLD: Brand: NEPTUNE 2

## (undated) DEVICE — GLOVE ORANGE PI 8 1/2   MSG9085

## (undated) DEVICE — BRUSH ENDO CLN L90.5IN SHTH DIA1.7MM BRIST DIA5-7MM 2-6MM

## (undated) DEVICE — APPLIER CLP L SHFT DIA12MM 20 ROT MULT LIGACLP

## (undated) DEVICE — LABEL MED MINI W/ MARKER

## (undated) DEVICE — PACK,SET UP,DRAPE: Brand: MEDLINE

## (undated) DEVICE — ELECTRODE PT RET AD L9FT HI MOIST COND ADH HYDRGEL CORDED

## (undated) DEVICE — SUTURE ABSORBABLE MONOFILAMENT 2-0 SH 6 IN STRATAFIX SPRL SXPP1B415

## (undated) DEVICE — KIT CHOLGM POLYUR W/ KARLAN BLLN CATH 4FR L60CM 5MM INTRO

## (undated) DEVICE — FORCEPS BX L240CM JAW DIA2.8MM L CAP W/ NDL MIC MESH TOOTH

## (undated) DEVICE — BANDAGE ADH W2XL4IN NITRL FAB STRP CURAD

## (undated) DEVICE — DRAIN SURG 19FR 0.25IN SIL RND W/ TRCR INDIC DOT RADPQ FULL

## (undated) DEVICE — STAPLER 60: Brand: SUREFORM

## (undated) DEVICE — GLOVE ORANGE PI 8   MSG9080

## (undated) DEVICE — TROCAR: Brand: KII SHIELDED BLADED ACCESS SYSTEM

## (undated) DEVICE — TROCAR: Brand: KII FIOS FIRST ENTRY

## (undated) DEVICE — DRESSING HYDROFIBER AQUACEL AG ADVANTAGE 3.5X6 IN

## (undated) DEVICE — KIT,ANTI FOG,W/SPONGE & FLUID,SOFT PACK: Brand: MEDLINE

## (undated) DEVICE — COUNTER NDL 40 COUNT HLD 70 FOAM BLK ADH W/ MAG

## (undated) DEVICE — DRAPE EQUIP TRNSPRT CONTAINMENT FOR BK TAB

## (undated) DEVICE — APPLICATOR MEDICATED 26 CC SOLUTION HI LT ORNG CHLORAPREP

## (undated) DEVICE — TIP COVER ACCESSORY

## (undated) DEVICE — Device: Brand: ENDO SMARTCAP

## (undated) DEVICE — SUTURE PROL SZ 3-0 L30IN NONABSORBABLE BLU L30MM FS-1 3/8 8675H

## (undated) DEVICE — C-ARM: Brand: UNBRANDED

## (undated) DEVICE — REDUCER: Brand: ENDOWRIST

## (undated) DEVICE — GAUZE,SPONGE,4"X4",16PLY,XRAY,STRL,LF: Brand: MEDLINE

## (undated) DEVICE — SUTURE VICRYL SZ 2-0 L36IN ABSRB UD L36MM CT-1 1/2 CIR J945H

## (undated) DEVICE — BINDER ABD UNISX 12IN 85IN 3XL UNIV

## (undated) DEVICE — Device

## (undated) DEVICE — STAPLER 60 RELOAD BLUE: Brand: SUREFORM

## (undated) DEVICE — KIT ARMOR C DRP COLLAPSIBLE AND SELF EXP TOP CVR FOR FLUOROSCOPIC

## (undated) DEVICE — SYRINGE MED 10ML TRNSLUC BRL PLUNG BLK MRK POLYPR CTRL

## (undated) DEVICE — DRAPE,UTILITY,TAPE,15X26,STERILE: Brand: MEDLINE

## (undated) DEVICE — SYRINGE MED 10ML LUERLOCK TIP W/O SFTY DISP

## (undated) DEVICE — GOWN,AURORA,NONREINFORCED,LARGE: Brand: MEDLINE

## (undated) DEVICE — TTL1LYR 16FR10ML 100%SIL TMPST TR: Brand: MEDLINE

## (undated) DEVICE — VESSEL SEALER EXTEND: Brand: ENDOWRIST

## (undated) DEVICE — ENDOSCOPIC TRAY TRNSPRT 20.5X16.5X4.1 IN RECYCL SUGAR PULP

## (undated) DEVICE — ADAPTER FLSH PMP FLD MGMT GI IRRIG OFP 2 DISPOSABLE

## (undated) DEVICE — DRAPE,LAP,CHOLE,W/TROUGHS,STERILE: Brand: MEDLINE

## (undated) DEVICE — TROCAR: Brand: KII® SLEEVE

## (undated) DEVICE — DRAIN SURG 15FR 100% SIL RND END PERF

## (undated) DEVICE — TOWEL,OR,DSP,ST,BLUE,STD,4/PK,20PK/CS: Brand: MEDLINE

## (undated) DEVICE — ELECTRO LUBE IS A SINGLE PATIENT USE DEVICE THAT IS INTENDED TO BE USED ON ELECTROSURGICAL ELECTRODES TO REDUCE STICKING.: Brand: KEY SURGICAL ELECTRO LUBE

## (undated) DEVICE — WARMER SCP 2 ANTIFOG LAP DISP

## (undated) DEVICE — TUBING INSUFFLATION SMK EVAC HI FLO SET PNEUMOCLEAR

## (undated) DEVICE — STAPLER SKIN H3.9MM WIRE DIA0.58MM CRWN 6.9MM 35 STPL FIX

## (undated) DEVICE — NEEDLE INSUF L120MM ULT VERES ENDOPATH

## (undated) DEVICE — SPONGE,LAP,18"X18",DLX,XR,ST,5/PK,40/PK: Brand: MEDLINE

## (undated) DEVICE — VISIGI 3D®  CALIBRATION SYSTEM  SIZE 40FR STD W/ BULB: Brand: BOEHRINGER® VISIGI 3D™ SLEEVE GASTRECTOMY CALIBRATION SYSTEM, SIZE 40FR W/BULB

## (undated) DEVICE — ENDO CARRY-ON PROCEDURE KIT INCLUDES LUBRICANT, DEFENDO OLYMPUS AIR, WATER, SUCTION, BIOPSY VALVE KIT, ENZYMATIC SPONGE, AND BASIN.: Brand: ENDO CARRY-ON PROCEDURE KIT

## (undated) DEVICE — SYRINGE MED 30ML STD CLR PLAS LUERLOCK TIP N CTRL DISP

## (undated) DEVICE — COVER LT HNDL BLU PLAS

## (undated) DEVICE — SEAL

## (undated) DEVICE — 1010 S-DRAPE TOWEL DRAPE 10/BX: Brand: STERI-DRAPE™

## (undated) DEVICE — ARM DRAPE

## (undated) DEVICE — COLUMN DRAPE

## (undated) DEVICE — LIQUIBAND RAPID ADHESIVE 36/CS 0.8ML: Brand: MEDLINE

## (undated) DEVICE — BLADE,CARBON-STEEL,11,STRL,DISPOSABLE,TB: Brand: MEDLINE

## (undated) DEVICE — COVER,MAYO STAND,STERILE: Brand: MEDLINE

## (undated) DEVICE — INTENDED FOR TISSUE SEPARATION, AND OTHER PROCEDURES THAT REQUIRE A SHARP SURGICAL BLADE TO PUNCTURE OR CUT.: Brand: BARD-PARKER ® CARBON RIB-BACK BLADES

## (undated) DEVICE — TUBING IRRIGATION 140/160/180/190 SER GI ENDOSCP SMARTCAP

## (undated) DEVICE — SOLUTION ANTIFOG VIS SYS CLEARIFY LAPSCP

## (undated) DEVICE — GOWN,PREVENTION PLUS,XLN/2XL,ST,22/CS: Brand: MEDLINE

## (undated) DEVICE — MARKER SURG SKIN GENTIAN VLT REG TIP W/ 6IN RUL DYNJSM01

## (undated) DEVICE — SUTURE SZ 0 27IN 5/8 CIR UR-6  TAPER PT VIOLET ABSRB VICRYL J603H

## (undated) DEVICE — PACK,BASIC: Brand: MEDLINE

## (undated) DEVICE — PROGRAMMER SMART COMM W/HANDSET F/SACRAL NRVE STIMULATORS

## (undated) DEVICE — Z DUPLICATE USE 2431315 SET INSUF TBNG HI FLO W/ SMK EVAC FOR PNEUMOCLEAR

## (undated) DEVICE — TUBE SET 96 MM 64 MM H2O PERISTALTIC STD AUX CHANNEL

## (undated) DEVICE — ENDO CARRY-ON PROCEDURE KIT: Brand: ENDO CARRY-ON PROCEDURE KIT

## (undated) DEVICE — SUTURE PERMA-HAND SZ 2-0 L30IN NONABSORBABLE BLK L26MM SH K833H

## (undated) DEVICE — CANNULA SEAL

## (undated) DEVICE — SUTURE MCRYL SZ 4-0 L27IN ABSRB UD L19MM PS-2 1/2 CIR PRIM Y426H

## (undated) DEVICE — SUTURE NONABSORBABLE MONOFILAMENT 3-0 PS-1 18 IN BLK ETHILON 1663H

## (undated) DEVICE — STAPLER 60 RELOAD WHITE: Brand: SUREFORM

## (undated) DEVICE — BLADELESS OBTURATOR: Brand: WECK VISTA

## (undated) DEVICE — ADHESIVE SKIN CLSR 0.7ML TOP DERMBND ADV

## (undated) DEVICE — SUTURE MONOCRYL SZ 4-0 L27IN ABSRB UD L19MM PS-2 1/2 CIR PRIM Y426H

## (undated) DEVICE — NEUROSTIMULATOR EXT SM LTWT SGL BTTN H2O RESIST WIRELESS

## (undated) DEVICE — GENERAL MINOR: Brand: MEDLINE INDUSTRIES, INC.

## (undated) DEVICE — SUCTION IRRIGATOR: Brand: ENDOWRIST

## (undated) DEVICE — SWABSTICK MEDICATED 10% POVIDONE IOD PVP SGL ANTISEP SAT